# Patient Record
Sex: FEMALE | Race: WHITE | NOT HISPANIC OR LATINO | Employment: FULL TIME | URBAN - METROPOLITAN AREA
[De-identification: names, ages, dates, MRNs, and addresses within clinical notes are randomized per-mention and may not be internally consistent; named-entity substitution may affect disease eponyms.]

---

## 2017-01-10 ENCOUNTER — TELEPHONE (OUTPATIENT)
Dept: FAMILY MEDICINE | Facility: CLINIC | Age: 52
End: 2017-01-10

## 2017-01-10 DIAGNOSIS — I10 ESSENTIAL HYPERTENSION: Primary | ICD-10-CM

## 2017-01-10 DIAGNOSIS — E11.9 TYPE 2 DIABETES MELLITUS WITHOUT COMPLICATION, WITHOUT LONG-TERM CURRENT USE OF INSULIN: ICD-10-CM

## 2017-01-10 DIAGNOSIS — E78.5 HYPERLIPIDEMIA LDL GOAL <100: ICD-10-CM

## 2017-01-10 NOTE — TELEPHONE ENCOUNTER
----- Message from Chiquita Grijalva sent at 1/10/2017 11:39 AM CST -----  Contact: Self   Patient need a refill on all her medication and can she get a order for blood work for her future appointment. Please call thank you

## 2017-01-13 DIAGNOSIS — E11.9 TYPE 2 DIABETES MELLITUS WITHOUT COMPLICATION: ICD-10-CM

## 2017-01-30 ENCOUNTER — PATIENT MESSAGE (OUTPATIENT)
Dept: FAMILY MEDICINE | Facility: CLINIC | Age: 52
End: 2017-01-30

## 2017-01-30 DIAGNOSIS — E78.5 HYPERLIPIDEMIA LDL GOAL <100: ICD-10-CM

## 2017-01-30 DIAGNOSIS — I10 ESSENTIAL HYPERTENSION: ICD-10-CM

## 2017-01-30 DIAGNOSIS — K21.9 GASTROESOPHAGEAL REFLUX DISEASE, ESOPHAGITIS PRESENCE NOT SPECIFIED: ICD-10-CM

## 2017-01-30 RX ORDER — PANTOPRAZOLE SODIUM 40 MG/1
TABLET, DELAYED RELEASE ORAL
Qty: 30 TABLET | Refills: 0 | Status: SHIPPED | OUTPATIENT
Start: 2017-01-30 | End: 2017-02-17 | Stop reason: SDUPTHER

## 2017-01-30 RX ORDER — IRBESARTAN 150 MG/1
TABLET ORAL
Qty: 30 TABLET | Refills: 0 | Status: SHIPPED | OUTPATIENT
Start: 2017-01-30 | End: 2017-02-17 | Stop reason: SDUPTHER

## 2017-01-30 RX ORDER — PRAVASTATIN SODIUM 10 MG/1
TABLET ORAL
Qty: 30 TABLET | Refills: 0 | Status: SHIPPED | OUTPATIENT
Start: 2017-01-30 | End: 2017-03-03 | Stop reason: SDUPTHER

## 2017-01-30 RX ORDER — METOPROLOL TARTRATE 50 MG/1
TABLET ORAL
Qty: 90 TABLET | Refills: 0 | Status: SHIPPED | OUTPATIENT
Start: 2017-01-30 | End: 2017-03-03 | Stop reason: SDUPTHER

## 2017-02-07 ENCOUNTER — PATIENT MESSAGE (OUTPATIENT)
Dept: FAMILY MEDICINE | Facility: CLINIC | Age: 52
End: 2017-02-07

## 2017-02-07 DIAGNOSIS — I10 ESSENTIAL HYPERTENSION: ICD-10-CM

## 2017-02-08 RX ORDER — HYDROCHLOROTHIAZIDE 12.5 MG/1
12.5 CAPSULE ORAL DAILY
Qty: 30 CAPSULE | Refills: 0 | Status: SHIPPED | OUTPATIENT
Start: 2017-02-08 | End: 2017-03-03 | Stop reason: SDUPTHER

## 2017-02-17 DIAGNOSIS — I10 ESSENTIAL HYPERTENSION: ICD-10-CM

## 2017-02-17 DIAGNOSIS — K21.9 GASTROESOPHAGEAL REFLUX DISEASE, ESOPHAGITIS PRESENCE NOT SPECIFIED: ICD-10-CM

## 2017-02-17 RX ORDER — PANTOPRAZOLE SODIUM 40 MG/1
40 TABLET, DELAYED RELEASE ORAL DAILY
Qty: 30 TABLET | Refills: 0 | Status: SHIPPED | OUTPATIENT
Start: 2017-02-17 | End: 2017-03-10 | Stop reason: SDUPTHER

## 2017-02-17 RX ORDER — IRBESARTAN 150 MG/1
150 TABLET ORAL DAILY
Qty: 30 TABLET | Refills: 0 | Status: SHIPPED | OUTPATIENT
Start: 2017-02-17 | End: 2017-03-10 | Stop reason: SDUPTHER

## 2017-03-03 DIAGNOSIS — I10 ESSENTIAL HYPERTENSION: ICD-10-CM

## 2017-03-03 DIAGNOSIS — E78.5 HYPERLIPIDEMIA LDL GOAL <100: ICD-10-CM

## 2017-03-03 RX ORDER — PRAVASTATIN SODIUM 10 MG/1
TABLET ORAL
Qty: 30 TABLET | Refills: 0 | Status: SHIPPED | OUTPATIENT
Start: 2017-03-03 | End: 2017-03-10 | Stop reason: SDUPTHER

## 2017-03-03 RX ORDER — METOPROLOL TARTRATE 50 MG/1
TABLET ORAL
Qty: 90 TABLET | Refills: 0 | Status: SHIPPED | OUTPATIENT
Start: 2017-03-03 | End: 2017-03-10 | Stop reason: SDUPTHER

## 2017-03-03 RX ORDER — HYDROCHLOROTHIAZIDE 12.5 MG/1
CAPSULE ORAL
Qty: 30 CAPSULE | Refills: 0 | Status: SHIPPED | OUTPATIENT
Start: 2017-03-03 | End: 2017-03-10 | Stop reason: SDUPTHER

## 2017-03-06 ENCOUNTER — LAB VISIT (OUTPATIENT)
Dept: LAB | Facility: HOSPITAL | Age: 52
End: 2017-03-06
Attending: INTERNAL MEDICINE
Payer: COMMERCIAL

## 2017-03-06 DIAGNOSIS — E11.9 TYPE 2 DIABETES MELLITUS WITHOUT COMPLICATION, WITHOUT LONG-TERM CURRENT USE OF INSULIN: ICD-10-CM

## 2017-03-06 DIAGNOSIS — E78.5 HYPERLIPIDEMIA LDL GOAL <100: ICD-10-CM

## 2017-03-06 DIAGNOSIS — I10 ESSENTIAL HYPERTENSION: ICD-10-CM

## 2017-03-06 LAB
ALBUMIN SERPL BCP-MCNC: 3.1 G/DL
ALP SERPL-CCNC: 62 U/L
ALT SERPL W/O P-5'-P-CCNC: 10 U/L
ANION GAP SERPL CALC-SCNC: 12 MMOL/L
AST SERPL-CCNC: 13 U/L
BASOPHILS # BLD AUTO: 0.02 K/UL
BASOPHILS NFR BLD: 0.2 %
BILIRUB SERPL-MCNC: 0.3 MG/DL
BUN SERPL-MCNC: 14 MG/DL
CALCIUM SERPL-MCNC: 9.1 MG/DL
CHLORIDE SERPL-SCNC: 107 MMOL/L
CHOLEST/HDLC SERPL: 3.8 {RATIO}
CO2 SERPL-SCNC: 22 MMOL/L
CREAT SERPL-MCNC: 0.9 MG/DL
DIFFERENTIAL METHOD: ABNORMAL
EOSINOPHIL # BLD AUTO: 0.1 K/UL
EOSINOPHIL NFR BLD: 1.2 %
ERYTHROCYTE [DISTWIDTH] IN BLOOD BY AUTOMATED COUNT: 13.6 %
EST. GFR  (AFRICAN AMERICAN): >60 ML/MIN/1.73 M^2
EST. GFR  (NON AFRICAN AMERICAN): >60 ML/MIN/1.73 M^2
ESTIMATED AVG GLUCOSE: 154 MG/DL
GLUCOSE SERPL-MCNC: 122 MG/DL
HBA1C MFR BLD HPLC: 7 %
HCT VFR BLD AUTO: 38.9 %
HDL/CHOLESTEROL RATIO: 26.2 %
HDLC SERPL-MCNC: 141 MG/DL
HDLC SERPL-MCNC: 37 MG/DL
HGB BLD-MCNC: 12.4 G/DL
LDLC SERPL CALC-MCNC: 68.8 MG/DL
LYMPHOCYTES # BLD AUTO: 3.8 K/UL
LYMPHOCYTES NFR BLD: 40.5 %
MCH RBC QN AUTO: 29.7 PG
MCHC RBC AUTO-ENTMCNC: 31.9 %
MCV RBC AUTO: 93 FL
MONOCYTES # BLD AUTO: 0.6 K/UL
MONOCYTES NFR BLD: 6.4 %
NEUTROPHILS # BLD AUTO: 4.9 K/UL
NEUTROPHILS NFR BLD: 51.5 %
NONHDLC SERPL-MCNC: 104 MG/DL
PLATELET # BLD AUTO: 280 K/UL
PMV BLD AUTO: 10.4 FL
POTASSIUM SERPL-SCNC: 4.4 MMOL/L
PROT SERPL-MCNC: 6.8 G/DL
RBC # BLD AUTO: 4.18 M/UL
SODIUM SERPL-SCNC: 141 MMOL/L
TRIGL SERPL-MCNC: 176 MG/DL
WBC # BLD AUTO: 9.48 K/UL

## 2017-03-06 PROCEDURE — 83036 HEMOGLOBIN GLYCOSYLATED A1C: CPT

## 2017-03-06 PROCEDURE — 80053 COMPREHEN METABOLIC PANEL: CPT

## 2017-03-06 PROCEDURE — 36415 COLL VENOUS BLD VENIPUNCTURE: CPT | Mod: PO

## 2017-03-06 PROCEDURE — 85025 COMPLETE CBC W/AUTO DIFF WBC: CPT

## 2017-03-06 PROCEDURE — 80061 LIPID PANEL: CPT

## 2017-03-10 ENCOUNTER — PATIENT MESSAGE (OUTPATIENT)
Dept: ADMINISTRATIVE | Facility: OTHER | Age: 52
End: 2017-03-10

## 2017-03-10 ENCOUNTER — OFFICE VISIT (OUTPATIENT)
Dept: FAMILY MEDICINE | Facility: CLINIC | Age: 52
End: 2017-03-10
Payer: COMMERCIAL

## 2017-03-10 VITALS
HEIGHT: 61 IN | WEIGHT: 173.06 LBS | SYSTOLIC BLOOD PRESSURE: 146 MMHG | OXYGEN SATURATION: 96 % | TEMPERATURE: 99 F | BODY MASS INDEX: 32.67 KG/M2 | DIASTOLIC BLOOD PRESSURE: 96 MMHG | HEART RATE: 82 BPM

## 2017-03-10 DIAGNOSIS — I10 ESSENTIAL HYPERTENSION: ICD-10-CM

## 2017-03-10 DIAGNOSIS — Z00.00 ROUTINE MEDICAL EXAM: Primary | ICD-10-CM

## 2017-03-10 DIAGNOSIS — E78.5 HYPERLIPIDEMIA WITH TARGET LDL LESS THAN 100: ICD-10-CM

## 2017-03-10 DIAGNOSIS — F41.9 ANXIETY: ICD-10-CM

## 2017-03-10 DIAGNOSIS — E66.9 OBESITY (BMI 30-39.9): ICD-10-CM

## 2017-03-10 DIAGNOSIS — E11.9 DIABETES MELLITUS WITHOUT COMPLICATION: ICD-10-CM

## 2017-03-10 DIAGNOSIS — K21.9 GASTROESOPHAGEAL REFLUX DISEASE, ESOPHAGITIS PRESENCE NOT SPECIFIED: ICD-10-CM

## 2017-03-10 LAB
CREAT UR-MCNC: 84 MG/DL
MICROALBUMIN UR DL<=1MG/L-MCNC: 172 UG/ML
MICROALBUMIN/CREATININE RATIO: 204.8 UG/MG

## 2017-03-10 PROCEDURE — 82570 ASSAY OF URINE CREATININE: CPT

## 2017-03-10 PROCEDURE — 3077F SYST BP >= 140 MM HG: CPT | Mod: S$GLB,,, | Performed by: INTERNAL MEDICINE

## 2017-03-10 PROCEDURE — 99999 PR PBB SHADOW E&M-EST. PATIENT-LVL IV: CPT | Mod: PBBFAC,,, | Performed by: INTERNAL MEDICINE

## 2017-03-10 PROCEDURE — 99396 PREV VISIT EST AGE 40-64: CPT | Mod: S$GLB,,, | Performed by: INTERNAL MEDICINE

## 2017-03-10 PROCEDURE — 3080F DIAST BP >= 90 MM HG: CPT | Mod: S$GLB,,, | Performed by: INTERNAL MEDICINE

## 2017-03-10 RX ORDER — ALPRAZOLAM 0.25 MG/1
0.25 TABLET ORAL DAILY PRN
Qty: 30 TABLET | Refills: 2 | Status: SHIPPED | OUTPATIENT
Start: 2017-03-10 | End: 2017-12-13 | Stop reason: SDUPTHER

## 2017-03-10 RX ORDER — METFORMIN HYDROCHLORIDE 500 MG/1
500 TABLET, EXTENDED RELEASE ORAL
Qty: 90 TABLET | Refills: 1 | Status: SHIPPED | OUTPATIENT
Start: 2017-03-10 | End: 2017-09-13 | Stop reason: SDUPTHER

## 2017-03-10 RX ORDER — PRAVASTATIN SODIUM 10 MG/1
10 TABLET ORAL DAILY
Qty: 30 TABLET | Refills: 5 | Status: SHIPPED | OUTPATIENT
Start: 2017-03-10 | End: 2017-09-13 | Stop reason: SDUPTHER

## 2017-03-10 RX ORDER — PANTOPRAZOLE SODIUM 40 MG/1
40 TABLET, DELAYED RELEASE ORAL DAILY
Qty: 30 TABLET | Refills: 5 | Status: SHIPPED | OUTPATIENT
Start: 2017-03-10 | End: 2017-12-13 | Stop reason: SDUPTHER

## 2017-03-10 RX ORDER — HYDROCHLOROTHIAZIDE 12.5 MG/1
12.5 CAPSULE ORAL DAILY
Qty: 30 CAPSULE | Refills: 5 | Status: SHIPPED | OUTPATIENT
Start: 2017-03-10 | End: 2017-09-29 | Stop reason: SDUPTHER

## 2017-03-10 RX ORDER — METOPROLOL TARTRATE 50 MG/1
TABLET ORAL
Qty: 90 TABLET | Refills: 5 | Status: SHIPPED | OUTPATIENT
Start: 2017-03-10 | End: 2017-09-29 | Stop reason: SDUPTHER

## 2017-03-10 RX ORDER — IRBESARTAN 150 MG/1
150 TABLET ORAL DAILY
Qty: 30 TABLET | Refills: 5 | Status: SHIPPED | OUTPATIENT
Start: 2017-03-10 | End: 2017-09-18 | Stop reason: SDUPTHER

## 2017-03-10 NOTE — PROGRESS NOTES
HISTORY OF PRESENT ILLNESS:  Vijaya Montenegro is a 51 y.o. female who presents to the clinic today for a routine medical physical exam. Her last physical exam was approximately 1 years(s) ago.    Contraception: n/a      PAST MEDICAL HISTORY:  Past Medical History:   Diagnosis Date    Anxiety     GERD (gastroesophageal reflux disease)     Gout, arthritis     Hyperlipidemia LDL goal < 100     Hypertension     Obesity     Type II or unspecified type diabetes mellitus without mention of complication, not stated as uncontrolled     diet controlled       PAST SURGICAL HISTORY:  Past Surgical History:   Procedure Laterality Date     SECTION, LOW TRANSVERSE      x2       SOCIAL HISTORY:  Social History     Social History    Marital status:      Spouse name: N/A    Number of children: 2    Years of education: N/A     Occupational History    Not on file.     Social History Main Topics    Smoking status: Never Smoker    Smokeless tobacco: Never Used    Alcohol use Yes    Drug use: No    Sexual activity: Yes     Partners: Male     Other Topics Concern    Not on file     Social History Narrative    No narrative on file       FAMILY HISTORY:  Family History   Problem Relation Age of Onset    Hypertension Father     Bladder Cancer Father     Diabetes Sister     Heart attack Maternal Grandmother     COPD Mother     Diabetes Paternal Grandmother     Diabetes Paternal Aunt     Diabetes Paternal Aunt     Colon cancer Neg Hx        ALLERGIES AND MEDICATIONS: updated and reviewed.  Review of patient's allergies indicates:   Allergen Reactions    Codeine Other (See Comments)     Medication List with Changes/Refills   New Medications    METFORMIN (GLUCOPHAGE-XR) 500 MG 24 HR TABLET    Take 1 tablet (500 mg total) by mouth daily with breakfast.   Current Medications    PREVIFEM 0.25-35 MG-MCG PER TABLET    Take 1 tablet by mouth once daily.   Changed and/or Refilled Medications    Modified  Medication Previous Medication    ALPRAZOLAM (XANAX) 0.25 MG TABLET alprazolam (XANAX) 0.25 MG tablet       Take 1 tablet (0.25 mg total) by mouth daily as needed for Anxiety.    Take 1 tablet (0.25 mg total) by mouth daily as needed for Anxiety.    BLOOD SUGAR DIAGNOSTIC STRP blood sugar diagnostic Strp       Test one time daily.    Test one time daily.    HYDROCHLOROTHIAZIDE (MICROZIDE) 12.5 MG CAPSULE hydrochlorothiazide (MICROZIDE) 12.5 mg capsule       Take 1 capsule (12.5 mg total) by mouth once daily.    TAKE ONE CAPSULE BY MOUTH ONCE A DAY    IRBESARTAN (AVAPRO) 150 MG TABLET irbesartan (AVAPRO) 150 MG tablet       Take 1 tablet (150 mg total) by mouth once daily.    Take 1 tablet (150 mg total) by mouth once daily.    LANCETS 31 GAUGE MISC lancets 31 gauge Misc       1 lancet by Misc.(Non-Drug; Combo Route) route 2 (two) times daily. Test BID    1 lancet by Misc.(Non-Drug; Combo Route) route 2 (two) times daily. Test BID    METOPROLOL TARTRATE (LOPRESSOR) 50 MG TABLET metoprolol tartrate (LOPRESSOR) 50 MG tablet       TAKE 1 TABLET(S) BY MOUTH THREE TIMES DAILY    TAKE 1 TABLET(S) BY MOUTH THREE TIMES DAILY    PANTOPRAZOLE (PROTONIX) 40 MG TABLET pantoprazole (PROTONIX) 40 MG tablet       Take 1 tablet (40 mg total) by mouth once daily.    Take 1 tablet (40 mg total) by mouth once daily.    PRAVASTATIN (PRAVACHOL) 10 MG TABLET pravastatin (PRAVACHOL) 10 MG tablet       Take 1 tablet (10 mg total) by mouth once daily.    TAKE 1 TABLET(S) BY MOUTH ONCE A DAY             SCREENING HISTORY:  Health Maintenance       Date Due Completion Date    Influenza Vaccine 8/1/2016 12/16/2015 (Done)    Override on 12/16/2015: Done    Override on 10/17/2014: Done (at pharmacy)    Override on 10/1/2013: Done    Foot Exam 1/4/2017 1/4/2016    Urine Microalbumin 1/4/2017 1/4/2016    Eye Exam 8/15/2017 8/15/2016 (Done)    Override on 8/15/2016: Done    Override on 6/10/2014: Done (normal per patient)    Hemoglobin A1c 9/6/2017  3/6/2017    Override on 11/10/2014: Done ([facility] - 6.6)    Pap Smear 1/19/2018 1/19/2015 (Done)    Override on 1/19/2015: Done ([employer])    Override on 10/1/2012: Done    Override on 11/16/2006: Done    Lipid Panel 3/6/2018 3/6/2017    Override on 11/10/2014: Done (Total cholesterol 205, HDL 52, triglycerides 252, and )    Mammogram 2/27/2019 2/27/2017 (Done)    Override on 2/27/2017: Done (done at [facility])    Override on 2/1/2014: Done    Override on 10/3/2011: Done    Override on 12/5/2006: Done    TETANUS VACCINE 7/5/2023 7/5/2013    Colonoscopy 3/10/2027 3/10/2017 (Not Clinical)    Override on 3/10/2017: Not Clinically Appropriate (pt will do next month)    Pneumococcal PPSV23 (Medium Risk) (2) 11/3/2031 8/14/2013            REVIEW OF SYSTEMS:   The patient reports fair dietary habits.  The patient does not exercise regularly.  General ROS: negative for - chills, fever or malaise  Psychological ROS: negative for - memory difficulties, obsessive thoughts or suicidal ideation  Ophthalmic ROS: negative for - blurry vision or eye pain  ENT ROS: negative for - epistaxis, headaches, nasal congestion, oral lesions or sore throat  Allergy and Immunology ROS: negative for - hives  Hematological and Lymphatic ROS: negative for - swollen lymph nodes  Endocrine ROS: negative for - polydipsia/polyuria or temperature intolerance  Respiratory ROS: no cough, shortness of breath, or wheezing  Cardiovascular ROS: no chest pain or dyspnea on exertion  Gastrointestinal ROS: no abdominal pain, change in bowel habits, or black or bloody stools  Genito-Urinary ROS: no dysuria, trouble voiding, or hematuria  Breast ROS: negative for breast lumps  Musculoskeletal ROS: negative for - gait disturbance, joint swelling or muscle pain  Neurological ROS: no TIA or stroke symptoms  Dermatological ROS: negative for mole changes and rash    Physical Examination:   Vitals:    03/10/17 1256   BP: (!) 146/96   Pulse: 82  "  Temp: 98.9 °F (37.2 °C)     Weight: 78.5 kg (173 lb 1 oz)   Height: 5' 1" (154.9 cm)   Body mass index is 32.7 kg/(m^2).    General appearance - alert, well appearing, and in no distress and obese  Mental status - alert, oriented to person, place, and time, normal mood, behavior, speech, dress, motor activity, and thought processes  Eyes - pupils equal and reactive, extraocular eye movements intact, sclera anicteric  Mouth - mucous membranes moist, pharynx normal without lesions  Neck - supple, no significant adenopathy, carotids upstroke normal bilaterally, no bruits, thyroid exam: thyroid is normal in size without nodules or tenderness  Lymphatics - no palpable lymphadenopathy  Chest - clear to auscultation, no wheezes, rales or rhonchi, symmetric air entry  Heart - normal rate and regular rhythm, no gallops noted  Abdomen - soft, nontender, nondistended, no masses or organomegaly  Breasts - not examined  Back exam - full range of motion, no tenderness, palpable spasm or pain on motion  Neurological - alert, oriented, normal speech, no focal findings or movement disorder noted, cranial nerves II through XII intact  Musculoskeletal - no joint tenderness, deformity or swelling, no muscular tenderness noted  Extremities - peripheral pulses normal, no pedal edema, no clubbing or cyanosis  Skin - normal coloration and turgor, no rashes, no suspicious skin lesions noted      Protective Sensation (w/ 10 gram monofilament):  Right: Intact  Left: Intact    Visual Inspection:  Normal -  Bilateral    Pedal Pulses:   Right: Present  Left: Present    Posterior tibialis:   Right:Present  Left: Present       Results for orders placed or performed in visit on 03/06/17   CBC auto differential   Result Value Ref Range    WBC 9.48 3.90 - 12.70 K/uL    RBC 4.18 4.00 - 5.40 M/uL    Hemoglobin 12.4 12.0 - 16.0 g/dL    Hematocrit 38.9 37.0 - 48.5 %    MCV 93 82 - 98 fL    MCH 29.7 27.0 - 31.0 pg    MCHC 31.9 (L) 32.0 - 36.0 %    RDW " 13.6 11.5 - 14.5 %    Platelets 280 150 - 350 K/uL    MPV 10.4 9.2 - 12.9 fL    Gran # 4.9 1.8 - 7.7 K/uL    Lymph # 3.8 1.0 - 4.8 K/uL    Mono # 0.6 0.3 - 1.0 K/uL    Eos # 0.1 0.0 - 0.5 K/uL    Baso # 0.02 0.00 - 0.20 K/uL    Gran% 51.5 38.0 - 73.0 %    Lymph% 40.5 18.0 - 48.0 %    Mono% 6.4 4.0 - 15.0 %    Eosinophil% 1.2 0.0 - 8.0 %    Basophil% 0.2 0.0 - 1.9 %    Differential Method Automated    Comprehensive metabolic panel   Result Value Ref Range    Sodium 141 136 - 145 mmol/L    Potassium 4.4 3.5 - 5.1 mmol/L    Chloride 107 95 - 110 mmol/L    CO2 22 (L) 23 - 29 mmol/L    Glucose 122 (H) 70 - 110 mg/dL    BUN, Bld 14 6 - 20 mg/dL    Creatinine 0.9 0.5 - 1.4 mg/dL    Calcium 9.1 8.7 - 10.5 mg/dL    Total Protein 6.8 6.0 - 8.4 g/dL    Albumin 3.1 (L) 3.5 - 5.2 g/dL    Total Bilirubin 0.3 0.1 - 1.0 mg/dL    Alkaline Phosphatase 62 55 - 135 U/L    AST 13 10 - 40 U/L    ALT 10 10 - 44 U/L    Anion Gap 12 8 - 16 mmol/L    eGFR if African American >60.0 >60 mL/min/1.73 m^2    eGFR if non African American >60.0 >60 mL/min/1.73 m^2   Lipid panel   Result Value Ref Range    Cholesterol 141 120 - 199 mg/dL    Triglycerides 176 (H) 30 - 150 mg/dL    HDL 37 (L) 40 - 75 mg/dL    LDL Cholesterol 68.8 63.0 - 159.0 mg/dL    HDL/Chol Ratio 26.2 20.0 - 50.0 %    Total Cholesterol/HDL Ratio 3.8 2.0 - 5.0    Non-HDL Cholesterol 104 mg/dL   Hemoglobin A1c   Result Value Ref Range    Hemoglobin A1C 7.0 (H) 4.5 - 6.2 %    Estimated Avg Glucose 154 (H) 68 - 131 mg/dL          ASSESSMENT AND PLAN:  1. Routine medical exam  Counseled on age appropriate medical preventative services including age appropriate cancer screenings, age appropriate eye and dental exams, over all nutritional health, need for a consistent exercise regimen, and an over all push towards maintaining a vigorous and active lifestyle.  Counseled on age appropriate vaccines and discussed upcoming health care needs based on age/gender. Discussed good sleep hygiene  and stress management.     2. Diabetes mellitus without complication  Diabetes control has worsened.  I will start her on metformin.  We discussed diabetic diet and regular exercise.  She was given some information.  We will also refer her to a nutritionist.  Recheck blood work in 6 months.  - Microalbumin/creatinine urine ratio  - lancets 31 gauge Misc; 1 lancet by Misc.(Non-Drug; Combo Route) route 2 (two) times daily. Test BID  Dispense: 100 each; Refill: 5  - blood sugar diagnostic Strp; Test one time daily.  Dispense: 100 strip; Refill: 5  - metformin (GLUCOPHAGE-XR) 500 MG 24 hr tablet; Take 1 tablet (500 mg total) by mouth daily with breakfast.  Dispense: 90 tablet; Refill: 1  - Ambulatory Referral to Diabetes Education    3. Essential hypertension  Discussed sodium restriction, maintaining ideal body weight and regular exercise program as physiologic means to achieve blood pressure control. The patient will strive towards this. The current medical regimen is effective;  continue present plan and medications. Recommended patient to check home readings to monitor and see me for followup as scheduled or sooner as needed. Patient was educated that both decongestant and anti-inflammatory medication may raise blood pressure.   - metoprolol tartrate (LOPRESSOR) 50 MG tablet; TAKE 1 TABLET(S) BY MOUTH THREE TIMES DAILY  Dispense: 90 tablet; Refill: 5  - hydrochlorothiazide (MICROZIDE) 12.5 mg capsule; Take 1 capsule (12.5 mg total) by mouth once daily.  Dispense: 30 capsule; Refill: 5  - irbesartan (AVAPRO) 150 MG tablet; Take 1 tablet (150 mg total) by mouth once daily.  Dispense: 30 tablet; Refill: 5  - Hypertension Digital Medicine (HDMP)  Enrollment Order  - Assign [facility] Onboarding Questionnaire Series    4. Hyperlipidemia with target LDL less than 100  We discussed low fat diet and regular exercise.The current medical regimen is effective;  continue present plan and medications.    - pravastatin (PRAVACHOL) 10  MG tablet; Take 1 tablet (10 mg total) by mouth once daily.  Dispense: 30 tablet; Refill: 5    5. Gastroesophageal reflux disease, esophagitis presence not specified  Symptoms controlled. Reflux precautions discussed (eliminate tobacco if a smoker; minimize caffeine, chocolate and red/white peppermint intake; avoid heavy and spicy meals; don't lay down within 2-3 hours after eating). Medication as needed. Patient asked to take medication breaks, if possible - discussed chronic use can limit calcium absorption, increases the risk for intestinal infections, and can cause kidney damage.    - pantoprazole (PROTONIX) 40 MG tablet; Take 1 tablet (40 mg total) by mouth once daily.  Dispense: 30 tablet; Refill: 5    6. Anxiety  Stable. Medication as needed. Patient was counseled that a recent study showed that the chronic use of anxiolytic medication may increase the risk for developing dementia.   - alprazolam (XANAX) 0.25 MG tablet; Take 1 tablet (0.25 mg total) by mouth daily as needed for Anxiety.  Dispense: 30 tablet; Refill: 2    7. Obesity (BMI 30-39.9)  The patient is asked to make an attempt to improve diet and exercise patterns to aid in medical management of this problem.           Return in about 6 months (around 9/10/2017), or if symptoms worsen or fail to improve, for follow up chronic medical conditions.. or sooner as needed.

## 2017-03-10 NOTE — MR AVS SNAPSHOT
[facility]  [address] [facility]  [place] [address]  Phone: [phone]  Fax: [phone]                  Vijaya Montenegro   3/10/2017 1:00 PM   Office Visit    Description:  Female : 1965   Provider:  Lanette Hoover MD   Department:  [facility]           Reason for Visit     Medication Refill           Diagnoses this Visit        Comments    Routine medical exam    -  Primary     Diabetes mellitus without complication         Essential hypertension         Hyperlipidemia with target LDL less than 100         Gastroesophageal reflux disease, esophagitis presence not specified         Anxiety         Obesity (BMI 30-39.9)                To Do List           Goals (5 Years of Data)              3/6/17    8/8/16    12/29/15    HEMOGLOBIN A1C < 7.0   7.0  6.4  6.5    Related Problems    Diabetes mellitus without complication      Follow-Up and Disposition     Return in about 6 months (around 9/10/2017), or if symptoms worsen or fail to improve, for follow up chronic medical conditions..       These Medications        Disp Refills Start End    alprazolam (XANAX) 0.25 MG tablet 30 tablet 2 3/10/2017     Take 1 tablet (0.25 mg total) by mouth daily as needed for Anxiety. - Oral    Pharmacy: [place] [id number] Ph #: [phone]       metoprolol tartrate (LOPRESSOR) 50 MG tablet 90 tablet 5 3/10/2017     TAKE 1 TABLET(S) BY MOUTH THREE TIMES DAILY    Pharmacy: [place] [id number] Ph #: [phone]       hydrochlorothiazide (MICROZIDE) 12.5 mg capsule 30 capsule 5 3/10/2017     Take 1 capsule (12.5 mg total) by mouth once daily. - Oral    Pharmacy: [place] [id number] Ph #: [phone]       irbesartan (AVAPRO) 150 MG tablet 30 tablet 5 3/10/2017     Take 1 tablet (150 mg total) by mouth once daily. - Oral    [place] [facility]  [place] [address] [place] #: [phone]       pantoprazole (PROTONIX) 40 MG tablet 30 tablet 5 3/10/2017     Take 1 tablet (40 mg total) by mouth once daily. - Oral    [place] [facility]  [place] [address] Ph #: [phone]       pravastatin (PRAVACHOL) 10 MG tablet 30 tablet 5 3/10/2017     Take 1 tablet (10 mg total) by mouth once daily. - Oral    Pharm[place] [facility] [place] [address] Ph #: [phone]       lancets 31 gauge Misc 100 each 5 3/10/2017     1 lancet by Misc.(Non-Drug; Combo Route) route 2 (two) times daily. Test BID - Misc.(Non-Drug; Combo Route)    Pharmacy: [facility] [place] [facility] [place] [address] Ph #: [phone]       blood sugar diagnostic Strp 100 strip 5 3/10/2017     Test one time daily.    Pharm[place] [facility] [place] [address] Ph #: [phone]       metformin (GLUCOPHAGE-XR) 500 MG 24 hr tablet 90 tablet 1 3/10/2017 3/10/2018    Take 1 tablet (500 mg total) by mouth daily with breakfast. - Oral    [place] [facility]  [place] [address] Ph #: [phone]         [facility] On Call     [facility] On Call Nurse Care Line - 24/7 Assistance  Registered nurses in the [facility] On Call Center provide clinical advisement, health education, appointment booking, and other advisory services.  Call for this free service at [phone].             Medications           Message regarding Medications     Verify the changes and/or additions to your medication regime listed below are the same as discussed with your clinician today.  If any of these changes or additions are incorrect, please notify your healthcare provider.        START taking these NEW medications        Refills    metformin (GLUCOPHAGE-XR) 500 MG 24 hr tablet 1    Sig: Take 1 tablet (500 mg total) by mouth daily with breakfast.    Class:  Normal    Route: Oral      CHANGE how you are taking these medications     Start Taking Instead of    hydrochlorothiazide (MICROZIDE) 12.5 mg capsule hydrochlorothiazide (MICROZIDE) 12.5 mg capsule    Dosage:  Take 1 capsule (12.5 mg total) by mouth once daily. Dosage:  TAKE ONE CAPSULE BY MOUTH ONCE A DAY    Reason for Change:  Reorder     pravastatin (PRAVACHOL) 10 MG tablet pravastatin (PRAVACHOL) 10 MG tablet    Dosage:  Take 1 tablet (10 mg total) by mouth once daily. Dosage:  TAKE 1 TABLET(S) BY MOUTH ONCE A DAY    Reason for Change:  Reorder            Verify that the below list of medications is an accurate representation of the medications you are currently taking.  If none reported, the list may be blank. If incorrect, please contact your healthcare provider. Carry this list with you in case of emergency.           Current Medications     alprazolam (XANAX) 0.25 MG tablet Take 1 tablet (0.25 mg total) by mouth daily as needed for Anxiety.    blood sugar diagnostic Strp Test one time daily.    hydrochlorothiazide (MICROZIDE) 12.5 mg capsule Take 1 capsule (12.5 mg total) by mouth once daily.    irbesartan (AVAPRO) 150 MG tablet Take 1 tablet (150 mg total) by mouth once daily.    lancets 31 gauge Misc 1 lancet by Misc.(Non-Drug; Combo Route) route 2 (two) times daily. Test BID    metoprolol tartrate (LOPRESSOR) 50 MG tablet TAKE 1 TABLET(S) BY MOUTH THREE TIMES DAILY    pantoprazole (PROTONIX) 40 MG tablet Take 1 tablet (40 mg total) by mouth once daily.    pravastatin (PRAVACHOL) 10 MG tablet Take 1 tablet (10 mg total) by mouth once daily.    PREVIFEM 0.25-35 mg-mcg per tablet Take 1 tablet by mouth once daily.    metformin (GLUCOPHAGE-XR) 500 MG 24 hr tablet Take 1 tablet (500 mg total) by mouth daily with breakfast.           Clinical Reference Information           Your Vitals Were     BP Pulse Temp Height Weight Last Period    146/96 (BP Location: Right arm, Patient Position: Sitting, BP Method:  "Manual) 82 98.9 °F (37.2 °C) (Oral) 5' 1" (1.549 m) 78.5 kg (173 lb 1 oz) 03/09/2017    SpO2 BMI             96% 32.7 kg/m2         Blood Pressure          Most Recent Value    BP  (!)  146/96      Allergies as of 3/10/2017     Codeine      Immunizations Administered on Date of Encounter - 3/10/2017     None      Orders Placed During Today's Visit      Normal Orders This Visit    Ambulatory Referral to Diabetes Education     Assign HDMP Onboarding Questionnaire Series     Hypertension Digital Medicine (HDMP)  Enrollment Order     Microalbumin/creatinine urine ratio       Instructions      Diet: Diabetes  Food is an important tool that you can use to control diabetes and stay healthy. Eating well-balanced meals in the correct amounts will help you control your blood glucose levels and prevent low blood sugar reactions. It will also help you reduce the health risks of diabetes. There is no one specific diet that is right for everyone with diabetes. But there are general guidelines to follow. A registered dietitian (RD) will create a tailored diet approach thats just right for you. He or she will also help you plan healthy meals and snacks. If you have any questions, call your dietitian for advice.    Guidelines for success  Talk with your healthcare provider before starting a diabetes diet or weight loss program. If you haven't talked with a dietitian yet, ask your provider for a referral. The following guidelines can help you succeed:  · Select foods from the 6 food groups below. Your dietitian will help you find food choices within each group. He or she will also show you serving sizes and how many servings you can have at each meal.  ¨ Grains, beans, and starchy vegetables  ¨ Vegetables  ¨ Fruit  ¨ Milk or yogurt  ¨ Meat, poultry, fish, or tofu  ¨ Healthy fats  · Check your blood sugar levels as directed by your provider. Take any medicine as prescribed by your provider.  · Learn to read food labels and pick the " right portion sizes.  · Eat only the amount of food in your meal plan. Eat about the same amount of food at regular times each day. Dont skip meals. Eat meals 4 to 5 hours apart, with snacks in between.  · Limit alcohol. It raises blood sugar levels. Drink water or calorie-free diet drinks that use safe sweeteners.  · Eat less fat to help lower your risk of heart disease. Use nonfat or low-fat dairy products and lean meats. Avoid fried foods. Use cooking oils that are unsaturated, such as olive, canola, or peanut oil.  · Talk with your dietitian about safe sugar substitutes.  · Avoid added salt. It can contribute to high blood pressure, which can cause heart disease. People with diabetes already have a risk of high blood pressure and heart disease.  · Stay at a healthy weight. If you need to lose weight, cut down on your portion sizes. But dont skip meals. Exercise is an important part of any weight management program. Talk with your provider about an exercise program thats right for you.  · For more information about the best diet plan for you, talk with a registered dietitian (RD). To find an RD in your area, contact:  ¨ Academy of Nutrition and Dietetics www.eatright.org  ¨ The American Diabetes Association [phone] www.diabetes.org  Date Last Reviewed: 8/1/2016 © 2000-2016 [employer]. [address] [place] [address]. All rights reserved. This information is not intended as a substitute for professional medical care. Always follow your healthcare professional's instructions.        Diabetes: Learning About Serving and Portion Sizes     A good rule of thumb: Devote half your plate to vegetables and green salad. Split the other half between protein and starchy carbohydrates. Fruit makes a good dessert.     Servings and portions. Whats the difference? These terms can be very confusing. But learning to measure serving sizes can help you figure out how many carbohydrates (carbs) and  other foods you eat each day. They are also powerful tools for managing your weight.  Servings and portions  Many different words are used to describe amounts of food. If your health care provider uses a term youre not sure of, dont be afraid to ask. It helps to know the difference between servings and portions:  · A serving size is a fixed size. Food producers use this term to describe their products. For example, the label on a cereal box could say that 1 cup of dry cereal = 1 serving.  · A portion (also called a helping) is how much you eat or how much you put on your plate at a meal. For example, you might eat 2 cups of cereal at breakfast.  Using serving information  The portion you choose to eat (such as 2 cups of cereal) may be more than one serving as listed on the food label (such as 1 cup of cereal). Thats why it helps to measure or weigh the food you eat. Because the food label values are based on servings, youll need to know how many servings you eat at one sitting.     Ounces: 2 to 3 ounces is about the size of your palm.       1 Cup: 1 cup (or a medium-sized piece) is about the size of your fist.       1/2 Cup: 1/2 cup is about the size of your cupped hand.      One tablespoon is about the size of your thumb.  One teaspoon is about the size of the tip of your thumb.  Keeping track of serving sizes  When youre planning for a snack or a meal, keep servings in mind. If you dont have measuring cups or a scale handy, there are ways to eyeball serving sizes, such as comparing your food to the size of your hand (see pictures above).  Managing portion sizes  If your weight is a concern, reducing your portions can help. You can eat more than one serving of a food at once. But to keep from eating too much at one meal, learn how to manage your portions. A portion is the amount of each type of food on your plate. See the plate diagram for an example of balanced portions.  Date Last Reviewed: 3/1/2016  ©  9903-9543 [employer]. [address] [place] [address], [place] [address]. All rights reserved. This information is not intended as a substitute for professional medical care. Always follow your healthcare professional's instructions.        Understanding Carbohydrates, Fats, and Protein  Food is a source of fuel and nourishment for your body. Its also a source of pleasure. Having diabetes doesnt mean you have to eat special foods or give up desserts. Instead, your dietitian can show you how to plan meals to suit your body. To start, learn how different foods affect blood sugar.  Carbohydrates  Carbohydrates are the main source of fuel for the body. Carbohydrates raise blood sugar. Many people think carbohydrates are only found in pasta or bread. But carbohydrates are actually in many kinds of foods:  · Sugars occur naturally in foods such as fruit, milk, honey, and molasses. Sugars can also be added to many foods, from cereals and yogurt to candy and desserts. Sugars raise blood sugar.  · Starches are found in bread, cereals, pasta, and dried beans. Theyre also found in corn, peas, potatoes, yam, acorn squash, and butternut squash. Starches also raise blood sugar.   · Fiber is found in foods such as vegetables, fruits, beans, and whole grains. Unlike other carbs, fiber isnt digested or absorbed. So it doesnt raise blood sugar. In fact, fiber can help keep blood sugar from rising too fast. It also helps keep blood cholesterol at a healthy level.  Did you know?  Even though carbohydrates raise blood sugar, its best to have some in every meal. They are an important part of a healthy diet.   Fat  Fat is an energy source that can be stored until needed. Fat does not raise blood sugar. However, it can raise blood cholesterol, increasing the risk of heart disease. Fat is also high in calories, which can cause weight gain. Not all types of fat are the same.  More Healthy:  · Monounsaturated fats are mostly found  in vegetable oils, such as olive, canola, and peanut oils. They are also found in avocados and some nuts. Monounsaturated fats are healthy for your heart. Thats because they lower LDL (unhealthy) cholesterol.  · Polyunsaturated fats are mostly found in vegetable oils, such as corn, safflower, and soybean oils. They are also found in some seeds, nuts, and fish. Polyunsaturated fats lower LDL (unhealthy) cholesterol. So, choosing them instead of saturated fats is healthy for your heart. Certain unsaturated fats can help lower triglycerides.   Less Healthy:  · Saturated fats are found in animal products, such as meat, poultry, whole milk, lard, and butter. Saturated fats raise LDL cholesterol and are not healthy for your heart.  · Hydrogenated oils and trans fats are formed when vegetable oils are processed into solid fats. They are found in many processed foods. Hydrogenated oils and trans fats raise LDL cholesterol and lower HDL (healthy) cholesterol. They are not healthy for your heart.  Protein  Protein helps the body build and repair muscle and other tissue. Protein has little or no effect on blood sugar. However, many foods that contain protein also contain saturated fat. By choosing low-fat protein sources, you can get the benefits of protein without the extra fat:  · Plant protein is found in dry beans and peas, nuts, and soy products, such as tofu and soymilk. These sources tend to be cholesterol-free and low in saturated fat.  · Animal protein is found in fish, poultry, meat, cheese, milk, and eggs. These contain cholesterol and can be high in saturated fat. Aim for lean, lower-fat choices.  Date Last Reviewed: 3/1/2016  © 4293-9528 [employer]. [address] [place] [address]. All rights reserved. This information is not intended as a substitute for professional medical care. Always follow your healthcare professional's instructions.        Healthy Meals for Diabetes     A healthcare  provider will help you develop a meal plan that fits your needs.   Ask your healthcare team to help you make a meal plan that fits your needs. Your meal plan tells you when to eat your meals and snacks, what kinds of foods to eat, and how much of each food to eat. You dont have to give up all the foods you like. But you do need to follow some guidelines.  Choose healthy carbohydrates  Starches, sugars, and fiber are all types of carbohydrates. Fiber can help lower your cholesterol and triglycerides. Fiber is also healthy for your heart. You should have 20 to 35 grams of total fiber each day. Fiber-rich foods include:  · Whole-grain breads and cereals  · Bulgur wheat  · Brown rice     · Whole-wheat pasta  · Fruits and vegetables  · Dry beans, and peas   Keep track of the amount of carbohydrates you eat. This can help you keep the right balance of physical activity and medicine. The amount of carbohydrates needed will vary for each person. It depends on many things such as your health, the medicines you take, and how active you are. Your healthcare team will help you figure out the right amount of carbohydrates for you. You may start with around 45 to 60 grams of carbohydrates per meal, depending on your situation.   Here are some examples of foods containing about 15 grams of carbohydrates (1 serving of carbohydrates):  · 1/2 cup of canned or frozen fruit  · A small piece of fresh fruit (4 ounces)  · 1 slice of bread  · 1/2 cup of oatmeal  · 1/3 cup of rice  · 4 to 6 crackers  · 1/2 English muffin  · 1/2 cup of black beans  · 1/4 of a large baked potato (3 ounces)  · 2/3 cup of plain fat-free yogurt  · 1 cup of soup  · 1/2 cup of casserole  · 6 chicken nuggets  · 2-inch-square brownie or cake without frosting  · 2 small cookies  · 1/2 cup of ice cream or sherbet  Choose healthy protein foods  Eating protein that is low in fat can help you control your weight. It also helps keep your heart healthy. Low-fat protein  foods include:  · Fish  · Plant proteins, such as dry beans and peas, nuts, and soy products like tofu and soymilk  · Lean meat with all visible fat removed  · Poultry with the skin removed  · Low-fat or nonfat milk, cheese, and yogurt  Limit unhealthy fats and sugar  Saturated and trans fats are unhealthy for your heart. They raise LDL (bad) cholesterol. Fat is also high in calories, so it can make you gain weight. To cut down on unhealthy fats and sugar, limit these foods:  · Butter or margarine  · Palm and palm kernel oils and coconut oil  · Cream  · Cheese  · [place]edo  · Lunch meats     · Ice cream  · Sweet bakery goods such as pies, muffins, and donuts  · Jams and jellies  · Candy bars  · Regular sodas   How much to eat  The amount of food you eat affects your blood sugar. It also affects your weight. Your healthcare team will tell you how much of each type of food you should eat.  · Use measuring cups and spoons and a food scale to measure serving sizes.  · Learn what a correct serving size looks like on your plate. This will help when you are away from home and cant measure your servings.  · Eat only the number of servings given on your meal plan for each food. Dont take seconds.  · Learn to read food labels. Be sure to look at serving size, total carbohydrates, fiber, calories, sugar, and saturated and trans fats. Look for healthier alternatives to foods that have added sugar.  · Plan ahead for parties so you can still have a good time without going overboard with unhealthy food choices. Set a good example yourself by bringing a healthy dish to pot lucks.   Choose healthy snacks  When it comes to snacks, we usually think about foods with added sugar and fats. But there are many other options for healthier snack choices. Here are a few snack ideas to choose from:  Snacks with less than 5 grams of carbohydrates  · 1 piece of string cheese  · 3 celery sticks plus 1 tablespoon of peanut butter  · 5 cherry  tomatoes plus 1 tablespoon of ranch dressing  · 1 hard-boiled egg  · 1/4 cup of fresh blueberries  ·  5 baby carrots  · 1 cup of light popcorn  · 1/2 cup of sugar-free gelatin  · 15 almonds  Snacks with about 10 to 20 grams of carbohydrates  · 1/3 cup of hummus plus 1 cup of fresh cut nonstarchy vegetables (carrots, green peppers, broccoli, celery, or a combination)  · 1/2 cup of fresh or canned fruit plus 1/4 cup of cottage cheese  · 1/2 cup of tuna salad with 4 crackers  · 2 rice cakes and a tablespoon of peanut butter  · 1 small apple or orange  · 3 cups light popcorn  · 1/2 of a turkey sandwich (1 slice of whole-wheat bread, 2 ounces of turkey, and mustard)  Portion sizes are important to controlling your blood sugar and staying at a healthy weight. Stock up on healthy snack items so you always have them on hand.  When to eat  Your meal plan will likely include breakfast, lunch, dinner, and some snacks.  · Try to eat your meals and snacks at about the same times each day.  · Eat all your meals and snacks. Skipping a meal or snack can make your blood sugar drop too low. It can also cause you to eat too much at the next meal or snack. Then your blood sugar could get too high.  Date Last Reviewed: 7/1/2016  © 8299-9927 [employer]. [address] [place] [address], [place] [address]. All rights reserved. This information is not intended as a substitute for professional medical care. Always follow your healthcare professional's instructions.             Hypertension Digital Medicine Program Information              As discussed, you could benefit from enrolling in the Hypertension Digital Medicine Program. The goal of the program is to help you effectively manage your high blood pressure through an appropriate balance of medication and lifestyle changes, all from the comfort of your own home. Effectively managed blood pressure reduces your risk of having a heart attack or a stroke in the future, so you can enjoy  "a long and healthy life. We want to make blood pressure control your goal.        What is the Hypertension Digital Medicine Program?  Finding the right balance in managing high blood pressure is different for every person, and we will tailor our treatment approach based on your individual needs using the most current evidence-based guidelines.  As a participant, you will be able to send home blood pressure readings, on your schedule, directly into your medical record at [facility]. I, along with a team of pharmacists, will monitor this data, help you adjust your medication(s) and/or make lifestyle recommendations to better manage your hypertension.       What are the requirements of the program?   Participating in the program is as easy as 1 - 2 - 3.   1. Smartphone - You must have your own smartphone to participate (either an iPhone or an Android phone such as D2S, [employer] HTC, LG, Synchronized, or [employer]).    2. [facility] account - [facility] offers a great way to connect through the online patient portal, [facility], which is free and provides you access to your [facility] medical record.  3. Digital blood pressure cuff - Using this blood pressure cuff that hooks up to your smartphone, you will be able to send in your home blood pressure readings to the Hypertension Digital Medicine Team. We have made arrangements to offer blood pressure cuffs at a discount for our programs participants.           What can I do to get started?   Complete the Hypertension Digital Medicine Patient Consent questionnaire already available in your [facility] account. To access and complete this questionnaire, either  - Use the [facility] website on a computer and select My Medical Record, then Questionnaires.  - Use the [employer] shaquille on your smartphone and select "Questionnaires".    Once you have given consent, additional onboarding questionnaires will be assigned to you to complete prior to starting the program. You must return to the " ""Questionnaires" page of your [facility] account to start the additional questionnaires.     How do I purchase a digital blood pressure cuff and obtain a discount?  [facility] has negotiated a discounted price from industry leading healthcare technology companies. Patients using an Apple iPhone can purchase an [employer]alth Ease Blood Pressure cuff for $33 (originally $39.99). While supplies last, Android users can purchase the [employer] Blood Pressure Monitor for $45 (originally $129.95).  Purchase locations will be sent once all onboarding questionnaires have been completed (see above).    If you have any questions regarding this program or would like more information, please visit our Hypertension Digital Medicine website ([email or web address]) or call Digital Medicine Patient Support at [phone].          Language Assistance Services     ATTENTION: Language assistance services are available, free of charge. Please call [phone].      ATENCIÓN: Si habla español, tiene a ac disposición servicios gratuitos de asistencia lingüística. Llame al [phone].     CHÚ Ý: N?u b?n nói Ti?ng Vi?t, có các d?ch v? h? tr? ngôn ng? mi?n phí dành cho b?n. G?i s? [phone].         [facility] complies with applicable Federal civil rights laws and does not discriminate on the basis of race, color, national origin, age, disability, or sex.        "

## 2017-03-10 NOTE — PATIENT INSTRUCTIONS
Diet: Diabetes  Food is an important tool that you can use to control diabetes and stay healthy. Eating well-balanced meals in the correct amounts will help you control your blood glucose levels and prevent low blood sugar reactions. It will also help you reduce the health risks of diabetes. There is no one specific diet that is right for everyone with diabetes. But there are general guidelines to follow. A registered dietitian (RD) will create a tailored diet approach thats just right for you. He or she will also help you plan healthy meals and snacks. If you have any questions, call your dietitian for advice.    Guidelines for success  Talk with your healthcare provider before starting a diabetes diet or weight loss program. If you haven't talked with a dietitian yet, ask your provider for a referral. The following guidelines can help you succeed:  · Select foods from the 6 food groups below. Your dietitian will help you find food choices within each group. He or she will also show you serving sizes and how many servings you can have at each meal.  ¨ Grains, beans, and starchy vegetables  ¨ Vegetables  ¨ Fruit  ¨ Milk or yogurt  ¨ Meat, poultry, fish, or tofu  ¨ Healthy fats  · Check your blood sugar levels as directed by your provider. Take any medicine as prescribed by your provider.  · Learn to read food labels and pick the right portion sizes.  · Eat only the amount of food in your meal plan. Eat about the same amount of food at regular times each day. Dont skip meals. Eat meals 4 to 5 hours apart, with snacks in between.  · Limit alcohol. It raises blood sugar levels. Drink water or calorie-free diet drinks that use safe sweeteners.  · Eat less fat to help lower your risk of heart disease. Use nonfat or low-fat dairy products and lean meats. Avoid fried foods. Use cooking oils that are unsaturated, such as olive, canola, or peanut oil.  · Talk with your dietitian about safe sugar substitutes.  · Avoid  added salt. It can contribute to high blood pressure, which can cause heart disease. People with diabetes already have a risk of high blood pressure and heart disease.  · Stay at a healthy weight. If you need to lose weight, cut down on your portion sizes. But dont skip meals. Exercise is an important part of any weight management program. Talk with your provider about an exercise program thats right for you.  · For more information about the best diet plan for you, talk with a registered dietitian (RD). To find an RD in your area, contact:  ¨ Academy of Nutrition and Dietetics www.eatright.org  ¨ The American Diabetes Association [phone] www.diabetes.org  Date Last Reviewed: 8/1/2016 © 2000-2016 [employer]. [address] [place] [address], [place] [address]. All rights reserved. This information is not intended as a substitute for professional medical care. Always follow your healthcare professional's instructions.        Diabetes: Learning About Serving and Portion Sizes     A good rule of thumb: Devote half your plate to vegetables and green salad. Split the other half between protein and starchy carbohydrates. Fruit makes a good dessert.     Servings and portions. Whats the difference? These terms can be very confusing. But learning to measure serving sizes can help you figure out how many carbohydrates (carbs) and other foods you eat each day. They are also powerful tools for managing your weight.  Servings and portions  Many different words are used to describe amounts of food. If your health care provider uses a term youre not sure of, dont be afraid to ask. It helps to know the difference between servings and portions:  · A serving size is a fixed size. Food producers use this term to describe their products. For example, the label on a cereal box could say that 1 cup of dry cereal = 1 serving.  · A portion (also called a helping) is how much you eat or how much you put on your plate at  a meal. For example, you might eat 2 cups of cereal at breakfast.  Using serving information  The portion you choose to eat (such as 2 cups of cereal) may be more than one serving as listed on the food label (such as 1 cup of cereal). Thats why it helps to measure or weigh the food you eat. Because the food label values are based on servings, youll need to know how many servings you eat at one sitting.     Ounces: 2 to 3 ounces is about the size of your palm.       1 Cup: 1 cup (or a medium-sized piece) is about the size of your fist.       1/2 Cup: 1/2 cup is about the size of your cupped hand.      One tablespoon is about the size of your thumb.  One teaspoon is about the size of the tip of your thumb.  Keeping track of serving sizes  When youre planning for a snack or a meal, keep servings in mind. If you dont have measuring cups or a scale handy, there are ways to eyeball serving sizes, such as comparing your food to the size of your hand (see pictures above).  Managing portion sizes  If your weight is a concern, reducing your portions can help. You can eat more than one serving of a food at once. But to keep from eating too much at one meal, learn how to manage your portions. A portion is the amount of each type of food on your plate. See the plate diagram for an example of balanced portions.  Date Last Reviewed: 3/1/2016  © 0155-7433 [employer]. [address] [place] [address], [place] [address]. All rights reserved. This information is not intended as a substitute for professional medical care. Always follow your healthcare professional's instructions.        Understanding Carbohydrates, Fats, and Protein  Food is a source of fuel and nourishment for your body. Its also a source of pleasure. Having diabetes doesnt mean you have to eat special foods or give up desserts. Instead, your dietitian can show you how to plan meals to suit your body. To start, learn how different foods affect blood  sugar.  Carbohydrates  Carbohydrates are the main source of fuel for the body. Carbohydrates raise blood sugar. Many people think carbohydrates are only found in pasta or bread. But carbohydrates are actually in many kinds of foods:  · Sugars occur naturally in foods such as fruit, milk, honey, and molasses. Sugars can also be added to many foods, from cereals and yogurt to candy and desserts. Sugars raise blood sugar.  · Starches are found in bread, cereals, pasta, and dried beans. Theyre also found in corn, peas, potatoes, yam, acorn squash, and butternut squash. Starches also raise blood sugar.   · Fiber is found in foods such as vegetables, fruits, beans, and whole grains. Unlike other carbs, fiber isnt digested or absorbed. So it doesnt raise blood sugar. In fact, fiber can help keep blood sugar from rising too fast. It also helps keep blood cholesterol at a healthy level.  Did you know?  Even though carbohydrates raise blood sugar, its best to have some in every meal. They are an important part of a healthy diet.   Fat  Fat is an energy source that can be stored until needed. Fat does not raise blood sugar. However, it can raise blood cholesterol, increasing the risk of heart disease. Fat is also high in calories, which can cause weight gain. Not all types of fat are the same.  More Healthy:  · Monounsaturated fats are mostly found in vegetable oils, such as olive, canola, and peanut oils. They are also found in avocados and some nuts. Monounsaturated fats are healthy for your heart. Thats because they lower LDL (unhealthy) cholesterol.  · Polyunsaturated fats are mostly found in vegetable oils, such as corn, safflower, and soybean oils. They are also found in some seeds, nuts, and fish. Polyunsaturated fats lower LDL (unhealthy) cholesterol. So, choosing them instead of saturated fats is healthy for your heart. Certain unsaturated fats can help lower triglycerides.   Less Healthy:  · Saturated fats are  found in animal products, such as meat, poultry, whole milk, lard, and butter. Saturated fats raise LDL cholesterol and are not healthy for your heart.  · Hydrogenated oils and trans fats are formed when vegetable oils are processed into solid fats. They are found in many processed foods. Hydrogenated oils and trans fats raise LDL cholesterol and lower HDL (healthy) cholesterol. They are not healthy for your heart.  Protein  Protein helps the body build and repair muscle and other tissue. Protein has little or no effect on blood sugar. However, many foods that contain protein also contain saturated fat. By choosing low-fat protein sources, you can get the benefits of protein without the extra fat:  · Plant protein is found in dry beans and peas, nuts, and soy products, such as tofu and soymilk. These sources tend to be cholesterol-free and low in saturated fat.  · Animal protein is found in fish, poultry, meat, cheese, milk, and eggs. These contain cholesterol and can be high in saturated fat. Aim for lean, lower-fat choices.  Date Last Reviewed: 3/1/2016  © 5477-3737 [employer]. [address] [place] [address]. All rights reserved. This information is not intended as a substitute for professional medical care. Always follow your healthcare professional's instructions.        Healthy Meals for Diabetes     A healthcare provider will help you develop a meal plan that fits your needs.   Ask your healthcare team to help you make a meal plan that fits your needs. Your meal plan tells you when to eat your meals and snacks, what kinds of foods to eat, and how much of each food to eat. You dont have to give up all the foods you like. But you do need to follow some guidelines.  Choose healthy carbohydrates  Starches, sugars, and fiber are all types of carbohydrates. Fiber can help lower your cholesterol and triglycerides. Fiber is also healthy for your heart. You should have 20 to 35 grams of total  fiber each day. Fiber-rich foods include:  · Whole-grain breads and cereals  · Bulgur wheat  · Brown rice     · Whole-wheat pasta  · Fruits and vegetables  · Dry beans, and peas   Keep track of the amount of carbohydrates you eat. This can help you keep the right balance of physical activity and medicine. The amount of carbohydrates needed will vary for each person. It depends on many things such as your health, the medicines you take, and how active you are. Your healthcare team will help you figure out the right amount of carbohydrates for you. You may start with around 45 to 60 grams of carbohydrates per meal, depending on your situation.   Here are some examples of foods containing about 15 grams of carbohydrates (1 serving of carbohydrates):  · 1/2 cup of canned or frozen fruit  · A small piece of fresh fruit (4 ounces)  · 1 slice of bread  · 1/2 cup of oatmeal  · 1/3 cup of rice  · 4 to 6 crackers  · 1/2 English muffin  · 1/2 cup of black beans  · 1/4 of a large baked potato (3 ounces)  · 2/3 cup of plain fat-free yogurt  · 1 cup of soup  · 1/2 cup of casserole  · 6 chicken nuggets  · 2-inch-square brownie or cake without frosting  · 2 small cookies  · 1/2 cup of ice cream or sherbet  Choose healthy protein foods  Eating protein that is low in fat can help you control your weight. It also helps keep your heart healthy. Low-fat protein foods include:  · Fish  · Plant proteins, such as dry beans and peas, nuts, and soy products like tofu and soymilk  · Lean meat with all visible fat removed  · Poultry with the skin removed  · Low-fat or nonfat milk, cheese, and yogurt  Limit unhealthy fats and sugar  Saturated and trans fats are unhealthy for your heart. They raise LDL (bad) cholesterol. Fat is also high in calories, so it can make you gain weight. To cut down on unhealthy fats and sugar, limit these foods:  · Butter or margarine  · Palm and palm kernel oils and coconut oil  · Cream  · Cheese  · [place]  · Lunch  meats     · Ice cream  · Sweet bakery goods such as pies, muffins, and donuts  · Jams and jellies  · Candy bars  · Regular sodas   How much to eat  The amount of food you eat affects your blood sugar. It also affects your weight. Your healthcare team will tell you how much of each type of food you should eat.  · Use measuring cups and spoons and a food scale to measure serving sizes.  · Learn what a correct serving size looks like on your plate. This will help when you are away from home and cant measure your servings.  · Eat only the number of servings given on your meal plan for each food. Dont take seconds.  · Learn to read food labels. Be sure to look at serving size, total carbohydrates, fiber, calories, sugar, and saturated and trans fats. Look for healthier alternatives to foods that have added sugar.  · Plan ahead for parties so you can still have a good time without going overboard with unhealthy food choices. Set a good example yourself by bringing a healthy dish to pot lucks.   Choose healthy snacks  When it comes to snacks, we usually think about foods with added sugar and fats. But there are many other options for healthier snack choices. Here are a few snack ideas to choose from:  Snacks with less than 5 grams of carbohydrates  · 1 piece of string cheese  · 3 celery sticks plus 1 tablespoon of peanut butter  · 5 cherry tomatoes plus 1 tablespoon of ranch dressing  · 1 hard-boiled egg  · 1/4 cup of fresh blueberries  ·  5 baby carrots  · 1 cup of light popcorn  · 1/2 cup of sugar-free gelatin  · 15 almonds  Snacks with about 10 to 20 grams of carbohydrates  · 1/3 cup of hummus plus 1 cup of fresh cut nonstarchy vegetables (carrots, green peppers, broccoli, celery, or a combination)  · 1/2 cup of fresh or canned fruit plus 1/4 cup of cottage cheese  · 1/2 cup of tuna salad with 4 crackers  · 2 rice cakes and a tablespoon of peanut butter  · 1 small apple or orange  · 3 cups light popcorn  · 1/2 of a  turkey sandwich (1 slice of whole-wheat bread, 2 ounces of turkey, and mustard)  Portion sizes are important to controlling your blood sugar and staying at a healthy weight. Stock up on healthy snack items so you always have them on hand.  When to eat  Your meal plan will likely include breakfast, lunch, dinner, and some snacks.  · Try to eat your meals and snacks at about the same times each day.  · Eat all your meals and snacks. Skipping a meal or snack can make your blood sugar drop too low. It can also cause you to eat too much at the next meal or snack. Then your blood sugar could get too high.  Date Last Reviewed: 7/1/2016  © 0758-0262 [employer]. [address] [place] [address], [place] [address]. All rights reserved. This information is not intended as a substitute for professional medical care. Always follow your healthcare professional's instructions.

## 2017-03-15 ENCOUNTER — TELEPHONE (OUTPATIENT)
Dept: FAMILY MEDICINE | Facility: CLINIC | Age: 52
End: 2017-03-15

## 2017-03-15 NOTE — TELEPHONE ENCOUNTER
Spoke to patient to schedule diabetes education and there were no more appointment slots. Patient states she will call back once her daughter is recovering from surgery. Apologized for the inconvenience.

## 2017-03-16 ENCOUNTER — PATIENT OUTREACH (OUTPATIENT)
Dept: OTHER | Facility: OTHER | Age: 52
End: 2017-03-16
Payer: COMMERCIAL

## 2017-03-16 NOTE — PROGRESS NOTES
Last 5 Patient Entered Readings                                                               Current 30 Day Average: 136/85     Recent Readings 3/15/2017 3/12/2017 3/12/2017 3/11/2017 3/10/2017    Systolic BP (mmHg) 150 146 122 128 132    Diastolic BP (mmHg) 81 91 82 84 90    Pulse 94 100 93 99 97        Daughter is in the hospital. Stress. Schedule is all over the place right now. , off on Mondays. Not currently working out regularly. Moving at lot at work. Has been told about low sodium diet before but doesn't follow it.    Initial introduction completed with the Ms. Vijaya Brantleychistacey and the role of the health  was explained via [facility].   Expectations and the process of the program was explained as well. I encouraged her to call or message me back with any questions she may have. I will follow up in a few weeks to see how she are doing.     Resources on diet and exercise were sent.     she is aware that I am not available for emergencies and to call 911 or [facility] call if one arises.  she is aware of the importance of medication adherence.  she is aware of the importance of diet and exercise.  she is aware that his sodium intake should be no more than 2000mg per day.  she is aware that the recommended physical activity each week should be about 30 minutes per day at least 5 times per week.   she is aware of the importance of taking BP readings at least weekly if not more and during different times each day.  she is aware that the health  can be used as a resource for lifestyle modifications to help reduce or maintain a healthy BP

## 2017-03-28 ENCOUNTER — PATIENT OUTREACH (OUTPATIENT)
Dept: OTHER | Facility: OTHER | Age: 52
End: 2017-03-28
Payer: COMMERCIAL

## 2017-03-28 NOTE — LETTER
"   Karley Campbell, PharmD  [address] [place] [address]     Dear Vijaya Montenegro,    Welcome to the [facility]!         My name is Karley Campbell and I am your dedicated clinical pharmacist.  As an expert in medication management, I will help ensure that the medications you are taking continue to provide you with the intended benefits.      This is Meghann Herrera and she will be your health  for the duration of the program.  Her job is to help you identify lifestyle changes to improve your blood pressure control.  You will talk about nutrition, exercise, and other ways that you may be able to adjust your current habits to better your health. Together, we will work to improve your overall health and encourage you to meet your goals for a healthier lifestyle.    What we expect from YOU:    You will need to take blood pressure readings multiple times a week and no less than one reading per week.   It is important that you take your measurements at different times during the day, when possible.     What you should expect from your Digital Medicine Care Team:   We will provide you with education about high blood pressure, including lifestyle changes that could help you to control your blood pressure.   We will review your weekly readings and provide you with monthly blood pressure progress reports after you have been in the program for more than 30 days.   We will send monthly progress reports on your blood pressure control to your physician so they can follow along with your progress as well.    You will be able to reach me by phone at   or through your [facility] account by clicking "Send a message to your doctor's office" on the home screen then selecting my name in the "To the office of:" field.     I look forward to working with you to achieve your blood pressure goals!    Sincerely,    Karley Campbell PharmD  Your personal Clinical Pharmacist    Please visit " [email or web address] to learn more about high blood pressure and what you can do lower your blood pressure.                                                                                         Vijaya Montenegro  [address] [address] [address]

## 2017-04-04 ENCOUNTER — PATIENT MESSAGE (OUTPATIENT)
Dept: FAMILY MEDICINE | Facility: CLINIC | Age: 52
End: 2017-04-04

## 2017-04-04 DIAGNOSIS — R30.0 DYSURIA: Primary | ICD-10-CM

## 2017-04-04 NOTE — TELEPHONE ENCOUNTER
Spoke w/patient, states since starting the Metformin, she is experiencing frequent and mild burning with urination, no discharge or odor x2/days. Please send to [facility].

## 2017-04-04 NOTE — TELEPHONE ENCOUNTER
Spoke w/patient, informed of recommendation. States she will call office on Monday if not resolved. States Thank you.

## 2017-04-17 NOTE — PROGRESS NOTES
Last 5 Patient Entered Readings                                                               Current 30 Day Average: 124/77     Recent Readings 4/11/2017 4/6/2017 4/5/2017 4/4/2017 4/3/2017    Systolic BP (mmHg) 107 108 120 147 141    Diastolic BP (mmHg) 67 74 77 85 82    Pulse 87 91 100 91 83        Called Ms. Montenegro to introduce her into the Hypertension Digital Medicine Program.     Reviewed patient's medications and verified allergies on file.     Reviewed questionnaire:  Depression: not indicated  Sleep apnea: indicated- not interested in sleep study as she feels she does not have MEDHAT    Explained that we expect her to obtain several blood pressures/week at random times of day. Also asked that the BP be taken at least 1 hour after taking BP medications.     Explained that our goal is to get her BP to consistently below 140/90mmHg.     Patient and I agreed that the patient will take her BP daily to every other day at varying times of the day.     I will plan to follow-up with the patient in 2 weeks.    Emailed patient link to [facility] webpage as well as my direct phone number in case she has in questions.

## 2017-04-24 ENCOUNTER — PATIENT OUTREACH (OUTPATIENT)
Dept: OTHER | Facility: OTHER | Age: 52
End: 2017-04-24
Payer: COMMERCIAL

## 2017-04-24 NOTE — PROGRESS NOTES
Last 5 Patient Entered Readings                                                               Current 30 Day Average: 118/73     Recent Readings 4/20/2017 4/11/2017 4/6/2017 4/5/2017 4/4/2017    Systolic BP (mmHg) 101 107 108 120 147    Diastolic BP (mmHg) 64 67 74 77 85    Pulse 78 87 91 100 91        Feeling well.   Diet is improving but still not exercising.   Has discussed lower readings with PCP and not concerned bc she has no symptoms.  No questions or concerns.    Follow up with Ms. Vijaya Montenegro completed. Patient is maintaining a low sodium diet and continuing her exercise regime. Patient did not have any further questions or concerns. I will follow up in a few weeks to see how she is doing and progressing.

## 2017-05-15 ENCOUNTER — PATIENT OUTREACH (OUTPATIENT)
Dept: OTHER | Facility: OTHER | Age: 52
End: 2017-05-15
Payer: COMMERCIAL

## 2017-05-15 NOTE — PROGRESS NOTES
Last 5 Patient Entered Readings                                                               Current 30 Day Average: 107/70     Recent Readings 5/9/2017 4/30/2017 4/20/2017 4/11/2017 4/6/2017    Systolic BP (mmHg) 109 111 101 107 108    Diastolic BP (mmHg) 70 76 64 67 74    Pulse 93 114 78 87 91        Ms. Hammond BP is at goal. She denies any symptoms and has no questions/concerns. Asked that she let us know if BP gets low and she has LH/dizziness/weakness and we can discuss reducing medications.     Current HTN regimen:  Hypertension Medications             hydrochlorothiazide (MICROZIDE) 12.5 mg capsule Take 1 capsule (12.5 mg total) by mouth once daily.    irbesartan (AVAPRO) 150 MG tablet Take 1 tablet (150 mg total) by mouth once daily.    metoprolol tartrate (LOPRESSOR) 50 MG tablet TAKE 1 TABLET(S) BY MOUTH THREE TIMES DAILY          Will continue to monitor regularly. Will follow up in 3 months, sooner if BP begins to trend upward or downward.    Patient has my contact information and knows to call with any concerns or clinical changes.

## 2017-05-22 ENCOUNTER — PATIENT OUTREACH (OUTPATIENT)
Dept: OTHER | Facility: OTHER | Age: 52
End: 2017-05-22
Payer: COMMERCIAL

## 2017-05-22 NOTE — PROGRESS NOTES
Last 5 Patient Entered Readings                                                               Current 30 Day Average: 119/76     Recent Readings 5/19/2017 5/9/2017 4/30/2017 4/20/2017 4/11/2017    Systolic BP (mmHg) 139 109 111 101 107    Diastolic BP (mmHg) 83 70 76 64 67    Pulse 95 93 114 78 87        LVM

## 2017-05-23 NOTE — PROGRESS NOTES
Last 5 Patient Entered Readings                                                               Current 30 Day Average: 119/76     Recent Readings 5/19/2017 5/9/2017 4/30/2017 4/20/2017 4/11/2017    Systolic BP (mmHg) 139 109 111 101 107    Diastolic BP (mmHg) 83 70 76 64 67    Pulse 95 93 114 78 87        Feeling well. Keeping up with diet and exercise routine. Pleased with BP readings. No questions or concerns.    Follow up with Ms. Vijaya Montenegro completed. Patient is maintaining a low sodium diet and continuing her exercise regime. Patient did not have any further questions or concerns. I will follow up in a few weeks to see how she is doing and progressing.

## 2017-06-13 ENCOUNTER — PATIENT OUTREACH (OUTPATIENT)
Dept: OTHER | Facility: OTHER | Age: 52
End: 2017-06-13
Payer: COMMERCIAL

## 2017-06-13 NOTE — PROGRESS NOTES
Last 5 Patient Entered Readings                                                               Current 30 Day Average: 118/78     Recent Readings 6/7/2017 6/7/2017 6/6/2017 5/27/2017 5/27/2017    Systolic BP (mmHg) 113 106 100 125 116    Diastolic BP (mmHg) 79 79 73 79 74    Pulse 99 90 110 118 109        Feeling well. Continuing diet and PA routine. No concerns.    Follow up with Ms. Vijaya Montenegro completed. Patient is maintaining a low sodium diet and continuing her exercise regime. Patient did not have any further questions or concerns. I will follow up in a few weeks to see how she is doing and progressing.

## 2017-06-14 ENCOUNTER — PATIENT OUTREACH (OUTPATIENT)
Dept: OTHER | Facility: OTHER | Age: 52
End: 2017-06-14
Payer: COMMERCIAL

## 2017-06-14 NOTE — PROGRESS NOTES
Last 5 Patient Entered Readings                                                               Current 30 Day Average: 118/78     Recent Readings 6/7/2017 6/7/2017 6/6/2017 5/27/2017 5/27/2017    Systolic BP (mmHg) 113 106 100 125 116    Diastolic BP (mmHg) 79 79 73 79 74    Pulse 99 90 110 118 109        Ms Lilly called back bc she remembered a question she wanted to discuss. She's feeling more fatigued than normal. Not sure if it's because her body is adjusting to being in a lower BP range or if something else is going on. Her fasting AM blood sugars have been around 130. Suggested she make sure she's eating foods rich in iron, b-12, and d for good energy levels. She will let me know if fatigue persists so we can discuss other possible factors.

## 2017-07-05 ENCOUNTER — PATIENT OUTREACH (OUTPATIENT)
Dept: OTHER | Facility: OTHER | Age: 52
End: 2017-07-05

## 2017-07-05 NOTE — PROGRESS NOTES
Last 5 Patient Entered Readings                                                               Current 30 Day Average: 109/75     Recent Readings 6/24/2017 6/16/2017 6/7/2017 6/7/2017 6/6/2017    Systolic BP (mmHg) 114 114 113 106 100    Diastolic BP (mmHg) 70 78 79 79 73    Pulse 127 97 99 90 110        Feeling well. Keeping up with diet/exercise.  No questions or concerns.    Follow up with Ms. Vijaya Montenegro completed. Patient is maintaining a low sodium diet and continuing her exercise regime. Patient did not have any further questions or concerns. I will follow up in a few weeks to see how she is doing and progressing.

## 2017-08-04 ENCOUNTER — PATIENT OUTREACH (OUTPATIENT)
Dept: OTHER | Facility: OTHER | Age: 52
End: 2017-08-04

## 2017-08-04 NOTE — PROGRESS NOTES
Last 5 Patient Entered Redings Current 30 Day Average: 114/77     Recent Readings 7/26/2017 7/16/2017 7/7/2017 6/24/2017 6/16/2017    Systolic BP (mmHg) 114 109 119 114 114    Diastolic BP (mmHg) 77 66 87 70 78    Pulse 98 92 88 127 97          lvm

## 2017-08-14 NOTE — PROGRESS NOTES
Last 5 Patient Entered Redings Current 30 Day Average: 108/71     Recent Readings 8/9/2017 7/26/2017 7/16/2017 7/7/2017 6/24/2017    Systolic BP (mmHg) 100 114 109 119 114    Diastolic BP (mmHg) 71 77 66 87 70    Pulse 83 98 92 88 127        Starting working out at gym 3x/wk. Feeling great. Pleased with BP readings.  No questions or concerns.    Follow up with Ms. Vijaya Montenegro completed. Patient is maintaining a low sodium diet and continuing her exercise regime. Patient did not have any further questions or concerns. I will follow up in a few weeks to see how she is doing and progressing.

## 2017-09-11 ENCOUNTER — PATIENT OUTREACH (OUTPATIENT)
Dept: OTHER | Facility: OTHER | Age: 52
End: 2017-09-11

## 2017-09-11 NOTE — PROGRESS NOTES
Last 5 Patient Entered Redings Current 30 Day Average: 123/78     Recent Readings 9/6/2017 8/27/2017 8/19/2017 8/9/2017 7/26/2017    Systolic BP (mmHg) 124 128 116 100 114    Diastolic BP (mmHg) 80 80 74 71 77    Pulse 95 87 87 83 98        Hypertension Digital Medicine Program (HDMP): Health  Follow Up    Feeling great. Pleased with readings.    Lifestyle Modifications:    1. Low sodium diet: yes    2.Physical activity: yes     Follow up with Ms. Vijaya Montenegro completed. No further questions or concerns. I will follow up in a few weeks to assess progress.

## 2017-09-13 DIAGNOSIS — I10 ESSENTIAL HYPERTENSION: Primary | ICD-10-CM

## 2017-09-13 DIAGNOSIS — E11.9 DIABETES MELLITUS WITHOUT COMPLICATION: ICD-10-CM

## 2017-09-13 DIAGNOSIS — E78.5 HYPERLIPIDEMIA WITH TARGET LDL LESS THAN 100: ICD-10-CM

## 2017-09-13 RX ORDER — METFORMIN HYDROCHLORIDE 500 MG/1
500 TABLET, EXTENDED RELEASE ORAL
Qty: 90 TABLET | Refills: 0 | Status: SHIPPED | OUTPATIENT
Start: 2017-09-13 | End: 2017-12-15 | Stop reason: SDUPTHER

## 2017-09-13 RX ORDER — PRAVASTATIN SODIUM 10 MG/1
10 TABLET ORAL DAILY
Qty: 90 TABLET | Refills: 0 | Status: SHIPPED | OUTPATIENT
Start: 2017-09-13 | End: 2017-12-22 | Stop reason: SDUPTHER

## 2017-09-13 NOTE — TELEPHONE ENCOUNTER
Refill request. OV 12/13/17, requesting lab orders. Please review. No eye exam this calendar year.

## 2017-09-13 NOTE — TELEPHONE ENCOUNTER
Orders signed - please schedule.  Please schedule patient for eyecam on same day (before appt with me) as she is seeing me.

## 2017-09-18 ENCOUNTER — PATIENT OUTREACH (OUTPATIENT)
Dept: OTHER | Facility: OTHER | Age: 52
End: 2017-09-18

## 2017-09-18 DIAGNOSIS — I10 ESSENTIAL HYPERTENSION: ICD-10-CM

## 2017-09-18 RX ORDER — IRBESARTAN 150 MG/1
75 TABLET ORAL DAILY
Qty: 30 TABLET | Refills: 5
Start: 2017-09-18 | End: 2017-11-06 | Stop reason: SDUPTHER

## 2017-09-18 NOTE — PROGRESS NOTES
Last 5 Patient Entered Redings Current 30 Day Average: 121/79     Recent Readings 9/15/2017 9/6/2017 8/27/2017 8/19/2017 8/9/2017    Systolic BP (mmHg) 115 124 128 116 100    Diastolic BP (mmHg) 83 80 80 74 71    Pulse 91 95 87 87 83        Ms. Montenegro's BP is well controlled. She has no s/s of hypotension. She would like to start discontinuing some of her medications. Will reduce irbesartan to 75 mg QD and reassess BP in 2-3 weeks. Asked Ms. Montenegro to take readings more frequently, every other day, so that I have enough information to make adjustments to her regimen. She verbalized understanding.     Current HTN regimen:  Hypertension Medications             hydrochlorothiazide (MICROZIDE) 12.5 mg capsule Take 1 capsule (12.5 mg total) by mouth once daily.    irbesartan (AVAPRO) 150 MG tablet Take 0.5 tablets (75 mg total) by mouth once daily.    metoprolol tartrate (LOPRESSOR) 50 MG tablet TAKE 1 TABLET(S) BY MOUTH THREE TIMES DAILY        Patient has my contact information and knows to call with any concerns or clinical changes.

## 2017-09-29 DIAGNOSIS — I10 ESSENTIAL HYPERTENSION: ICD-10-CM

## 2017-09-29 RX ORDER — HYDROCHLOROTHIAZIDE 12.5 MG/1
CAPSULE ORAL
Qty: 30 CAPSULE | Refills: 0 | Status: SHIPPED | OUTPATIENT
Start: 2017-09-29 | End: 2017-11-06 | Stop reason: SDUPTHER

## 2017-09-29 RX ORDER — METOPROLOL TARTRATE 50 MG/1
TABLET ORAL
Qty: 90 TABLET | Refills: 0 | Status: SHIPPED | OUTPATIENT
Start: 2017-09-29 | End: 2017-10-30 | Stop reason: SDUPTHER

## 2017-10-09 ENCOUNTER — PATIENT OUTREACH (OUTPATIENT)
Dept: OTHER | Facility: OTHER | Age: 52
End: 2017-10-09

## 2017-10-16 ENCOUNTER — PATIENT OUTREACH (OUTPATIENT)
Dept: OTHER | Facility: OTHER | Age: 52
End: 2017-10-16

## 2017-10-16 NOTE — PROGRESS NOTES
Last 5 Patient Entered Redings Current 30 Day Average: 121/75     Recent Readings 10/13/2017 10/12/2017 10/2/2017 10/1/2017 9/28/2017    Systolic BP (mmHg) 101 144 116 104 127    Diastolic BP (mmHg) 61 76 80 75 77    Pulse 95 77 109 92 109        Hypertension Digital Medicine Program (HDMP): Health  Follow Up    Feeling well but having more frequent panic attacks. Stated she hasn't taken xanax in months but has felt the need to take 1/2 xanax a few times in the last couple weeks. Triggers are from all of the natural disasters happening and news stories. Encouraged her to talk to Dr Hoover if the panic attacks continue.    Ms Lilly thinks the higher reading of 144/76 is from user error. The cord of the cuff wasn't fully inserted into the base.    Lifestyle Modifications:    1.Low sodium diet: yes - continuing to follow low sodium diet    2.Physical activity: yes - staying active - mostly through walking    3.Hypotension/Hypertension symptoms: no   Frequency/Alleviating factors/Precipitating factors, etc.     4.Patient has been compliant with the medication regimen.     Follow up with Ms. Vijaya Montenegro completed. No further questions or concerns. I will follow up in a few weeks to assess progress.

## 2017-10-30 DIAGNOSIS — I10 ESSENTIAL HYPERTENSION: ICD-10-CM

## 2017-10-30 RX ORDER — METOPROLOL TARTRATE 50 MG/1
TABLET ORAL
Qty: 90 TABLET | Refills: 0 | Status: SHIPPED | OUTPATIENT
Start: 2017-10-30 | End: 2017-12-01 | Stop reason: SDUPTHER

## 2017-11-06 DIAGNOSIS — I10 ESSENTIAL HYPERTENSION: ICD-10-CM

## 2017-11-06 RX ORDER — HYDROCHLOROTHIAZIDE 12.5 MG/1
12.5 CAPSULE ORAL DAILY
Qty: 30 CAPSULE | Refills: 0 | Status: SHIPPED | OUTPATIENT
Start: 2017-11-06 | End: 2017-12-04 | Stop reason: SDUPTHER

## 2017-11-06 RX ORDER — IRBESARTAN 150 MG/1
75 TABLET ORAL DAILY
Qty: 30 TABLET | Refills: 0
Start: 2017-11-06 | End: 2017-11-10 | Stop reason: SDUPTHER

## 2017-11-10 DIAGNOSIS — I10 ESSENTIAL HYPERTENSION: ICD-10-CM

## 2017-11-10 RX ORDER — IRBESARTAN 150 MG/1
75 TABLET ORAL DAILY
Qty: 30 TABLET | Refills: 0 | Status: SHIPPED | OUTPATIENT
Start: 2017-11-10 | End: 2017-12-04 | Stop reason: SDUPTHER

## 2017-11-20 ENCOUNTER — PATIENT OUTREACH (OUTPATIENT)
Dept: OTHER | Facility: OTHER | Age: 52
End: 2017-11-20

## 2017-11-20 NOTE — PROGRESS NOTES
Last 5 Patient Entered Readings Current 30 Day Average: 128/78     Recent Readings 11/18/2017 11/15/2017 11/15/2017 10/29/2017 10/29/2017    Systolic BP (mmHg) 124 143 150 124 143    Diastolic BP (mmHg) 72 76 88 76 77    Pulse 73 81 86 74 84        Hypertension Digital Medicine Program (HDMP): Health  Follow Up    Feeling well. Concerned with higher readings but once we discussed what it could be - most likely due to diet.    Lifestyle Modifications:    1.Low sodium diet: in the last week has been dining out more frequently. ~4x last week.  got a new job and they've been staying in the city instead of at home. Encouraged her to request food be prepared without salt, choose more healthful options.    2.Physical activity: yes active job, always on her feet    3.Hypotension/Hypertension symptoms: no   Frequency/Alleviating factors/Precipitating factors, etc.     4.Patient has been compliant with the medication regimen.     Follow up with Mrs. Pately Herlinda Montenegro completed. No further questions or concerns. I will follow up in a few weeks to assess progress.

## 2017-12-01 DIAGNOSIS — I10 ESSENTIAL HYPERTENSION: ICD-10-CM

## 2017-12-01 RX ORDER — METOPROLOL TARTRATE 50 MG/1
TABLET ORAL
Qty: 90 TABLET | Refills: 1 | Status: SHIPPED | OUTPATIENT
Start: 2017-12-01 | End: 2018-03-01 | Stop reason: SDUPTHER

## 2017-12-04 DIAGNOSIS — I10 ESSENTIAL HYPERTENSION: ICD-10-CM

## 2017-12-04 RX ORDER — IRBESARTAN 150 MG/1
75 TABLET ORAL DAILY
Qty: 30 TABLET | Refills: 0 | Status: SHIPPED | OUTPATIENT
Start: 2017-12-04 | End: 2017-12-13 | Stop reason: SDUPTHER

## 2017-12-04 RX ORDER — HYDROCHLOROTHIAZIDE 12.5 MG/1
CAPSULE ORAL
Qty: 30 CAPSULE | Refills: 0 | Status: SHIPPED | OUTPATIENT
Start: 2017-12-04 | End: 2017-12-13 | Stop reason: SDUPTHER

## 2017-12-06 ENCOUNTER — LAB VISIT (OUTPATIENT)
Dept: LAB | Facility: HOSPITAL | Age: 52
End: 2017-12-06
Attending: INTERNAL MEDICINE
Payer: COMMERCIAL

## 2017-12-06 DIAGNOSIS — E11.9 DIABETES MELLITUS WITHOUT COMPLICATION: ICD-10-CM

## 2017-12-06 LAB
ESTIMATED AVG GLUCOSE: 143 MG/DL
HBA1C MFR BLD HPLC: 6.6 %

## 2017-12-06 PROCEDURE — 83036 HEMOGLOBIN GLYCOSYLATED A1C: CPT

## 2017-12-06 PROCEDURE — 36415 COLL VENOUS BLD VENIPUNCTURE: CPT | Mod: PO

## 2017-12-13 ENCOUNTER — OFFICE VISIT (OUTPATIENT)
Dept: FAMILY MEDICINE | Facility: CLINIC | Age: 52
End: 2017-12-13
Payer: COMMERCIAL

## 2017-12-13 ENCOUNTER — TELEPHONE (OUTPATIENT)
Dept: OPHTHALMOLOGY | Facility: CLINIC | Age: 52
End: 2017-12-13

## 2017-12-13 ENCOUNTER — CLINICAL SUPPORT (OUTPATIENT)
Dept: OPHTHALMOLOGY | Facility: CLINIC | Age: 52
End: 2017-12-13
Attending: INTERNAL MEDICINE
Payer: COMMERCIAL

## 2017-12-13 VITALS
HEIGHT: 61 IN | OXYGEN SATURATION: 96 % | BODY MASS INDEX: 32.42 KG/M2 | SYSTOLIC BLOOD PRESSURE: 110 MMHG | WEIGHT: 171.75 LBS | DIASTOLIC BLOOD PRESSURE: 80 MMHG | HEART RATE: 78 BPM | TEMPERATURE: 98 F

## 2017-12-13 DIAGNOSIS — K21.9 GASTROESOPHAGEAL REFLUX DISEASE, ESOPHAGITIS PRESENCE NOT SPECIFIED: ICD-10-CM

## 2017-12-13 DIAGNOSIS — E11.29 TYPE 2 DIABETES MELLITUS WITH PROTEINURIA: Primary | ICD-10-CM

## 2017-12-13 DIAGNOSIS — R80.9 TYPE 2 DIABETES MELLITUS WITH PROTEINURIA: Primary | ICD-10-CM

## 2017-12-13 DIAGNOSIS — E11.9 DIABETES MELLITUS WITHOUT COMPLICATION: ICD-10-CM

## 2017-12-13 DIAGNOSIS — F41.9 ANXIETY: ICD-10-CM

## 2017-12-13 DIAGNOSIS — E66.9 OBESITY (BMI 30-39.9): ICD-10-CM

## 2017-12-13 DIAGNOSIS — I10 ESSENTIAL HYPERTENSION: ICD-10-CM

## 2017-12-13 PROCEDURE — 92250 FUNDUS PHOTOGRAPHY W/I&R: CPT | Mod: S$GLB,,, | Performed by: OPHTHALMOLOGY

## 2017-12-13 PROCEDURE — 99999 PR PBB SHADOW E&M-EST. PATIENT-LVL III: CPT | Mod: PBBFAC,,,

## 2017-12-13 PROCEDURE — 99214 OFFICE O/P EST MOD 30 MIN: CPT | Mod: S$GLB,,, | Performed by: INTERNAL MEDICINE

## 2017-12-13 PROCEDURE — 99999 PR PBB SHADOW E&M-EST. PATIENT-LVL III: CPT | Mod: PBBFAC,,, | Performed by: INTERNAL MEDICINE

## 2017-12-13 RX ORDER — PANTOPRAZOLE SODIUM 40 MG/1
40 TABLET, DELAYED RELEASE ORAL DAILY PRN
Qty: 30 TABLET | Refills: 5 | Status: SHIPPED | OUTPATIENT
Start: 2017-12-13 | End: 2020-06-01

## 2017-12-13 RX ORDER — ALPRAZOLAM 0.25 MG/1
0.25 TABLET ORAL DAILY PRN
Qty: 30 TABLET | Refills: 0 | Status: SHIPPED | OUTPATIENT
Start: 2017-12-13 | End: 2018-08-13 | Stop reason: SDUPTHER

## 2017-12-13 RX ORDER — IRBESARTAN 150 MG/1
75 TABLET ORAL DAILY
Qty: 15 TABLET | Refills: 5 | Status: SHIPPED | OUTPATIENT
Start: 2017-12-13 | End: 2018-08-13 | Stop reason: SDUPTHER

## 2017-12-13 RX ORDER — HYDROCHLOROTHIAZIDE 12.5 MG/1
CAPSULE ORAL
Qty: 30 CAPSULE | Refills: 5 | Status: SHIPPED | OUTPATIENT
Start: 2017-12-13 | End: 2018-07-02 | Stop reason: SDUPTHER

## 2017-12-13 NOTE — PROGRESS NOTES
HISTORY OF PRESENT ILLNESS:  Vijaya Montenegro is a 52 y.o. female who presents to the clinic today for Hypertension (six month )  .  The patient presents to clinic today for follow-up of her type 2 diabetes mellitus complicated by proteinuria, hypertension, and reflux.  She is in the digital hypertension program.  They have made some medication changes.  Blood pressures have been well controlled recently.  She has also made some dietary improvements and is exercising more regularly.  She is disappointed that she hasn't lost any weight.  The patient states her blood sugars at home are well controlled.  She recently did an A1c and is here today to discuss the results.  The patient states she takes her proton X daily for reflux.  She sometimes still has symptoms despite taking the medication.  She will then take a Zantac.  She denies any significant amount of caffeine, chocolate, or peppermint intake.  She does not smoke.  She does tend to lay down within 3 hours of eating.  The patient reports a few panic attacks recently related to the bad weather that we have had.  She has had to take Xanax a couple times.  In general she doesn't take it on a regular basis.      PAST MEDICAL HISTORY:  Past Medical History:   Diagnosis Date    Anxiety     GERD (gastroesophageal reflux disease)     Gout, arthritis     Hyperlipidemia LDL goal < 100     Hypertension     Obesity     Type II or unspecified type diabetes mellitus without mention of complication, not stated as uncontrolled     diet controlled       PAST SURGICAL HISTORY:  Past Surgical History:   Procedure Laterality Date     SECTION, LOW TRANSVERSE      x2    REFRACTIVE SURGERY Bilateral     [facility]       SOCIAL HISTORY:  Social History     Social History    Marital status:      Spouse name: N/A    Number of children: 2    Years of education: N/A     Occupational History    Not on file.     Social History Main Topics     Smoking status: Never Smoker    Smokeless tobacco: Never Used    Alcohol use Yes    Drug use: No    Sexual activity: Yes     Partners: Male     Other Topics Concern    Not on file     Social History Narrative    No narrative on file       FAMILY HISTORY:  Family History   Problem Relation Age of Onset    Hypertension Father     Bladder Cancer Father     Cancer Father      bladder    Diabetes Sister     Heart attack Maternal Grandmother     Cataracts Maternal Grandmother     COPD Mother     Blindness Mother      left eye; eye injury    Thyroid disease Mother     No Known Problems Maternal Grandfather     Diabetes Paternal Grandmother     No Known Problems Paternal Grandfather     Diabetes Paternal Aunt     Diabetes Paternal Aunt     No Known Problems Maternal Aunt     No Known Problems Maternal Uncle     No Known Problems Paternal Uncle     Colon cancer Neg Hx     Amblyopia Neg Hx     Glaucoma Neg Hx     Macular degeneration Neg Hx     Retinal detachment Neg Hx     Strabismus Neg Hx     Stroke Neg Hx        ALLERGIES AND MEDICATIONS: updated and reviewed.  Review of patient's allergies indicates:   Allergen Reactions    Codeine Other (See Comments)     Medication List with Changes/Refills   Current Medications    BLOOD SUGAR DIAGNOSTIC STRP    Test one time daily.    LANCETS 31 GAUGE MISC    1 lancet by Misc.(Non-Drug; Combo Route) route 2 (two) times daily. Test BID    METFORMIN (GLUCOPHAGE-XR) 500 MG 24 HR TABLET    Take 1 tablet (500 mg total) by mouth daily with breakfast.    METOPROLOL TARTRATE (LOPRESSOR) 50 MG TABLET    TAKE 1 TABLET(S) BY MOUTH THREE TIMES DAILY    PRAVASTATIN (PRAVACHOL) 10 MG TABLET    Take 1 tablet (10 mg total) by mouth once daily.    PREVIFEM 0.25-35 MG-MCG PER TABLET    Take 1 tablet by mouth once daily.   Changed and/or Refilled Medications    Modified Medication Previous Medication    ALPRAZOLAM (XANAX) 0.25 MG TABLET alprazolam (XANAX) 0.25 MG tablet        Take 1 tablet (0.25 mg total) by mouth daily as needed for Anxiety.    Take 1 tablet (0.25 mg total) by mouth daily as needed for Anxiety.    HYDROCHLOROTHIAZIDE (MICROZIDE) 12.5 MG CAPSULE hydroCHLOROthiazide (MICROZIDE) 12.5 mg capsule       TAKE 1 CAPSULE(S) BY MOUTH ONCE A DAY    TAKE 1 CAPSULE(S) BY MOUTH ONCE A DAY    IRBESARTAN (AVAPRO) 150 MG TABLET irbesartan (AVAPRO) 150 MG tablet       Take 0.5 tablets (75 mg total) by mouth once daily.    Take 0.5 tablets (75 mg total) by mouth once daily.    PANTOPRAZOLE (PROTONIX) 40 MG TABLET pantoprazole (PROTONIX) 40 MG tablet       Take 1 tablet (40 mg total) by mouth daily as needed (heartburn). Take only as needed.    Take 1 tablet (40 mg total) by mouth once daily.          CARE TEAM:  Patient Care Team:  Lanette Hoover MD as PCP - General (Internal Medicine)  Meghann Herrera as Digital Medicine Health   Lanette Hoover MD as Hypertension Digital Medicine Responsible Provider (Internal Medicine)  Karley Campbell PharmD as Hypertension Digital Medicine Clinician (Pharmacist)         REVIEW OF SYSTEMS:  Review of Systems   Constitutional: Negative for chills, fever, malaise/fatigue and weight loss.   HENT: Negative for congestion, ear pain, nosebleeds and sore throat.    Eyes: Negative for blurred vision, pain and discharge.   Respiratory: Negative for cough, hemoptysis, shortness of breath and wheezing.    Cardiovascular: Negative for chest pain, palpitations, claudication and leg swelling.   Gastrointestinal: Positive for heartburn. Negative for abdominal pain, blood in stool, constipation, diarrhea, melena, nausea and vomiting.   Genitourinary: Negative for dysuria, frequency, hematuria and urgency.   Musculoskeletal: Negative for joint pain and myalgias.   Skin: Negative for itching and rash.   Neurological: Negative for dizziness, tremors, focal weakness, seizures, weakness and headaches.   Endo/Heme/Allergies: Negative for polydipsia. Does not  "bruise/bleed easily.   Psychiatric/Behavioral: Negative for depression, memory loss, substance abuse and suicidal ideas. The patient is nervous/anxious (she has had a few panic attacks recently related to bad weather events - took 1/2 xanax prn). The patient does not have insomnia.          PHYSICAL EXAM:   Vitals:    12/13/17 1014   BP: 110/80   Pulse: 78   Temp: 98 °F (36.7 °C)     Weight: 77.9 kg (171 lb 11.8 oz)   Height: 5' 1" (154.9 cm)   Body mass index is 32.45 kg/m².     General appearance - alert, well appearing, and in no distress and obese  Mental status - alert, oriented to person, place, and time, normal mood, behavior, speech, dress, motor activity, and thought processes  Eyes - pupils equal and reactive, extraocular eye movements intact, sclera anicteric  Mouth - mucous membranes moist, pharynx normal without lesions  Neck - supple, no significant adenopathy  Lymphatics - no palpable lymphadenopathy  Chest - clear to auscultation, no wheezes, rales or rhonchi, symmetric air entry  Heart - normal rate and regular rhythm, no gallops noted  Abdomen - soft, nontender, nondistended, no masses or organomegaly  Neurological - alert, oriented, normal speech, no focal findings or movement disorder noted, cranial nerves II through XII intact  Musculoskeletal - no joint tenderness, deformity or swelling, no muscular tenderness noted  Extremities - peripheral pulses normal, no pedal edema, no clubbing or cyanosis  Skin - normal coloration and turgor, no rashes, no suspicious skin lesions noted      Results for orders placed or performed in visit on 12/06/17   Hemoglobin A1c   Result Value Ref Range    Hemoglobin A1C 6.6 (H) 4.0 - 5.6 %    Estimated Avg Glucose 143 (H) 68 - 131 mg/dL          ASSESSMENT AND PLAN:  1. Type 2 diabetes mellitus with proteinuria  Diabetes currently is controlled. We discussed diabetic diet and regular exercise. We discussed home blood sugar monitoring, if appropriate. The current " medical regimen is effective;  continue present plan and medications.  She will consider the digital diabetes program.    2. Essential hypertension  Discussed sodium restriction, maintaining ideal body weight and regular exercise program as physiologic means to achieve blood pressure control. The patient will strive towards this. The current medical regimen is effective;  continue present plan and medications. Recommended patient to check home readings to monitor and see me for followup as scheduled or sooner as needed. Patient was educated that both decongestant and anti-inflammatory medication may raise blood pressure. Continue digital hypertension program.  - irbesartan (AVAPRO) 150 MG tablet; Take 0.5 tablets (75 mg total) by mouth once daily.  Dispense: 15 tablet; Refill: 5  - hydroCHLOROthiazide (MICROZIDE) 12.5 mg capsule; TAKE 1 CAPSULE(S) BY MOUTH ONCE A DAY  Dispense: 30 capsule; Refill: 5    3. Gastroesophageal reflux disease, esophagitis presence not specified  Symptoms controlled. Reflux precautions discussed (eliminate tobacco if a smoker; minimize caffeine, chocolate and red/white peppermint intake; avoid heavy and spicy meals; don't lay down within 2-3 hours after eating). Medication as needed. Patient asked to take medication breaks, if possible - discussed chronic use can limit calcium absorption, increases the risk for intestinal infections, and can cause kidney damage.  She may want to consider GI evaluation/treatment so she does not need medication regularly.  - pantoprazole (PROTONIX) 40 MG tablet; Take 1 tablet (40 mg total) by mouth daily as needed (heartburn). Take only as needed.  Dispense: 30 tablet; Refill: 5    4. Anxiety  Stable. Medication as needed.   - ALPRAZolam (XANAX) 0.25 MG tablet; Take 1 tablet (0.25 mg total) by mouth daily as needed for Anxiety.  Dispense: 30 tablet; Refill: 0    5. Obesity (BMI 30-39.9)  The patient is asked to make an attempt to improve diet and exercise  patterns to aid in medical management of this problem.            Return in about 6 months (around 6/13/2018) for annual exam. or sooner as needed.

## 2017-12-13 NOTE — Clinical Note
HPI    Pt here for diabetic eye exam with non-dilated fundus photos. Small pupils: yes Pt cooperative: yes; patient had trouble focusing on nasal field od, but I  was able to get a clear image.    Last edited by Breanna Downey on 12/13/2017  9:34 AM. (History)

## 2017-12-13 NOTE — TELEPHONE ENCOUNTER
----- Message from Rosita Angel sent at 12/13/2017  2:03 PM CST -----      ----- Message -----  From: Pravin Huang MD  Sent: 12/13/2017   1:28 PM  To: John Vallejo

## 2017-12-13 NOTE — PROGRESS NOTES
HPI     Pt here for diabetic eye exam with non-dilated fundus photos.  Small pupils: yes  Pt cooperative: yes; patient had trouble focusing on nasal field od, but I   was able to get a clear image.      Last edited by Breanna Downey on 12/13/2017  9:34 AM. (History)            Assessment /Plan     For exam results, see Encounter Report.    Diabetes mellitus without complication  -     Diabetic Eye Screening Photo      Please see Dr. Huang's progress notes for interpretation.

## 2017-12-15 DIAGNOSIS — E11.9 DIABETES MELLITUS WITHOUT COMPLICATION: ICD-10-CM

## 2017-12-15 RX ORDER — METFORMIN HYDROCHLORIDE 500 MG/1
TABLET, EXTENDED RELEASE ORAL
Qty: 90 TABLET | Refills: 0 | Status: SHIPPED | OUTPATIENT
Start: 2017-12-15 | End: 2018-03-05 | Stop reason: SDUPTHER

## 2017-12-18 ENCOUNTER — PATIENT OUTREACH (OUTPATIENT)
Dept: OTHER | Facility: OTHER | Age: 52
End: 2017-12-18

## 2017-12-18 NOTE — PROGRESS NOTES
Last 5 Patient Entered Readings Current 30 Day Average: 129/75       Units 12/17/2017 12/16/2017 12/15/2017 12/14/2017 12/13/2017    Time - 12:00 AM 12:00 AM 12:00 AM 12:00 AM 12:00 AM    Pulse bpm - - - - -    Steps Walked - 041 20 [id number]        Hypertension Digital Medicine Program (HDMP): Health  Follow Up    Feeling well.   Interested in trying [employer].    Lifestyle Modifications:    1.Low sodium diet: no - continued need for improved dietary habits    2.Physical activity: yes - staying active, active job - always on the move    Follow up with Mrs. Vijaya Brantleychistacey completed. No further questions or concerns. I will follow up in a few weeks to assess progress.

## 2017-12-22 DIAGNOSIS — E78.5 HYPERLIPIDEMIA WITH TARGET LDL LESS THAN 100: ICD-10-CM

## 2017-12-22 RX ORDER — PRAVASTATIN SODIUM 10 MG/1
TABLET ORAL
Qty: 90 TABLET | Refills: 0 | Status: SHIPPED | OUTPATIENT
Start: 2017-12-22 | End: 2018-03-19 | Stop reason: SDUPTHER

## 2018-01-09 ENCOUNTER — PATIENT MESSAGE (OUTPATIENT)
Dept: FAMILY MEDICINE | Facility: CLINIC | Age: 53
End: 2018-01-09

## 2018-01-15 ENCOUNTER — PATIENT OUTREACH (OUTPATIENT)
Dept: OTHER | Facility: OTHER | Age: 53
End: 2018-01-15

## 2018-01-15 NOTE — PROGRESS NOTES
Last 5 Patient Entered Readings                                      Current 30 Day Average: 145/82     Recent Readings 12/26/2017 12/18/2017 12/18/2017 12/10/2017 12/7/2017    SBP (mmHg) 148 142 145 121 136    DBP (mmHg) 90 74 80 66 81    Pulse 111 103 103 96 86        Hypertension Digital Medicine Program (HDMP): Health  Follow Up    Using a wrist cuff while [employer] cuff isn't working.   Having a lot of additional stress in her family. We discussed different strategies to get her family more involved with tasks to take some of the burden off of her. Will send resources via [employer].  Takes 1/2 Xanax due to frequent panic attacks.  Moving to [place] at some point this year but not switching physicians.     Lifestyle Modifications:    1.Low sodium diet: no - not being mindful of sodium / also having a lot of acid reflux. Will send resources.    2.Physical activity: Used to workout 3x/wk. Stated she's barely been going 1x/wk to the gym because she's bringing her mom to the doctor 3x/wk. Goal is to plan workouts and go 3x/wk again - was able to do it last week.    3.Hypotension/Hypertension symptoms: no   Frequency/Alleviating factors/Precipitating factors, etc.     4.Patient has been compliant with the medication regimen.     Follow up with . Vijaya Herlinda Montenegro completed. No further questions or concerns. I will follow up in a few weeks to assess progress.

## 2018-01-22 ENCOUNTER — TELEPHONE (OUTPATIENT)
Dept: FAMILY MEDICINE | Facility: CLINIC | Age: 53
End: 2018-01-22

## 2018-01-22 NOTE — TELEPHONE ENCOUNTER
----- Message from Hermelinda Downey sent at 1/22/2018  1:45 PM CST -----  Contact: Self  Pt calling to speak to a nurse regarding her meds. Please call [phone]

## 2018-01-23 NOTE — TELEPHONE ENCOUNTER
Spoke w/patient, states she need refills on her medications. States she will call back with the names.

## 2018-02-02 ENCOUNTER — PATIENT MESSAGE (OUTPATIENT)
Dept: FAMILY MEDICINE | Facility: CLINIC | Age: 53
End: 2018-02-02

## 2018-02-02 ENCOUNTER — PATIENT MESSAGE (OUTPATIENT)
Dept: ADMINISTRATIVE | Facility: OTHER | Age: 53
End: 2018-02-02

## 2018-02-05 ENCOUNTER — PATIENT MESSAGE (OUTPATIENT)
Dept: ADMINISTRATIVE | Facility: OTHER | Age: 53
End: 2018-02-05

## 2018-02-06 DIAGNOSIS — R80.9 TYPE 2 DIABETES MELLITUS WITH PROTEINURIA: Primary | ICD-10-CM

## 2018-02-06 DIAGNOSIS — E11.29 TYPE 2 DIABETES MELLITUS WITH PROTEINURIA: Primary | ICD-10-CM

## 2018-02-07 LAB
HPV, HIGH-RISK: NORMAL
PAP RECOMMENDATION EXT: NORMAL
PAP SMEAR: NORMAL

## 2018-02-12 ENCOUNTER — PATIENT OUTREACH (OUTPATIENT)
Dept: OTHER | Facility: OTHER | Age: 53
End: 2018-02-12

## 2018-02-12 NOTE — PROGRESS NOTES
Last 5 Patient Entered Readings                                      Current 30 Day Average:      Recent Readings 12/26/2017 12/18/2017 12/18/2017 12/10/2017 12/7/2017    SBP (mmHg) 148 142 145 121 136    DBP (mmHg) 90 74 80 66 81    Pulse 111 103 103 96 86          Hypertension Digital Medicine (HDMP) Health  Follow Up    LVM to follow up with Ms. Vijaya Brantleychistacey.    Requested pt input readings. Encouraged adherence to low sodium diet and physical activity guidelines. Advised patient to call or message with questions or concerns. WCB in 2 weeks.     Edit 2/26/18:  Hypertension Digital Medicine Program (HDMP): Health  Follow Up    Feeling well.  Went to the [facility] and got a new cuff but readings still aren't transmitting, asked her to log into [employer] from her phone. She requested tech support. Stated she has been taking readings at least once per week.    Lifestyle Modifications:    1.Low sodium diet: need for dietary improvements, requested information on T2D be sent to her email. Stated she's not sure how to eat to help control BG.    2.Physical activity: yes - staying active, always on her feet for work    3.Hypotension/Hypertension symptoms: no   Frequency/Alleviating factors/Precipitating factors, etc.     4.Patient has been compliant with the medication regimen.     Follow up with Ms. Vijaya Pate Lan completed. No further questions or concerns. I will follow up in a few weeks to assess progress.

## 2018-03-01 DIAGNOSIS — I10 ESSENTIAL HYPERTENSION: ICD-10-CM

## 2018-03-01 RX ORDER — METOPROLOL TARTRATE 50 MG/1
TABLET ORAL
Qty: 90 TABLET | Refills: 0 | Status: SHIPPED | OUTPATIENT
Start: 2018-03-01 | End: 2018-03-30 | Stop reason: SDUPTHER

## 2018-03-05 DIAGNOSIS — E11.9 DIABETES MELLITUS WITHOUT COMPLICATION: ICD-10-CM

## 2018-03-05 RX ORDER — METFORMIN HYDROCHLORIDE 500 MG/1
TABLET, EXTENDED RELEASE ORAL
Qty: 90 TABLET | Refills: 0 | Status: SHIPPED | OUTPATIENT
Start: 2018-03-05 | End: 2018-03-31 | Stop reason: SDUPTHER

## 2018-03-19 DIAGNOSIS — E78.5 HYPERLIPIDEMIA WITH TARGET LDL LESS THAN 100: ICD-10-CM

## 2018-03-19 RX ORDER — PRAVASTATIN SODIUM 10 MG/1
TABLET ORAL
Qty: 90 TABLET | Refills: 0 | Status: SHIPPED | OUTPATIENT
Start: 2018-03-19 | End: 2018-06-19 | Stop reason: SDUPTHER

## 2018-03-23 ENCOUNTER — PATIENT OUTREACH (OUTPATIENT)
Dept: OTHER | Facility: OTHER | Age: 53
End: 2018-03-23

## 2018-03-23 DIAGNOSIS — E11.29 TYPE 2 DIABETES MELLITUS WITH PROTEINURIA: Primary | ICD-10-CM

## 2018-03-23 DIAGNOSIS — R80.9 TYPE 2 DIABETES MELLITUS WITH PROTEINURIA: Primary | ICD-10-CM

## 2018-03-23 NOTE — PROGRESS NOTES
Last 5 Patient Entered Readings                                      Current 30 Day Average: 122/77     Recent Readings 3/19/2018 3/19/2018 3/18/2018 3/18/2018 3/14/2018    SBP (mmHg) 115 115 115 115 109    DBP (mmHg) 73 73 79 79 74    Pulse 106 106 88 88 88        Patient's BP average is controlled based on 2017 ACC/AHA HTN guidelines of goal BP <130/80.      Mrs. Montenegro's BP is controlled. She has been focusing on her sugar and carbohydrate intake, but maintains BP average at goal. Encouraged her to continue to monitor sodium intake as well. Also encouraged her to continue to get 30 minutes of physical activity in most days of the week.      Patient denies s/s of hypotension (lightheadedness, dizziness, nausea, fatigue) associated with low readings. Instructed patient to inform me if this occurs, patient confirms understanding.      Patient's health , Meghann Herrera, will be following up every 3-4 weeks. I will continue to monitor regularly and will follow up in 3-4 months, sooner if BP begins to trend upward or downward.    Patient has my contact information and knows to call with any concerns or clinical changes.     Current HTN regimen:  Hypertension Medications             hydroCHLOROthiazide (MICROZIDE) 12.5 mg capsule TAKE 1 CAPSULE(S) BY MOUTH ONCE A DAY    irbesartan (AVAPRO) 150 MG tablet Take 0.5 tablets (75 mg total) by mouth once daily.    metoprolol tartrate (LOPRESSOR) 50 MG tablet TAKE 1 TABLET(S) BY MOUTH THREE TIMES DAILY

## 2018-03-23 NOTE — PROGRESS NOTES
Last 6 Patient Entered Readings                                          Most Recent A1c: 6.6% (Goal: 7%)     Recent Readings 3/20/2018 3/19/2018 3/19/2018 3/13/2018 3/13/2018    Blood Glucose (mg/dL) 147 149 120 158 121        Called patient to introduce her to the Diabetes Digital Medicine Program.     Ms. Montenegro's BG fluctuates, but her A1C in December was at goal. She is having a hard time managing her diet and ensuring she has enough carbohydrates throughout the day. Reviewed options with her for meals that include 30 g of carbs and snacks that are equal to 15 g of carbs. She is working on portion control and reading nutrition labels to ensure she keeps her carb intake steady.     Reviewed patient's medications and verified allergies on file. Diabetes Medications             metFORMIN (GLUCOPHAGE-XR) 500 MG 24 hr tablet TAKE 1 TABLET(S) BY MOUTH ONCE A DAY with breakfast        Reviewed A1C goal of <7%. A1C in December ws 6.6%. Will repeat A1C in the next 3-4 weeks.     Patient has no other questions or concerns at this time. Will continue to monitor and follow up in 1-2 months, sooner if she submits any concerning blood glucose readings.     Sent patient my direct phone number in case she has any questions or concerns.

## 2018-03-26 NOTE — PROGRESS NOTES
Last 5 Patient Entered Readings                                      Current 30 Day Average: 122/77     Recent Readings 3/19/2018 3/19/2018 3/18/2018 3/18/2018 3/14/2018    SBP (mmHg) 115 115 115 115 109    DBP (mmHg) 73 73 79 79 74    Pulse 106 106 88 88 88          Last 6 Patient Entered Readings                                          Most Recent A1c: 6.6% (Goal: 7%)     Recent Readings 3/23/2018 3/20/2018 3/19/2018 3/19/2018 3/13/2018    Blood Glucose (mg/dL) 120 147 149 120 158        Diabetes and Hypertension Digital Medicine Program: Health  Follow Up    Feeling well.    Lifestyle Modifications:    1.Diet: yes - breakfast: 2 pieces 100% whole wheat toast with turkey sage, 1 egg, milk / Doesn't like snacking but felt she had to. Encouraged her to check her BG 2 hrs after breakfast and if she wasn't hungry and her BG was stable, she didn't need to snack.    2.Physical activity: yes -  Doing cardio for 30 minutes - either elliptical or treadmill - most days. Hasn't started using weights yet doesn't feel comfortable.    Follow up with Mrs. Vijaya Henriquezmary Montenegro completed. No further questions or concerns. I will follow up in a few weeks to assess progress.

## 2018-03-30 DIAGNOSIS — I10 ESSENTIAL HYPERTENSION: ICD-10-CM

## 2018-03-30 RX ORDER — METOPROLOL TARTRATE 50 MG/1
TABLET ORAL
Qty: 90 TABLET | Refills: 0 | Status: SHIPPED | OUTPATIENT
Start: 2018-03-30 | End: 2018-05-07 | Stop reason: SDUPTHER

## 2018-03-31 DIAGNOSIS — E11.9 DIABETES MELLITUS WITHOUT COMPLICATION: ICD-10-CM

## 2018-04-02 RX ORDER — METFORMIN HYDROCHLORIDE 500 MG/1
TABLET, EXTENDED RELEASE ORAL
Qty: 30 TABLET | Refills: 2 | Status: SHIPPED | OUTPATIENT
Start: 2018-04-02 | End: 2018-07-02 | Stop reason: SDUPTHER

## 2018-04-05 ENCOUNTER — LAB VISIT (OUTPATIENT)
Dept: LAB | Facility: HOSPITAL | Age: 53
End: 2018-04-05
Attending: INTERNAL MEDICINE
Payer: COMMERCIAL

## 2018-04-05 DIAGNOSIS — R80.9 TYPE 2 DIABETES MELLITUS WITH PROTEINURIA: ICD-10-CM

## 2018-04-05 DIAGNOSIS — E11.29 TYPE 2 DIABETES MELLITUS WITH PROTEINURIA: ICD-10-CM

## 2018-04-05 LAB
ESTIMATED AVG GLUCOSE: 140 MG/DL
HBA1C MFR BLD HPLC: 6.5 %

## 2018-04-05 PROCEDURE — 83036 HEMOGLOBIN GLYCOSYLATED A1C: CPT

## 2018-04-05 PROCEDURE — 36415 COLL VENOUS BLD VENIPUNCTURE: CPT | Mod: PO

## 2018-04-23 ENCOUNTER — PATIENT OUTREACH (OUTPATIENT)
Dept: OTHER | Facility: OTHER | Age: 53
End: 2018-04-23

## 2018-04-23 NOTE — PROGRESS NOTES
Last 6 Patient Entered Readings                                          Most Recent A1c: 6.5% (Goal: 7%)     Recent Readings 4/20/2018 4/18/2018 4/16/2018 4/13/2018 4/10/2018    Blood Glucose (mg/dL) 100 122 105 104 113        Ms. Montenegro is doing well. Her FPG readings have been stable. She had an A1C done a few weeks ago and it was down from 6.6% to 6.5%. She will start checking readings throughout the day going forward. She is asking about taking a daily aspirin. Advised she speak to Dr. Hoover about this to determine if this is something that would benefit her.     Patient's health , Meghann Herrera, will be following up every 3-4 weeks. I will continue to monitor regularly and will follow up in 1-2 months, sooner if there are any concerning blood glucose readings. .    Patient has my contact information and knows to call with any concerns or clinical changes.     Diabetes Medications             metFORMIN (GLUCOPHAGE-XR) 500 MG 24 hr tablet TAKE 1 TABLET BY MOUTH EVERY DAY with breakfast

## 2018-05-07 DIAGNOSIS — I10 ESSENTIAL HYPERTENSION: ICD-10-CM

## 2018-05-07 RX ORDER — METOPROLOL TARTRATE 50 MG/1
TABLET ORAL
Qty: 90 TABLET | Refills: 0 | Status: SHIPPED | OUTPATIENT
Start: 2018-05-07 | End: 2018-07-02 | Stop reason: SDUPTHER

## 2018-05-23 ENCOUNTER — PATIENT OUTREACH (OUTPATIENT)
Dept: OTHER | Facility: OTHER | Age: 53
End: 2018-05-23

## 2018-05-23 NOTE — PROGRESS NOTES
Last 5 Patient Entered Readings                                      Current 30 Day Average: 113/75     Recent Readings 5/14/2018 5/14/2018 5/6/2018 5/6/2018 4/30/2018    SBP (mmHg) 116 116 118 118 106    DBP (mmHg) 76 76 72 72 76    Pulse 92 92 93 93 87        Last 6 Patient Entered Readings                                          Most Recent A1c: 6.5% (Goal: 7%)     Recent Readings 5/15/2018 5/1/2018 4/25/2018 4/20/2018 4/18/2018    Blood Glucose (mg/dL) 115 120 125 100 122        Ms. Montenegro has moved to [place] temporarily for her 's job. She was busy with the move which is why she has not checked her BP or BG recently. Her BP readings and BG readings have been controlled.     Patient's BP average is controlled based on 2017 ACC/AHA HTN guidelines of goal BP <130/80.      Patient's health , Meghann Herrera, will be following up every 3-4 weeks. I will continue to monitor regularly and will follow up in 2-3 months, sooner if BP begins to trend upward or downward.    Patient has my contact information and knows to call with any concerns or clinical changes.     Current HTN regimen:  Hypertension Medications             hydroCHLOROthiazide (MICROZIDE) 12.5 mg capsule TAKE 1 CAPSULE(S) BY MOUTH ONCE A DAY    irbesartan (AVAPRO) 150 MG tablet Take 0.5 tablets (75 mg total) by mouth once daily.    metoprolol tartrate (LOPRESSOR) 50 MG tablet TAKE 1 TABLET(S) BY MOUTH THREE TIMES DAILY        Diabetes Medications             metFORMIN (GLUCOPHAGE-XR) 500 MG 24 hr tablet TAKE 1 TABLET BY MOUTH EVERY DAY with breakfast

## 2018-05-28 ENCOUNTER — PATIENT OUTREACH (OUTPATIENT)
Dept: OTHER | Facility: OTHER | Age: 53
End: 2018-05-28

## 2018-05-28 NOTE — PROGRESS NOTES
Last 5 Patient Entered Readings                                      Current 30 Day Average: 119/78     Recent Readings 5/24/2018 5/24/2018 5/24/2018 5/24/2018 5/14/2018    SBP (mmHg) 134 134 140 140 116    DBP (mmHg) 89 89 92 92 76    Pulse 94 94 97 97 92          Last 6 Patient Entered Readings                                          Most Recent A1c: 6.5% (Goal: 7%)     Recent Readings 5/15/2018 5/1/2018 4/25/2018 4/20/2018 4/18/2018    Blood Glucose (mg/dL) 115 120 125 100 122        Digital Medicine: Health  Follow Up    Recently moved to [place] but not planning on staying long term.     Lifestyle Modifications:    1.Dietary Modifications:  Attributes higher readings to eating out more frequently due to the move.  Plans to start eating more at home this week.    2.Physical Activity: Joined a gym in [place] but hasn't gone yet. Hoping to start this weeks as she gets more settled into a routine.    3.Medication Therapy: Patient has been compliant with the medication regimen.    4.Patient has the following medication side effects/concerns: n/a  (Frequency/Alleviating factors/Precipitating factors, etc.)     Follow up with Mrs. Vijaya Pate Lan completed. No further questions or concerns. Will continue follow up to achieve health goals.

## 2018-05-29 ENCOUNTER — TELEPHONE (OUTPATIENT)
Dept: FAMILY MEDICINE | Facility: CLINIC | Age: 53
End: 2018-05-29

## 2018-05-29 DIAGNOSIS — E78.5 HYPERLIPIDEMIA LDL GOAL <100: ICD-10-CM

## 2018-05-29 DIAGNOSIS — R80.9 TYPE 2 DIABETES MELLITUS WITH PROTEINURIA: Primary | ICD-10-CM

## 2018-05-29 DIAGNOSIS — I10 ESSENTIAL HYPERTENSION: ICD-10-CM

## 2018-05-29 DIAGNOSIS — E11.29 TYPE 2 DIABETES MELLITUS WITH PROTEINURIA: Primary | ICD-10-CM

## 2018-05-29 NOTE — TELEPHONE ENCOUNTER
----- Message from Hermelinda Downey sent at 5/29/2018 11:31 AM CDT -----  Contact: Self  Pt calling to speak to a nurse regarding health and blood work. [phone].

## 2018-06-05 ENCOUNTER — TELEPHONE (OUTPATIENT)
Dept: FAMILY MEDICINE | Facility: CLINIC | Age: 53
End: 2018-06-05

## 2018-06-05 NOTE — TELEPHONE ENCOUNTER
----- Message from Nba De Souza sent at 6/5/2018  1:12 PM CDT -----  Contact: Self/[phone]  Refill:  metoprolol tartrate (LOPRESSOR) 50 MG tablet    Patient stated that she's currently in [place] and would like the staff to send her prescription to:    Pharmacy:     [facility]  [address] [place] [id number]   Fax:  [phone]    Thank you.

## 2018-06-06 ENCOUNTER — PATIENT OUTREACH (OUTPATIENT)
Dept: OTHER | Facility: OTHER | Age: 53
End: 2018-06-06

## 2018-06-06 NOTE — PROGRESS NOTES
Last 5 Patient Entered Readings                                      Current 30 Day Average: 129/82     Recent Readings 6/5/2018 6/5/2018 6/3/2018 6/3/2018 6/3/2018    SBP (mmHg) 126 126 141 141 152    DBP (mmHg) 71 71 92 92 92    Pulse 80 80 81 81 84          Last 6 Patient Entered Readings                                          Most Recent A1c: 6.5% (Goal: 7%)     Recent Readings 5/15/2018 5/1/2018 4/25/2018 4/20/2018 4/18/2018    Blood Glucose (mg/dL) 115 120 125 100 122        Digital Medicine: Health  Follow Up    Increased stress due to move. Trying to settle in. Attributes higher BP readings to stress.    Had concerns about glucometer and hasn't checked BG because she didn't have strips/lancets. Encouraged her to buy them on [employer] or [email or web address].    Lifestyle Modifications:    1.Dietary Modifications: trying to cook more at home and eat out less often.    2.Physical Activity: plans to increase activity    3.Medication Therapy: Patient has been compliant with the medication regimen.    4.Patient has the following medication side effects/concerns:   (Frequency/Alleviating factors/Precipitating factors, etc.)     Follow up with Mrs. Vijaya Pate Lan completed. No further questions or concerns. Will continue follow up to achieve health goals.

## 2018-06-07 ENCOUNTER — TELEPHONE (OUTPATIENT)
Dept: FAMILY MEDICINE | Facility: CLINIC | Age: 53
End: 2018-06-07

## 2018-06-07 NOTE — TELEPHONE ENCOUNTER
----- Message from Kelley Menchaca sent at 6/7/2018 12:00 PM CDT -----  Contact: self  Pt returned staff's call.   [phone].

## 2018-06-07 NOTE — TELEPHONE ENCOUNTER
----- Message from Gabriela Hall sent at 6/7/2018 10:05 AM CDT -----  Contact: self  [phone]  Pt is checking on the status on a refill request for Metoprolol, she said that she has been calling. [facility] send script to [facility] in [phone]. Pls call pt [phone]. Thanks.......Dominique

## 2018-06-07 NOTE — TELEPHONE ENCOUNTER
----- Message from Nba De Souza sent at 6/7/2018 10:37 AM CDT -----  Contact: Self/[phone]  Patient returned the staff's call. Thank you.

## 2018-06-19 DIAGNOSIS — E78.5 HYPERLIPIDEMIA WITH TARGET LDL LESS THAN 100: ICD-10-CM

## 2018-06-19 RX ORDER — PRAVASTATIN SODIUM 10 MG/1
10 TABLET ORAL DAILY
Qty: 90 TABLET | Refills: 0 | Status: SHIPPED | OUTPATIENT
Start: 2018-06-19 | End: 2018-08-13 | Stop reason: SDUPTHER

## 2018-06-19 NOTE — TELEPHONE ENCOUNTER
----- Message from Nba De Souza sent at 6/19/2018  2:00 PM CDT -----  Contact: Self/[phone]  Refill:  pravastatin (PRAVACHOL) 10 MG tablet    Patient stated that she's currently in [place] and would like the staff to send her prescription to:    Pharmacy:  [facility]                      [address] [place] [id number]                      Fax:  [phone]    Thank you.

## 2018-07-02 DIAGNOSIS — I10 ESSENTIAL HYPERTENSION: ICD-10-CM

## 2018-07-02 DIAGNOSIS — E11.9 DIABETES MELLITUS WITHOUT COMPLICATION: ICD-10-CM

## 2018-07-02 RX ORDER — METOPROLOL TARTRATE 50 MG/1
50 TABLET ORAL 3 TIMES DAILY
Qty: 90 TABLET | Refills: 2 | Status: SHIPPED | OUTPATIENT
Start: 2018-07-02 | End: 2018-08-13 | Stop reason: SDUPTHER

## 2018-07-02 RX ORDER — HYDROCHLOROTHIAZIDE 12.5 MG/1
CAPSULE ORAL
Qty: 30 CAPSULE | Refills: 2 | Status: SHIPPED | OUTPATIENT
Start: 2018-07-02 | End: 2018-08-13 | Stop reason: SDUPTHER

## 2018-07-02 RX ORDER — METFORMIN HYDROCHLORIDE 500 MG/1
500 TABLET, EXTENDED RELEASE ORAL DAILY
Qty: 30 TABLET | Refills: 2 | Status: SHIPPED | OUTPATIENT
Start: 2018-07-02 | End: 2018-08-13 | Stop reason: SDUPTHER

## 2018-07-02 NOTE — TELEPHONE ENCOUNTER
Spoke with Pt and confirmed the refill request was done correctly. No further concerns noted at this time.

## 2018-07-09 ENCOUNTER — PATIENT OUTREACH (OUTPATIENT)
Dept: OTHER | Facility: OTHER | Age: 53
End: 2018-07-09

## 2018-07-09 NOTE — PROGRESS NOTES
Last 5 Patient Entered Readings                                      Current 30 Day Average: 129/81     Recent Readings 7/2/2018 7/2/2018 6/29/2018 6/29/2018 6/22/2018    SBP (mmHg) 133 133 108 108 139    DBP (mmHg) 88 88 67 67 86    Pulse 71 71 77 77 63          Digital Medicine: Health  Follow Up    Left voicemail to follow up with Jacquie Vijaya Pate Lan.  Current BP average 129/81 mmHg, DBP is not at goal.

## 2018-07-23 NOTE — PROGRESS NOTES
Last 5 Patient Entered Readings                                      Current 30 Day Average: 133/85     Recent Readings 7/18/2018 7/18/2018 7/17/2018 7/17/2018 7/17/2018    SBP (mmHg) 152 152 140 140 149    DBP (mmHg) 93 93 92 92 96    Pulse 81 81 76 76 81          Digital Medicine: Health  Follow Up    Left voicemail to follow up with Ms. Vijaya Montenegro.  Current BP average 133/85 mmHg is not at goal.  Sending [employer] message.    Digital Medicine: Health  Follow Up    Mrs. Montenegro returned my call.   Thinks she might have had a panic attack, woke up at 4am. Checked BP and it was in a normal range but her pulse was 162. Took 1/2 xanax and was able to fall back asleep.    She's still off schedule and unsettled in [place]. Moving to [place] in Sept. Hoping it will improve her lifestyle.    Lifestyle Modifications:    1.Dietary Modifications: not on track    2.Physical Activity: not on track - spends most of her day sleeping, wakes up at noon. Will read and watch movies. Not comfortable going out at night.    3.Medication Therapy: Patient has been compliant with the medication regimen.    4.Patient has the following medication side effects/concerns:   (Frequency/Alleviating factors/Precipitating factors, etc.)     Follow up with Mrs. Vijaya Montenegro completed. No further questions or concerns. Will continue follow up to achieve health goals.

## 2018-07-31 ENCOUNTER — PATIENT OUTREACH (OUTPATIENT)
Dept: OTHER | Facility: OTHER | Age: 53
End: 2018-07-31

## 2018-07-31 NOTE — PROGRESS NOTES
Last 5 Patient Entered Readings                                      Current 30 Day Average: 137/89     Recent Readings 7/26/2018 7/26/2018 7/24/2018 7/24/2018 7/18/2018    SBP (mmHg) 123 123 137 137 152    DBP (mmHg) 84 84 88 88 93    Pulse 81 81 75 75 81        Mrs. Montenegro's BP readings have trended up. She feels it's related to being in [place] and away from home. She is home in [place] now for 3 weeks. Asked that she continue to check her BP regularly to determine if she requires a dose increase in irbesartan back to 150 mg QD. Ms. Montenegro also mentions that she is having panic attacks again, and uses 1/2 tablet of alprazolam when this happens. She will discuss this further with Dr. Hoover.     Patient's BP average is above goal of <130/80.     Will continue to monitor regularly. Will follow up in 3-4 weeks, sooner if BP begins to trend upward or downward.    Patient has my contact information and knows to call with any concerns or clinical changes.     Current HTN regimen:  Hypertension Medications             hydroCHLOROthiazide (MICROZIDE) 12.5 mg capsule TAKE 1 CAPSULE(S) BY MOUTH ONCE A DAY    irbesartan (AVAPRO) 150 MG tablet Take 0.5 tablets (75 mg total) by mouth once daily.    metoprolol tartrate (LOPRESSOR) 50 MG tablet Take 1 tablet (50 mg total) by mouth 3 (three) times daily.            Last 6 Patient Entered Readings                                          Most Recent A1c: 6.5% (Goal: 7%)     Recent Readings 5/15/2018 5/1/2018 4/25/2018 4/20/2018 4/18/2018    Blood Glucose (mg/dL) 115 120 125 100 122        Mrs. Montenegro forgot her BG meter at home while she was in [place]. She is back home now so she will begin checking her BG again.     Patient has my contact information and knows to call with any concerns or clinical changes.     Diabetes Medications             metFORMIN (GLUCOPHAGE-XR) 500 MG 24 hr tablet Take 1 tablet (500 mg total) by mouth once daily.

## 2018-08-13 ENCOUNTER — LAB VISIT (OUTPATIENT)
Dept: LAB | Facility: HOSPITAL | Age: 53
End: 2018-08-13
Attending: INTERNAL MEDICINE
Payer: COMMERCIAL

## 2018-08-13 ENCOUNTER — OFFICE VISIT (OUTPATIENT)
Dept: FAMILY MEDICINE | Facility: CLINIC | Age: 53
End: 2018-08-13
Payer: COMMERCIAL

## 2018-08-13 ENCOUNTER — TELEPHONE (OUTPATIENT)
Dept: FAMILY MEDICINE | Facility: CLINIC | Age: 53
End: 2018-08-13

## 2018-08-13 VITALS
OXYGEN SATURATION: 98 % | TEMPERATURE: 98 F | SYSTOLIC BLOOD PRESSURE: 132 MMHG | DIASTOLIC BLOOD PRESSURE: 86 MMHG | HEIGHT: 61 IN | BODY MASS INDEX: 31.45 KG/M2 | HEART RATE: 67 BPM | WEIGHT: 166.56 LBS

## 2018-08-13 DIAGNOSIS — I10 ESSENTIAL HYPERTENSION: ICD-10-CM

## 2018-08-13 DIAGNOSIS — R80.9 TYPE 2 DIABETES MELLITUS WITH PROTEINURIA: ICD-10-CM

## 2018-08-13 DIAGNOSIS — K21.9 GASTROESOPHAGEAL REFLUX DISEASE, ESOPHAGITIS PRESENCE NOT SPECIFIED: ICD-10-CM

## 2018-08-13 DIAGNOSIS — E11.29 TYPE 2 DIABETES MELLITUS WITH PROTEINURIA: ICD-10-CM

## 2018-08-13 DIAGNOSIS — Z23 FLU VACCINE NEED: ICD-10-CM

## 2018-08-13 DIAGNOSIS — E78.5 HYPERLIPIDEMIA LDL GOAL <100: ICD-10-CM

## 2018-08-13 DIAGNOSIS — F41.9 ANXIETY: ICD-10-CM

## 2018-08-13 DIAGNOSIS — E78.5 HYPERLIPIDEMIA WITH TARGET LDL LESS THAN 100: ICD-10-CM

## 2018-08-13 DIAGNOSIS — E66.9 OBESITY (BMI 30-39.9): ICD-10-CM

## 2018-08-13 DIAGNOSIS — Z00.00 ROUTINE MEDICAL EXAM: Primary | ICD-10-CM

## 2018-08-13 LAB
ALBUMIN SERPL BCP-MCNC: 3.4 G/DL
ALP SERPL-CCNC: 82 U/L
ALT SERPL W/O P-5'-P-CCNC: 13 U/L
ANION GAP SERPL CALC-SCNC: 11 MMOL/L
AST SERPL-CCNC: 19 U/L
BASOPHILS # BLD AUTO: 0.04 K/UL
BASOPHILS NFR BLD: 0.4 %
BILIRUB SERPL-MCNC: 0.5 MG/DL
BUN SERPL-MCNC: 14 MG/DL
CALCIUM SERPL-MCNC: 10 MG/DL
CHLORIDE SERPL-SCNC: 104 MMOL/L
CHOLEST SERPL-MCNC: 190 MG/DL
CHOLEST/HDLC SERPL: 3.9 {RATIO}
CO2 SERPL-SCNC: 23 MMOL/L
CREAT SERPL-MCNC: 0.9 MG/DL
DIFFERENTIAL METHOD: ABNORMAL
EOSINOPHIL # BLD AUTO: 0.2 K/UL
EOSINOPHIL NFR BLD: 2 %
ERYTHROCYTE [DISTWIDTH] IN BLOOD BY AUTOMATED COUNT: 13.1 %
EST. GFR  (AFRICAN AMERICAN): >60 ML/MIN/1.73 M^2
EST. GFR  (NON AFRICAN AMERICAN): >60 ML/MIN/1.73 M^2
ESTIMATED AVG GLUCOSE: 146 MG/DL
GLUCOSE SERPL-MCNC: 135 MG/DL
HBA1C MFR BLD HPLC: 6.7 %
HCT VFR BLD AUTO: 42.6 %
HDLC SERPL-MCNC: 49 MG/DL
HDLC SERPL: 25.8 %
HGB BLD-MCNC: 13.6 G/DL
IMM GRANULOCYTES # BLD AUTO: 0.04 K/UL
IMM GRANULOCYTES NFR BLD AUTO: 0.4 %
LDLC SERPL CALC-MCNC: 112.8 MG/DL
LYMPHOCYTES # BLD AUTO: 4.1 K/UL
LYMPHOCYTES NFR BLD: 41.8 %
MCH RBC QN AUTO: 30.7 PG
MCHC RBC AUTO-ENTMCNC: 31.9 G/DL
MCV RBC AUTO: 96 FL
MONOCYTES # BLD AUTO: 0.7 K/UL
MONOCYTES NFR BLD: 7.6 %
NEUTROPHILS # BLD AUTO: 4.7 K/UL
NEUTROPHILS NFR BLD: 47.8 %
NONHDLC SERPL-MCNC: 141 MG/DL
NRBC BLD-RTO: 0 /100 WBC
PLATELET # BLD AUTO: 324 K/UL
PMV BLD AUTO: 10.8 FL
POTASSIUM SERPL-SCNC: 4.7 MMOL/L
PROT SERPL-MCNC: 7.3 G/DL
RBC # BLD AUTO: 4.43 M/UL
SODIUM SERPL-SCNC: 138 MMOL/L
TRIGL SERPL-MCNC: 141 MG/DL
WBC # BLD AUTO: 9.73 K/UL

## 2018-08-13 PROCEDURE — 99999 PR PBB SHADOW E&M-EST. PATIENT-LVL III: CPT | Mod: PBBFAC,,, | Performed by: INTERNAL MEDICINE

## 2018-08-13 PROCEDURE — 85025 COMPLETE CBC W/AUTO DIFF WBC: CPT

## 2018-08-13 PROCEDURE — 99396 PREV VISIT EST AGE 40-64: CPT | Mod: S$GLB,,, | Performed by: INTERNAL MEDICINE

## 2018-08-13 PROCEDURE — 3079F DIAST BP 80-89 MM HG: CPT | Mod: CPTII,S$GLB,, | Performed by: INTERNAL MEDICINE

## 2018-08-13 PROCEDURE — 3044F HG A1C LEVEL LT 7.0%: CPT | Mod: CPTII,S$GLB,, | Performed by: INTERNAL MEDICINE

## 2018-08-13 PROCEDURE — 80053 COMPREHEN METABOLIC PANEL: CPT

## 2018-08-13 PROCEDURE — 80061 LIPID PANEL: CPT

## 2018-08-13 PROCEDURE — 83036 HEMOGLOBIN GLYCOSYLATED A1C: CPT

## 2018-08-13 PROCEDURE — 36415 COLL VENOUS BLD VENIPUNCTURE: CPT | Mod: PO

## 2018-08-13 PROCEDURE — 3075F SYST BP GE 130 - 139MM HG: CPT | Mod: CPTII,S$GLB,, | Performed by: INTERNAL MEDICINE

## 2018-08-13 RX ORDER — PRAVASTATIN SODIUM 20 MG/1
20 TABLET ORAL DAILY
Qty: 90 TABLET | Refills: 1 | Status: SHIPPED | OUTPATIENT
Start: 2018-08-13 | End: 2019-02-25 | Stop reason: SDUPTHER

## 2018-08-13 RX ORDER — HYDROCHLOROTHIAZIDE 12.5 MG/1
CAPSULE ORAL
Qty: 90 CAPSULE | Refills: 1 | Status: SHIPPED | OUTPATIENT
Start: 2018-08-13 | End: 2018-11-20 | Stop reason: DRUGHIGH

## 2018-08-13 RX ORDER — METOPROLOL TARTRATE 50 MG/1
50 TABLET ORAL 3 TIMES DAILY
Qty: 270 TABLET | Refills: 1 | Status: SHIPPED | OUTPATIENT
Start: 2018-08-13 | End: 2019-03-26 | Stop reason: SDUPTHER

## 2018-08-13 RX ORDER — PRAVASTATIN SODIUM 10 MG/1
10 TABLET ORAL DAILY
Qty: 90 TABLET | Refills: 1 | Status: SHIPPED | OUTPATIENT
Start: 2018-08-13 | End: 2018-08-13

## 2018-08-13 RX ORDER — ALPRAZOLAM 0.25 MG/1
0.25 TABLET ORAL DAILY PRN
Qty: 30 TABLET | Refills: 0 | Status: SHIPPED | OUTPATIENT
Start: 2018-08-13 | End: 2018-11-29 | Stop reason: SDUPTHER

## 2018-08-13 RX ORDER — IRBESARTAN 150 MG/1
75 TABLET ORAL DAILY
Qty: 45 TABLET | Refills: 1 | Status: SHIPPED | OUTPATIENT
Start: 2018-08-13 | End: 2018-10-24 | Stop reason: SDUPTHER

## 2018-08-13 RX ORDER — METFORMIN HYDROCHLORIDE 500 MG/1
500 TABLET, EXTENDED RELEASE ORAL DAILY
Qty: 90 TABLET | Refills: 1 | Status: SHIPPED | OUTPATIENT
Start: 2018-08-13 | End: 2018-09-28 | Stop reason: SDUPTHER

## 2018-08-13 NOTE — TELEPHONE ENCOUNTER
Please call patient: all lab results look ok, except cholesterol no longer at goal (LDL should be <100). Will increase pravastatin to 20mg daily. Recheck in 6 months.

## 2018-08-13 NOTE — PROGRESS NOTES
HISTORY OF PRESENT ILLNESS:  Vijaya Montenegro is a 52 y.o. female who presents to the clinic today for a routine medical physical exam. Her last physical exam was approximately 1 years(s) ago.    Contraception: no method      PAST MEDICAL HISTORY:  Past Medical History:   Diagnosis Date    Anxiety     GERD (gastroesophageal reflux disease)     Gout, arthritis     Hyperlipidemia LDL goal < 100     Hypertension     Obesity     Type II or unspecified type diabetes mellitus without mention of complication, not stated as uncontrolled     diet controlled       PAST SURGICAL HISTORY:  Past Surgical History:   Procedure Laterality Date     SECTION, LOW TRANSVERSE      x2    REFRACTIVE SURGERY Bilateral     [facility]       SOCIAL HISTORY:  Social History     Socioeconomic History    Marital status:      Spouse name: Not on file    Number of children: 2    Years of education: Not on file    Highest education level: Not on file   Social Needs    Financial resource strain: Not on file    Food insecurity - worry: Not on file    Food insecurity - inability: Not on file    Transportation needs - medical: Not on file    Transportation needs - non-medical: Not on file   Occupational History    Not on file   Tobacco Use    Smoking status: Never Smoker    Smokeless tobacco: Never Used   Substance and Sexual Activity    Alcohol use: Yes    Drug use: No    Sexual activity: Yes     Partners: Male   Other Topics Concern    Not on file   Social History Narrative    Not on file       FAMILY HISTORY:  Family History   Problem Relation Age of Onset    Hypertension Father     Bladder Cancer Father     Cancer Father         bladder    Diabetes Sister     Heart attack Maternal Grandmother     Cataracts Maternal Grandmother     COPD Mother     Blindness Mother         left eye; eye injury    Thyroid disease Mother     No Known Problems Maternal Grandfather     Diabetes  Paternal Grandmother     No Known Problems Paternal Grandfather     Diabetes Paternal Aunt     Diabetes Paternal Aunt     No Known Problems Maternal Aunt     No Known Problems Maternal Uncle     No Known Problems Paternal Uncle     Colon cancer Neg Hx     Amblyopia Neg Hx     Glaucoma Neg Hx     Macular degeneration Neg Hx     Retinal detachment Neg Hx     Strabismus Neg Hx     Stroke Neg Hx        ALLERGIES AND MEDICATIONS: updated and reviewed.  Review of patient's allergies indicates:   Allergen Reactions    Codeine Other (See Comments)     Medication List with Changes/Refills   Current Medications    BLOOD SUGAR DIAGNOSTIC STRP    Test one time daily.    LANCETS 31 GAUGE MISC    1 lancet by Misc.(Non-Drug; Combo Route) route 2 (two) times daily. Test BID    PANTOPRAZOLE (PROTONIX) 40 MG TABLET    Take 1 tablet (40 mg total) by mouth daily as needed (heartburn). Take only as needed.    PREVIFEM 0.25-35 MG-MCG PER TABLET    Take 1 tablet by mouth once daily.   Changed and/or Refilled Medications    Modified Medication Previous Medication    ALPRAZOLAM (XANAX) 0.25 MG TABLET ALPRAZolam (XANAX) 0.25 MG tablet       Take 1 tablet (0.25 mg total) by mouth daily as needed for Anxiety.    Take 1 tablet (0.25 mg total) by mouth daily as needed for Anxiety.    HYDROCHLOROTHIAZIDE (MICROZIDE) 12.5 MG CAPSULE hydroCHLOROthiazide (MICROZIDE) 12.5 mg capsule       TAKE 1 CAPSULE(S) BY MOUTH ONCE A DAY    TAKE 1 CAPSULE(S) BY MOUTH ONCE A DAY    IRBESARTAN (AVAPRO) 150 MG TABLET irbesartan (AVAPRO) 150 MG tablet       Take 0.5 tablets (75 mg total) by mouth once daily.    Take 0.5 tablets (75 mg total) by mouth once daily.    METFORMIN (GLUCOPHAGE-XR) 500 MG 24 HR TABLET metFORMIN (GLUCOPHAGE-XR) 500 MG 24 hr tablet       Take 1 tablet (500 mg total) by mouth once daily.    Take 1 tablet (500 mg total) by mouth once daily.    METOPROLOL TARTRATE (LOPRESSOR) 50 MG TABLET metoprolol tartrate (LOPRESSOR) 50 MG  tablet       Take 1 tablet (50 mg total) by mouth 3 (three) times daily.    Take 1 tablet (50 mg total) by mouth 3 (three) times daily.    PRAVASTATIN (PRAVACHOL) 10 MG TABLET pravastatin (PRAVACHOL) 10 MG tablet       Take 1 tablet (10 mg total) by mouth once daily.    Take 1 tablet (10 mg total) by mouth once daily.         CARE TEAM:  Patient Care Team:  Lanette Hoover MD as PCP - General (Internal Medicine)  Meghann Herrera as Digital Medicine Health   Lanette Hoover MD as Hypertension Digital Medicine Responsible Provider (Internal Medicine)  Karley Campbell, PharmD as Hypertension Digital Medicine Clinician (Pharmacist)  Karley Campbell, PharmD as Diabetes Digital Medicine Clinician (Pharmacist)  Lanette Hoover MD as Diabetes Digital Medicine Responsible Provider (Internal Medicine)           SCREENING HISTORY:  Health Maintenance       Date Due Completion Date    Lipid Panel 03/06/2018 3/6/2017    Override on 11/10/2014: Done (Total cholesterol 205, HDL 52, triglycerides 252, and )    Foot Exam 03/10/2018 3/10/2017    Urine Microalbumin 03/10/2018 3/10/2017    Influenza Vaccine 08/01/2018 12/13/2017 (Declined)    Override on 12/13/2017: Declined    Override on 12/16/2015: Done    Override on 10/17/2014: Done (at pharmacy)    Override on 10/1/2013: Done    Hemoglobin A1c 10/05/2018 4/5/2018    Override on 11/10/2014: Done ([facility] - 6.6)    Eye Exam 12/13/2018 12/13/2017 (Done)    Override on 12/13/2017: Done    Override on 8/15/2016: Done    Override on 6/10/2014: Done (normal per patient)    Mammogram 03/06/2019 3/6/2017    Override on 2/27/2017: Done (done at [facility])    Override on 2/1/2014: Done    Override on 10/3/2011: Done    Override on 12/5/2006: Done    Low Dose Statin 06/19/2019 6/19/2018    Pap Smear with HPV Cotest 01/23/2020 1/23/2017    Override on 1/19/2015: Done ([employer] Claims)    Override on 10/1/2012: Done    Override on 11/16/2006: Done    TETANUS VACCINE 07/05/2023  "7/5/2013    Colonoscopy 05/17/2027 5/17/2017 (Done)    Override on 5/17/2017: Done (Dr. Fernando Ferreira/[facility]- grade 1 internal hemorrhoids)    Override on 3/10/2017: Not Clinically Appropriate (pt will do next month)    Pneumococcal PPSV23 (Medium Risk) (2) 11/03/2031 8/14/2013            REVIEW OF SYSTEMS:   The patient reports fair dietary habits. She has been eating out more since she is not working.  The patient does not exercise regularly. She did recently join a gym.  Review of Systems   Constitutional: Negative for chills, fatigue, fever and unexpected weight change.   HENT: Negative for congestion, ear discharge, ear pain and postnasal drip.    Eyes: Negative for photophobia, pain and visual disturbance.   Respiratory: Negative for cough, shortness of breath and wheezing.    Cardiovascular: Negative for chest pain, palpitations and leg swelling.   Gastrointestinal: Negative for abdominal pain, constipation, diarrhea, nausea and vomiting.        GERD symptoms less in [place]   Genitourinary: Negative for dysuria, frequency, urgency and vaginal discharge.   Musculoskeletal: Negative for back pain, joint swelling and neck stiffness.   Skin: Negative for rash.   Neurological: Negative for weakness and headaches.   Psychiatric/Behavioral: Negative for dysphoric mood and sleep disturbance. The patient is nervous/anxious (- anxiety worse while living in [place] right now).      negative for - pelvic pain  Breast ROS: negative for breast lumps        Physical Examination:   Vitals:    08/13/18 0726   BP: 132/86   Pulse: 67   Temp: 97.9 °F (36.6 °C)     Weight: 75.5 kg (166 lb 8.9 oz)   Height: 5' 1" (154.9 cm)   Body mass index is 31.47 kg/m².      Patient did not require to have a chaperone present during the exam today.  General appearance - alert, well appearing, and in no distress and obese  Mental status - alert, oriented to person, place, and time, normal mood, behavior, speech, dress, motor activity, and " thought processes  Eyes - pupils equal and reactive, extraocular eye movements intact, sclera anicteric  Mouth - mucous membranes moist, pharynx normal without lesions  Neck - supple, no significant adenopathy, carotids upstroke normal bilaterally, no bruits, thyroid exam: thyroid is normal in size without nodules or tenderness  Lymphatics - no palpable lymphadenopathy  Chest - clear to auscultation, no wheezes, rales or rhonchi, symmetric air entry  Heart - normal rate and regular rhythm, no gallops noted  Abdomen - soft, nontender, nondistended, no masses or organomegaly  Breasts - not examined  Back exam - full range of motion, no tenderness, palpable spasm or pain on motion  Neurological - alert, oriented, normal speech, no focal findings or movement disorder noted, cranial nerves II through XII intact  Musculoskeletal - no joint tenderness, deformity or swelling, no muscular tenderness noted  Extremities - peripheral pulses normal, no pedal edema, no clubbing or cyanosis  Skin - normal coloration and turgor, no rashes, no suspicious skin lesions noted      Protective Sensation (w/ 10 gram monofilament):  Right: Intact  Left: Intact    Visual Inspection:  Normal -  Bilateral    Pedal Pulses:   Right: Present  Left: Present    Posterior tibialis:   Right:Present  Left: Present         ASSESSMENT AND PLAN:  1. Routine medical exam  Counseled on age appropriate medical preventative services including age appropriate cancer screenings, age appropriate eye and dental exams, over all nutritional health, need for a consistent exercise regimen, and an over all push towards maintaining a vigorous and active lifestyle.  Counseled on age appropriate vaccines and discussed upcoming health care needs based on age/gender. Discussed good sleep hygiene and stress management.     2. Type 2 diabetes mellitus with proteinuria  Diabetes currently is controlled for age and comorbid conditions. We discussed diabetic diet and regular  exercise. We discussed home blood sugar monitoring, if appropriate. The current medical regimen is effective;  continue present plan and medications.    - metFORMIN (GLUCOPHAGE-XR) 500 MG 24 hr tablet; Take 1 tablet (500 mg total) by mouth once daily.  Dispense: 90 tablet; Refill: 1  - Microalbumin/creatinine urine ratio; Future    3. Essential hypertension  Discussed sodium restriction, maintaining ideal body weight and regular exercise program as physiologic means to achieve blood pressure control. The patient will strive towards this. The current medical regimen is effective;  continue present plan and medications. Recommended patient to check home readings to monitor and see me for followup as scheduled or sooner as needed. Patient was educated that both decongestant and anti-inflammatory medication may raise blood pressure.   - hydroCHLOROthiazide (MICROZIDE) 12.5 mg capsule; TAKE 1 CAPSULE(S) BY MOUTH ONCE A DAY  Dispense: 90 capsule; Refill: 1  - irbesartan (AVAPRO) 150 MG tablet; Take 0.5 tablets (75 mg total) by mouth once daily.  Dispense: 45 tablet; Refill: 1  - metoprolol tartrate (LOPRESSOR) 50 MG tablet; Take 1 tablet (50 mg total) by mouth 3 (three) times daily.  Dispense: 270 tablet; Refill: 1    4. Hyperlipidemia with target LDL less than 100  We discussed low fat diet and regular exercise.The current medical regimen is effective;  continue present plan and medications.    - pravastatin (PRAVACHOL) 10 MG tablet; Take 1 tablet (10 mg total) by mouth once daily.  Dispense: 90 tablet; Refill: 1    5. Anxiety  Stable. Medication as needed.   - ALPRAZolam (XANAX) 0.25 MG tablet; Take 1 tablet (0.25 mg total) by mouth daily as needed for Anxiety.  Dispense: 30 tablet; Refill: 0    6. Gastroesophageal reflux disease, esophagitis presence not specified  Symptoms controlled: yes. Reflux precautions discussed (eliminate tobacco if a smoker; minimize caffeine, chocolate and red/white peppermint intake; avoid  heavy and spicy meals; don't lay down within 2-3 hours after eating; minimize the intake of NSAIDs). Medication as needed. Patient asked to take medication breaks, if possible - discussed chronic use can limit calcium absorption (which can lead to osteopenia/osteoporosis), increases the risk for intestinal infections, and can cause kidney damage. There are also some newer studies that show possible increased risk of mortality.     7. Obesity (BMI 30-39.9)  The patient is asked to make an attempt to improve diet and exercise patterns to aid in medical management of this problem.     8. Flu vaccine need  Will get later this fall.           Follow-up in about 6 months (around 2/13/2019), or if symptoms worsen or fail to improve, for follow up chronic medical conditions.. or sooner as needed.

## 2018-08-28 ENCOUNTER — PATIENT OUTREACH (OUTPATIENT)
Dept: OTHER | Facility: OTHER | Age: 53
End: 2018-08-28

## 2018-08-28 NOTE — PROGRESS NOTES
Last 5 Patient Entered Readings                                      Current 30 Day Average: 122/81     Recent Readings 8/27/2018 8/27/2018 8/26/2018 8/26/2018 8/24/2018    SBP (mmHg) 135 135 125 125 125    DBP (mmHg) 90 90 87 87 81    Pulse 86 86 74 74 79        Digital Medicine: Health  Follow Up    Still feeling unsettled in [place]. Not taking xanax as often though.     Lifestyle Modifications:    1.Dietary Modifications: Reading labels, working on lowering cholesterol. Didn't realize how much sodium is in pickles. Eating more fruit. Sending resources on sodium and sugar intake. Stated she will also go to her local library to read up on healthy diets.    2.Physical Activity: Plans to start walking around a track near her house for more exercise.    3.Medication Therapy: Patient has been compliant with the medication regimen.    4.Patient has the following medication side effects/concerns:   (Frequency/Alleviating factors/Precipitating factors, etc.)     Follow up with Mrs. Pately Herlinda Montenegro completed. No further questions or concerns. Will continue follow up to achieve health goals.

## 2018-09-17 ENCOUNTER — PATIENT OUTREACH (OUTPATIENT)
Dept: OTHER | Facility: OTHER | Age: 53
End: 2018-09-17

## 2018-09-17 NOTE — PROGRESS NOTES
Last 5 Patient Entered Readings                                      Current 30 Day Average: 131/85     Recent Readings 9/16/2018 9/16/2018 9/6/2018 9/6/2018 8/27/2018    SBP (mmHg) 137 137 132 132 135    DBP (mmHg) 84 84 81 81 90    Pulse 71 71 67 67 86        Ms. Blounts BP average has improved since last encounter, but it remains above goal. She has not gone back to [place], but will likely go back next Monday. She reports not exercising and not monitoring salt intake as much as she used to. Encouraged her to return to walking regularly and monitor the salt in her meals as she cooks at home mostly.     Will continue to monitor regularly. Will follow up in 4-6 weeks, sooner if BP begins to trend upward or downward.    Patient has my contact information and knows to call with any concerns or clinical changes.     Current HTN regimen:  Hypertension Medications             hydroCHLOROthiazide (MICROZIDE) 12.5 mg capsule TAKE 1 CAPSULE(S) BY MOUTH ONCE A DAY    irbesartan (AVAPRO) 150 MG tablet Take 0.5 tablets (75 mg total) by mouth once daily.    metoprolol tartrate (LOPRESSOR) 50 MG tablet Take 1 tablet (50 mg total) by mouth 3 (three) times daily.            Last 6 Patient Entered Readings                                          Most Recent A1c: 6.7% on 8/13/2018  (Goal: 7%)     Recent Readings 5/15/2018 5/1/2018 4/25/2018 4/20/2018 4/18/2018    Blood Glucose (mg/dL) 115 120 125 100 122        Ms. Montenegro has not checked her BG since May because she left her glucometer in [place]. Her  will bring it with him when he comes home for the weekend.    I will continue to monitor regularly and will follow up in 1-2 months, sooner if there are any concerning blood glucose readings. .    Patient has my contact information and knows to call with any concerns or clinical changes.     Diabetes Medications             metFORMIN (GLUCOPHAGE-XR) 500 MG 24 hr tablet Take 1 tablet (500 mg total) by mouth once daily.

## 2018-09-25 ENCOUNTER — PATIENT OUTREACH (OUTPATIENT)
Dept: OTHER | Facility: OTHER | Age: 53
End: 2018-09-25

## 2018-09-25 DIAGNOSIS — E11.29 TYPE 2 DIABETES MELLITUS WITH PROTEINURIA: ICD-10-CM

## 2018-09-25 DIAGNOSIS — R80.9 TYPE 2 DIABETES MELLITUS WITH PROTEINURIA: ICD-10-CM

## 2018-09-25 NOTE — TELEPHONE ENCOUNTER
----- Message from Nba De Souza sent at 9/25/2018  1:18 PM CDT -----  Contact: Self/[phone]  Refill:  metFORMIN (GLUCOPHAGE-XR) 500 MG 24 hr tablet      [facility] [address] [place] [id number]  Phone: [phone] Fax: [phone]    Thank you.

## 2018-09-25 NOTE — PROGRESS NOTES
Last 5 Patient Entered Readings                                      Current 30 Day Average: 132/86     Recent Readings 9/24/2018 9/24/2018 9/23/2018 9/23/2018 9/18/2018    SBP (mmHg) 131 131 135 135 130    DBP (mmHg) 86 86 85 85 86    Pulse 64 64 68 68 82        Digital Medicine: Health  Follow Up    Feeling well.  Stress - she is back at home and  is still in [place]. Attributes stress to higher readings.    Lifestyle Modifications:    1.Dietary Modifications: Cooking most meals at home. Using the no-salt [employer] for seasoning.    2.Physical Activity: Has not been active since returning home. Encouraged her to start her walking routine again.    3.Medication Therapy: Patient has been compliant with the medication regimen.    4.Patient has the following medication side effects/concerns:   (Frequency/Alleviating factors/Precipitating factors, etc.)     Follow up with Mrs. Vijaya Henriquezmary Montenegro completed. No further questions or concerns. Will continue to follow up to achieve health goals.

## 2018-09-28 ENCOUNTER — TELEPHONE (OUTPATIENT)
Dept: FAMILY MEDICINE | Facility: CLINIC | Age: 53
End: 2018-09-28

## 2018-09-28 RX ORDER — METFORMIN HYDROCHLORIDE 500 MG/1
500 TABLET, EXTENDED RELEASE ORAL DAILY
Qty: 90 TABLET | Refills: 1 | Status: SHIPPED | OUTPATIENT
Start: 2018-09-28 | End: 2019-03-26 | Stop reason: SDUPTHER

## 2018-09-28 NOTE — TELEPHONE ENCOUNTER
----- Message from Tiffanie Heard sent at 9/28/2018 12:15 PM CDT -----  Contact: Self/ [phone]  Pt calling to follow up on refill request for Metformin. Please call with status. Thank you.    [facility] [address] [place] [id number]  Phone: [phone] Fax: [phone]

## 2018-10-22 ENCOUNTER — PATIENT OUTREACH (OUTPATIENT)
Dept: OTHER | Facility: OTHER | Age: 53
End: 2018-10-22

## 2018-10-22 DIAGNOSIS — R80.9 TYPE 2 DIABETES MELLITUS WITH PROTEINURIA: Primary | ICD-10-CM

## 2018-10-22 DIAGNOSIS — I10 ESSENTIAL HYPERTENSION: ICD-10-CM

## 2018-10-22 DIAGNOSIS — E11.29 TYPE 2 DIABETES MELLITUS WITH PROTEINURIA: Primary | ICD-10-CM

## 2018-10-22 NOTE — PROGRESS NOTES
Last 5 Patient Entered Readings                                      Current 30 Day Average: 141/89     Recent Readings 10/22/2018 10/22/2018 10/18/2018 10/18/2018 10/17/2018    SBP (mmHg) 146 146 127 127 139    DBP (mmHg) 89 89 82 82 87    Pulse 77 77 93 93 84        Ms. Montenegro's BP readings have been higher lately. She is unsure of the cause of her higher readings. She has not been dining out much and has been trying to focus on eating more healthfully. She is concerned about her higher BP, so will increase irbesartan to 150 mg QD.     Patient's BP average is above goal of <130/80.     Will continue to monitor regularly. Will follow up in 2-3 weeks, sooner if BP begins to trend upward or downward.    Patient has my contact information and knows to call with any concerns or clinical changes.     Current HTN regimen:  Hypertension Medications             hydroCHLOROthiazide (MICROZIDE) 12.5 mg capsule TAKE 1 CAPSULE(S) BY MOUTH ONCE A DAY    irbesartan (AVAPRO) 150 MG tablet Take 1 tablet (150 mg total) by mouth once daily.    metoprolol tartrate (LOPRESSOR) 50 MG tablet Take 1 tablet (50 mg total) by mouth 3 (three) times daily.          Last 6 Patient Entered Readings                                          Most Recent A1c: 6.7% on 8/13/2018  (Goal: 7%)     Recent Readings 5/15/2018 5/1/2018 4/25/2018 4/20/2018 4/18/2018    Blood Glucose (mg/dL) 115 120 125 100 122        Ms. Montenegro is unable to get her glucometer to work. She will bring it to the [facility] on Monday for assistance.

## 2018-10-24 RX ORDER — IRBESARTAN 150 MG/1
150 TABLET ORAL DAILY
Qty: 90 TABLET | Refills: 1 | Status: SHIPPED | OUTPATIENT
Start: 2018-10-24 | End: 2019-04-10 | Stop reason: SDUPTHER

## 2018-10-29 ENCOUNTER — PATIENT OUTREACH (OUTPATIENT)
Dept: OTHER | Facility: OTHER | Age: 53
End: 2018-10-29

## 2018-10-29 NOTE — PROGRESS NOTES
Last 5 Patient Entered Readings                                      Current 30 Day Average: 141/89     Recent Readings 10/22/2018 10/22/2018 10/18/2018 10/18/2018 10/17/2018    SBP (mmHg) 146 146 127 127 139    DBP (mmHg) 89 89 82 82 87    Pulse 77 77 93 93 84        Digital Medicine: Health  Follow Up    Feeling well.  Sometimes will take 1/2 xanax but not daily.    Working with Svitlana Crandall on med change. Encouraged her to check BP more frequently.    Still needs to go to the [facility] to get glucometer to work. Can't find old glucometer, might use her mom's to check a couple times just to make sure BG is still within range.    Lifestyle Modifications:    1.Dietary Modifications: snacks: low sodium pringles, berries, continuing to eat pickles but trying to be more mindful about how many. Encouraged low sodium diet again.    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    4.Patient has the following medication side effects/concerns:   (Frequency/Alleviating factors/Precipitating factors, etc.)     Follow up with Mrs. Lilly Herlinda Montenegro completed. No further questions or concerns. Will continue to follow up to achieve health goals.

## 2018-11-07 ENCOUNTER — PATIENT OUTREACH (OUTPATIENT)
Dept: OTHER | Facility: OTHER | Age: 53
End: 2018-11-07

## 2018-11-07 DIAGNOSIS — I10 ESSENTIAL HYPERTENSION: ICD-10-CM

## 2018-11-07 NOTE — PROGRESS NOTES
Last 5 Patient Entered Readings                                      Current 30 Day Average: 130/86     Recent Readings 11/19/2018 11/19/2018 11/17/2018 11/17/2018 11/15/2018    SBP (mmHg) 137 137 129 129 133    DBP (mmHg) 85 85 80 80 83    Pulse 85 85 79 79 67        Mrs. Montenegro's BP average is improving on higher dose of irbesartan, however DBP average remains above goal. Will increase HCTZ to 25 mg QD. Will check BMP in 1-2 weeks.     Will continue to monitor regularly. Will follow up in 2-3 weeks, sooner if BP begins to trend upward or downward.    Patient has my contact information and knows to call with any concerns or clinical changes.     Current HTN regimen:  Hypertension Medications             hydroCHLOROthiazide (HYDRODIURIL) 25 MG tablet Take 1 tablet (25 mg total) by mouth once daily.    irbesartan (AVAPRO) 150 MG tablet Take 1 tablet (150 mg total) by mouth once daily.    metoprolol tartrate (LOPRESSOR) 50 MG tablet Take 1 tablet (50 mg total) by mouth 3 (three) times daily.        Last 6 Patient Entered Readings                                          Most Recent A1c: 6.7% on 8/13/2018  (Goal: 7%)     Recent Readings 5/15/2018 5/1/2018 4/25/2018 4/20/2018 4/18/2018    Blood Glucose (mg/dL) 115 120 125 100 122        No updates on DM as she needs a new BG monitor.

## 2018-11-20 RX ORDER — HYDROCHLOROTHIAZIDE 25 MG/1
25 TABLET ORAL DAILY
Qty: 30 TABLET | Refills: 3 | Status: SHIPPED | OUTPATIENT
Start: 2018-11-20 | End: 2019-03-26 | Stop reason: SDUPTHER

## 2018-11-26 ENCOUNTER — PATIENT OUTREACH (OUTPATIENT)
Dept: OTHER | Facility: OTHER | Age: 53
End: 2018-11-26

## 2018-11-27 ENCOUNTER — PATIENT OUTREACH (OUTPATIENT)
Dept: OTHER | Facility: OTHER | Age: 53
End: 2018-11-27

## 2018-11-27 NOTE — PROGRESS NOTES
Last 5 Patient Entered Readings                                      Current 30 Day Average: 131/86     Recent Readings 11/26/2018 11/26/2018 11/25/2018 11/25/2018 11/24/2018    SBP (mmHg) 139 139 141 141 123    DBP (mmHg) 87 87 92 92 78    Pulse 74 74 86 86 91        Digital Medicine: Health  Follow Up    Mrs. Montenegro reached out to discuss diet in more detail.    She is starting to ask for help more with taking care of family. Feels that a lot of responsibility falls on her shoulders and her stress level is generally elevated. She finally asked her daughter to help take care of her mother, who is blind. This has helped alleviate some stress and free up time for herself.    They will be moving to [place] soon for her 's job. She is nervous about moving again.    Lifestyle Modifications:    1.Dietary Modifications: Would like info on a diabetes specific diet. Salads - cheese, ham / trying to cut back on pickles.    2.Physical Activity: cleaning houses    Follow up with Mrs. Vijaya Montenegro completed. No further questions or concerns. Will continue to follow up to achieve health goals.

## 2018-11-29 ENCOUNTER — LAB VISIT (OUTPATIENT)
Dept: LAB | Facility: HOSPITAL | Age: 53
End: 2018-11-29
Attending: INTERNAL MEDICINE
Payer: COMMERCIAL

## 2018-11-29 DIAGNOSIS — F41.9 ANXIETY: ICD-10-CM

## 2018-11-29 DIAGNOSIS — I10 ESSENTIAL HYPERTENSION: ICD-10-CM

## 2018-11-29 LAB
ANION GAP SERPL CALC-SCNC: 11 MMOL/L
BUN SERPL-MCNC: 16 MG/DL
CALCIUM SERPL-MCNC: 9.7 MG/DL
CHLORIDE SERPL-SCNC: 100 MMOL/L
CO2 SERPL-SCNC: 25 MMOL/L
CREAT SERPL-MCNC: 0.9 MG/DL
EST. GFR  (AFRICAN AMERICAN): >60 ML/MIN/1.73 M^2
EST. GFR  (NON AFRICAN AMERICAN): >60 ML/MIN/1.73 M^2
GLUCOSE SERPL-MCNC: 255 MG/DL
POTASSIUM SERPL-SCNC: 3.8 MMOL/L
SODIUM SERPL-SCNC: 136 MMOL/L

## 2018-11-29 PROCEDURE — 36415 COLL VENOUS BLD VENIPUNCTURE: CPT | Mod: PO

## 2018-11-29 PROCEDURE — 80048 BASIC METABOLIC PNL TOTAL CA: CPT

## 2018-11-29 RX ORDER — ALPRAZOLAM 0.25 MG/1
0.25 TABLET ORAL DAILY PRN
Qty: 30 TABLET | Refills: 0 | Status: SHIPPED | OUTPATIENT
Start: 2018-11-29

## 2018-11-30 ENCOUNTER — PATIENT OUTREACH (OUTPATIENT)
Dept: OTHER | Facility: OTHER | Age: 53
End: 2018-11-30

## 2018-11-30 NOTE — PROGRESS NOTES
Last 5 Patient Entered Readings                                      Current 30 Day Average: 131/86     Recent Readings 11/26/2018 11/26/2018 11/25/2018 11/25/2018 11/24/2018    SBP (mmHg) 139 139 141 141 123    DBP (mmHg) 87 87 92 92 78    Pulse 74 74 86 86 91        Ms. Montenegro's BP average is improving. She had BMP done and renal function and electrolytes are within acceptable range. Her blood glucose was 255 mg/dL. She was not fasting, but this is much higher than she is used to. She has not checked her BG at home in about 6 months. She will try to get her ealth glucometer working to start checking her BG more often. Will have her report to lab next week for an A1C. It has been 3 months since her last A1C.     Patient's BP average is above goal of <130/80.     Will continue to monitor regularly. Will follow up in 2-3 weeks, sooner if BP begins to trend upward or downward.    Patient has my contact information and knows to call with any concerns or clinical changes.     Current HTN regimen:  Hypertension Medications             hydroCHLOROthiazide (HYDRODIURIL) 25 MG tablet Take 1 tablet (25 mg total) by mouth once daily.    irbesartan (AVAPRO) 150 MG tablet Take 1 tablet (150 mg total) by mouth once daily.    metoprolol tartrate (LOPRESSOR) 50 MG tablet Take 1 tablet (50 mg total) by mouth 3 (three) times daily.          Diabetes Medications             metFORMIN (GLUCOPHAGE-XR) 500 MG 24 hr tablet Take 1 tablet (500 mg total) by mouth once daily.

## 2018-12-04 ENCOUNTER — LAB VISIT (OUTPATIENT)
Dept: LAB | Facility: HOSPITAL | Age: 53
End: 2018-12-04
Attending: INTERNAL MEDICINE
Payer: COMMERCIAL

## 2018-12-04 DIAGNOSIS — R80.9 TYPE 2 DIABETES MELLITUS WITH PROTEINURIA: ICD-10-CM

## 2018-12-04 DIAGNOSIS — E11.29 TYPE 2 DIABETES MELLITUS WITH PROTEINURIA: ICD-10-CM

## 2018-12-04 LAB
ESTIMATED AVG GLUCOSE: 151 MG/DL
HBA1C MFR BLD HPLC: 6.9 %

## 2018-12-04 PROCEDURE — 83036 HEMOGLOBIN GLYCOSYLATED A1C: CPT

## 2018-12-04 PROCEDURE — 36415 COLL VENOUS BLD VENIPUNCTURE: CPT | Mod: PO

## 2018-12-11 ENCOUNTER — PATIENT OUTREACH (OUTPATIENT)
Dept: OTHER | Facility: OTHER | Age: 53
End: 2018-12-11

## 2018-12-11 NOTE — PROGRESS NOTES
Last 5 Patient Entered Readings                                      Current 30 Day Average: 131/85     Recent Readings 12/9/2018 12/9/2018 12/8/2018 12/8/2018 12/5/2018    SBP (mmHg) 121 121 122 122 123    DBP (mmHg) 82 82 90 90 76    Pulse 71 71 65 65 67        Ms. Montenegro's BP readings are trending down on higher dose of irbesartan. She is tolerating the dose increase with no issues.     Patient's BP average is above goal of <130/80.     Patient denies s/s of hypotension (lightheadedness, dizziness, nausea, fatigue) associated with low readings. Instructed patient to inform me if this occurs, patient confirms understanding.      Will continue to monitor regularly. Will follow up in 3-4 weeks, sooner if BP begins to trend upward or downward.    Patient has my contact information and knows to call with any concerns or clinical changes.     Current HTN regimen:  Hypertension Medications             hydroCHLOROthiazide (HYDRODIURIL) 25 MG tablet Take 1 tablet (25 mg total) by mouth once daily.    irbesartan (AVAPRO) 150 MG tablet Take 1 tablet (150 mg total) by mouth once daily.    metoprolol tartrate (LOPRESSOR) 50 MG tablet Take 1 tablet (50 mg total) by mouth 3 (three) times daily.              Last 6 Patient Entered Readings                                          Most Recent A1c: 6.9% on 12/4/2018  (Goal: 7%)     Recent Readings 12/8/2018 12/8/2018 12/5/2018 12/5/2018 12/4/2018    Blood Glucose (mg/dL) 100 119 114 117 140        Ms. Montenegro had an A1C done after BMP showed glucose of 255 mg/dL. A1C is at goal, but borderline. Her blood sugar readings are improving. She states her sister has diabetes and helps her in making good choices with her diet. Will continue to monitor fasting blood sugars and post prandial (2 hours after meals). If blood sugar increases, will increase metformin dose. Will check A1C again in 3-6 months.     I will continue to monitor regularly and will follow up in 3-4 weeks, sooner if  there are any concerning blood glucose readings.     Patient has my contact information and knows to call with any concerns or clinical changes.     Diabetes Medications             metFORMIN (GLUCOPHAGE-XR) 500 MG 24 hr tablet Take 1 tablet (500 mg total) by mouth once daily.

## 2019-01-07 ENCOUNTER — PATIENT OUTREACH (OUTPATIENT)
Dept: OTHER | Facility: OTHER | Age: 54
End: 2019-01-07

## 2019-01-07 NOTE — PROGRESS NOTES
Last 5 Patient Entered Readings                                      Current 30 Day Average: 131/87     Recent Readings 1/6/2019 1/6/2019 12/30/2018 12/30/2018 12/26/2018    SBP (mmHg) 131 131 140 140 136    DBP (mmHg) 85 85 90 90 85    Pulse 82 82 72 72 81          Last 6 Patient Entered Readings                                          Most Recent A1c: 6.9% on 12/4/2018  (Goal: 7%)     Recent Readings 12/13/2018 12/12/2018 12/8/2018 12/8/2018 12/5/2018    Blood Glucose (mg/dL) 112 132 100 119 114        Digital Medicine: Health  Follow Up    Feeling well.    Moved to [place] and finding it to be better than [place]. Feeling positive.    Has been checking BG with a different glucometer. Her 's work offers supplies for free so she switched to that. She plans to enter readings manually so we will still be able to monitor her. Stated they have been in a similar range.    Lifestyle Modifications:    1.Dietary Modifications:  wants to make dietary changes so she's having a better time of eating more healthfully. Last night had vegetables with baked chicken. Had a turkey sandwich for lunch which is probably why BP was elevated. Still working toward a lower sodium diet.    2.Physical Activity: Recently joined a gym in [facility] and plans to go regularly with her .    3.Medication Therapy: Patient has been compliant with the medication regimen.    4.Patient has the following medication side effects/concerns:   (Frequency/Alleviating factors/Precipitating factors, etc.)     Follow up with Mrs. Vijaya Pate Lan completed. No further questions or concerns. Will continue to follow up to achieve health goals.

## 2019-01-14 ENCOUNTER — PATIENT OUTREACH (OUTPATIENT)
Dept: OTHER | Facility: OTHER | Age: 54
End: 2019-01-14

## 2019-01-14 NOTE — PROGRESS NOTES
Last 5 Patient Entered Readings                                      Current 30 Day Average: 133/88     Recent Readings 1/11/2019 1/11/2019 1/11/2019 1/11/2019 1/8/2019    SBP (mmHg) 152 152 146 146 129    DBP (mmHg) 101 101 91 91 84    Pulse 71 71 69 69 84        Mrs. Montenegro's BP was elevated but she thinks it was due to stress. She was upset after an incident with her son. She has not checked her BP again since it was elevated, but will check a reading today.     Patient's BP average is above goal of <130/80.     Will continue to monitor regularly. Will follow up in 6-8 weeks, sooner if BP begins to trend upward or downward.    Patient has my contact information and knows to call with any concerns or clinical changes.     Current HTN regimen:  Hypertension Medications             hydroCHLOROthiazide (HYDRODIURIL) 25 MG tablet Take 1 tablet (25 mg total) by mouth once daily.    irbesartan (AVAPRO) 150 MG tablet Take 1 tablet (150 mg total) by mouth once daily.    metoprolol tartrate (LOPRESSOR) 50 MG tablet Take 1 tablet (50 mg total) by mouth 3 (three) times daily.          Last 6 Patient Entered Readings                                          Most Recent A1c: 6.9% on 12/4/2018  (Goal: 7%)     Recent Readings 12/13/2018 12/12/2018 12/8/2018 12/8/2018 12/5/2018    Blood Glucose (mg/dL) 112 132 100 119 114        Mrs. Montenegro will start entering BG readings manually.     I will continue to monitor regularly and will follow up in 6-8 weeks. Patient has my contact information and knows to call with any concerns or clinical changes.     Diabetes Medications             metFORMIN (GLUCOPHAGE-XR) 500 MG 24 hr tablet Take 1 tablet (500 mg total) by mouth once daily.

## 2019-01-28 ENCOUNTER — PATIENT OUTREACH (OUTPATIENT)
Dept: OTHER | Facility: OTHER | Age: 54
End: 2019-01-28

## 2019-01-28 NOTE — PROGRESS NOTES
Last 5 Patient Entered Readings                                      Current 30 Day Average: 135/88     Recent Readings 1/21/2019 1/21/2019 1/17/2019 1/17/2019 1/11/2019    SBP (mmHg) 123 123 138 138 152    DBP (mmHg) 79 79 86 86 101    Pulse 86 86 73 73 71        Mrs. Montenegro's BP is trending down. She has not checked BP often, but will try to do so going forward. She has been trying to eat more healthfully. She has no concerns at this time.    Patient's BP average is above goal of <130/80.     Will continue to monitor regularly. Will follow up in 1-2 months, sooner if BP begins to trend upward or downward.    Patient has my contact information and knows to call with any concerns or clinical changes.     Current HTN regimen:  Hypertension Medications             hydroCHLOROthiazide (HYDRODIURIL) 25 MG tablet Take 1 tablet (25 mg total) by mouth once daily.    irbesartan (AVAPRO) 150 MG tablet Take 1 tablet (150 mg total) by mouth once daily.    metoprolol tartrate (LOPRESSOR) 50 MG tablet Take 1 tablet (50 mg total) by mouth 3 (three) times daily.            Last 6 Patient Entered Readings                                          Most Recent A1c: 6.9% on 12/4/2018  (Goal: 7%)     Recent Readings 12/13/2018 12/12/2018 12/8/2018 12/8/2018 12/5/2018    Blood Glucose (mg/dL) 112 132 100 119 114        Mrs. Montenegro has not checked her BG recently.     Patient's health , Meghann Herrera, will be following up every 3-4 weeks. I will continue to monitor regularly and will follow up in 1-2 months, sooner if there are any concerning blood glucose readings.     Patient has my contact information and knows to call with any concerns or clinical changes.     Diabetes Medications             metFORMIN (GLUCOPHAGE-XR) 500 MG 24 hr tablet Take 1 tablet (500 mg total) by mouth once daily.

## 2019-01-30 ENCOUNTER — TELEPHONE (OUTPATIENT)
Dept: FAMILY MEDICINE | Facility: CLINIC | Age: 54
End: 2019-01-30

## 2019-02-06 NOTE — PROGRESS NOTES
Last 5 Patient Entered Redings Current 30 Day Average: 120/77     Recent Readings 10/2/2017 10/1/2017 9/28/2017 9/27/2017 9/25/2017    Systolic BP (mmHg) 116 104 127 125 113    Diastolic BP (mmHg) 80 75 77 76 77    Pulse 109 92 109 98 72        Ms. Montenegro's BP average remains controlled on 1/2 tablet of irbesartan daily. She is comfortable with her readings on the lower dose of irbesartan. Will continue this dose going forward.     Current HTN regimen:  Hypertension Medications             hydrochlorothiazide (MICROZIDE) 12.5 mg capsule TAKE ONE CAPSULE BY MOUTH ONCE A DAY    irbesartan (AVAPRO) 150 MG tablet Take 0.5 tablets (75 mg total) by mouth once daily.    metoprolol tartrate (LOPRESSOR) 50 MG tablet TAKE 1 TABLET(S) BY MOUTH THREE TIMES DAILY          Will continue to monitor regularly. Will follow up in 3 months, sooner if BP begins to trend upward or downward.    Patient has my contact information and knows to call with any concerns or clinical changes.            no discharge, no irritation, no pain, no redness, and no visual changes.

## 2019-02-11 ENCOUNTER — PATIENT OUTREACH (OUTPATIENT)
Dept: OTHER | Facility: OTHER | Age: 54
End: 2019-02-11

## 2019-02-11 NOTE — PROGRESS NOTES
Last 5 Patient Entered Readings                                      Current 30 Day Average: 128/78     Recent Readings 2/10/2019 2/10/2019 2/10/2019 2/10/2019 2/7/2019    SBP (mmHg) 113 113 96 96 130    DBP (mmHg) 80 80 62 62 76    Pulse 78 78 77 77 76        Digital Medicine: Health  Follow Up    Feeling well. Happier in [place] than in [place] - more active, less stress.    Plans to manually enter BG readings.    Lifestyle Modifications:    1.Dietary Modifications: Eating more salmon, chicken, vegetables. When dining out making better choices. Encouraged her to continue working on balanced meals and monitoring sodium.    2.Physical Activity: deferred    3.Medication Therapy: Patient has been compliant with the medication regimen.    4.Patient has the following medication side effects/concerns:   (Frequency/Alleviating factors/Precipitating factors, etc.)     Follow up with Jacquie Vijaya Herlinda Montenegro completed. No further questions or concerns. Will continue to follow up to achieve health goals.

## 2019-02-13 LAB
HPV, HIGH-RISK: NOT DETECTED
PAP RECOMMENDATION EXT: NORMAL
PAP SMEAR: NORMAL

## 2019-02-25 ENCOUNTER — TELEPHONE (OUTPATIENT)
Dept: FAMILY MEDICINE | Facility: CLINIC | Age: 54
End: 2019-02-25

## 2019-02-25 DIAGNOSIS — E78.5 HYPERLIPIDEMIA WITH TARGET LDL LESS THAN 100: ICD-10-CM

## 2019-02-25 RX ORDER — PRAVASTATIN SODIUM 20 MG/1
20 TABLET ORAL DAILY
Qty: 90 TABLET | Refills: 0 | Status: SHIPPED | OUTPATIENT
Start: 2019-02-25 | End: 2019-04-10 | Stop reason: SDUPTHER

## 2019-02-25 NOTE — TELEPHONE ENCOUNTER
----- Message from Joss Silva sent at 2/25/2019 12:16 PM CST -----  Contact: self  Refill request: pravastatin (PRAVACHOL) 20 MG tablet      [facility] on [address] in [place]      [phone]    Thanks

## 2019-03-15 ENCOUNTER — CLINICAL SUPPORT (OUTPATIENT)
Dept: OPHTHALMOLOGY | Facility: CLINIC | Age: 54
End: 2019-03-15
Attending: INTERNAL MEDICINE
Payer: COMMERCIAL

## 2019-03-15 DIAGNOSIS — H47.392 OPTIC DISC HEMORRHAGE, LEFT: Primary | ICD-10-CM

## 2019-03-15 DIAGNOSIS — R80.9 TYPE 2 DIABETES MELLITUS WITH PROTEINURIA: ICD-10-CM

## 2019-03-15 DIAGNOSIS — E11.29 TYPE 2 DIABETES MELLITUS WITH PROTEINURIA: ICD-10-CM

## 2019-03-15 PROCEDURE — 92250 DIABETIC EYE SCREENING PHOTO: ICD-10-PCS | Mod: S$GLB,,, | Performed by: OPHTHALMOLOGY

## 2019-03-15 PROCEDURE — 92250 FUNDUS PHOTOGRAPHY W/I&R: CPT | Mod: S$GLB,,, | Performed by: OPHTHALMOLOGY

## 2019-03-15 NOTE — PROGRESS NOTES
HPI     54 Y/o here for screening for diabetic retinopathy with non-dilated   fundus photos per Dr. Hoover     Last edited by Cipriano Burt MA on 3/15/2019  9:41 AM. (History)            Assessment /Plan     For exam results, see Encounter Report.    Type 2 diabetes mellitus with proteinuria  -     Diabetic Eye Screening Photo      Please see Dr. Huang progress note for interpretation

## 2019-03-18 ENCOUNTER — PATIENT OUTREACH (OUTPATIENT)
Dept: OTHER | Facility: OTHER | Age: 54
End: 2019-03-18

## 2019-03-18 ENCOUNTER — TELEPHONE (OUTPATIENT)
Dept: OPTOMETRY | Facility: CLINIC | Age: 54
End: 2019-03-18

## 2019-03-18 DIAGNOSIS — H47.392 OPTIC DISC HEMORRHAGE, LEFT: Primary | ICD-10-CM

## 2019-03-18 LAB
LEFT EYE DM RETINOPATHY: NEGATIVE
RIGHT EYE DM RETINOPATHY: NEGATIVE

## 2019-03-18 NOTE — PROGRESS NOTES
Last 5 Patient Entered Readings                                      Current 30 Day Average: 126/83     Recent Readings 3/13/2019 3/13/2019 3/8/2019 3/8/2019 3/7/2019    SBP (mmHg) 115 115 133 133 129    DBP (mmHg) 77 77 81 81 85    Pulse 82 82 75 75 86        Digital Medicine: Health  Follow Up    Taking BG readings at home with another glucometer. Encouraged her to enter them into [employer].    Had a the camera eye exam done last week. She's concerned that a hemorrhage was found in her left eye. Plans to discuss with Dr. Hoover in more detail.    Lifestyle Modifications:    1.Dietary Modifications (Sodium intake <2,000mg/day, food labels, dining out): got off track while she was on a cruise but being more mindful now.    2.Physical Activity: goal is to get in 10 workouts by our next encounter. Has a gym membership and plans to use the treadmill.    3.Medication Therapy: Patient has been compliant with the medication regimen.    4.Patient has the following medication side effects/concerns:   (Frequency/Alleviating factors/Precipitating factors, etc.)     Follow up with Mrs. Vijaya Henriquezmary Montenegro completed. No further questions or concerns. Will continue to follow up to achieve health goals.

## 2019-03-19 ENCOUNTER — TELEPHONE (OUTPATIENT)
Dept: FAMILY MEDICINE | Facility: CLINIC | Age: 54
End: 2019-03-19

## 2019-03-19 DIAGNOSIS — Z12.31 ENCOUNTER FOR SCREENING MAMMOGRAM FOR BREAST CANCER: ICD-10-CM

## 2019-03-19 DIAGNOSIS — R80.9 TYPE 2 DIABETES MELLITUS WITH PROTEINURIA: ICD-10-CM

## 2019-03-19 DIAGNOSIS — I10 ESSENTIAL HYPERTENSION: Primary | ICD-10-CM

## 2019-03-19 DIAGNOSIS — E78.5 HYPERLIPIDEMIA WITH TARGET LDL LESS THAN 100: ICD-10-CM

## 2019-03-19 DIAGNOSIS — E11.29 TYPE 2 DIABETES MELLITUS WITH PROTEINURIA: ICD-10-CM

## 2019-03-19 NOTE — TELEPHONE ENCOUNTER
----- Message from Addy Higgins sent at 3/19/2019 11:09 AM CDT -----  Contact: Self/[phone]  The patient would like orders placed for blood work and mammo. Once the orders are in the system the patient would like to be notified.          Thank you

## 2019-03-19 NOTE — TELEPHONE ENCOUNTER
Left voicemessage with patient that MMG order was placed - she can schedule herself on-line. Also advised her I ordered her labs and she can call us back or send a [facility] message to give us a date and time so we can schedule it for her.

## 2019-03-20 ENCOUNTER — PATIENT MESSAGE (OUTPATIENT)
Dept: FAMILY MEDICINE | Facility: CLINIC | Age: 54
End: 2019-03-20

## 2019-03-26 DIAGNOSIS — E11.29 TYPE 2 DIABETES MELLITUS WITH PROTEINURIA: ICD-10-CM

## 2019-03-26 DIAGNOSIS — I10 ESSENTIAL HYPERTENSION: ICD-10-CM

## 2019-03-26 DIAGNOSIS — R80.9 TYPE 2 DIABETES MELLITUS WITH PROTEINURIA: ICD-10-CM

## 2019-03-26 RX ORDER — HYDROCHLOROTHIAZIDE 25 MG/1
25 TABLET ORAL DAILY
Qty: 90 TABLET | Refills: 0 | Status: SHIPPED | OUTPATIENT
Start: 2019-03-26 | End: 2019-04-10 | Stop reason: SDUPTHER

## 2019-03-26 RX ORDER — METOPROLOL TARTRATE 50 MG/1
50 TABLET ORAL 3 TIMES DAILY
Qty: 270 TABLET | Refills: 0 | Status: SHIPPED | OUTPATIENT
Start: 2019-03-26 | End: 2019-04-10 | Stop reason: SDUPTHER

## 2019-03-26 RX ORDER — METFORMIN HYDROCHLORIDE 500 MG/1
500 TABLET, EXTENDED RELEASE ORAL DAILY
Qty: 90 TABLET | Refills: 0 | Status: SHIPPED | OUTPATIENT
Start: 2019-03-26 | End: 2019-04-10 | Stop reason: SDUPTHER

## 2019-03-26 NOTE — TELEPHONE ENCOUNTER
----- Message from Wes Magana sent at 3/26/2019 12:35 PM CDT -----  Contact: Self: [phone]  Refill Requests:   #hydroCHLOROthiazide (HYDRODIURIL) 25 MG tablet  #metoprolol tartrate (LOPRESSOR) 50 MG tablet  #metFORMIN (GLUCOPHAGE-XR) 500 MG 24 hr tablet    Please call to advise patient upon receipt to pharmacy,  Thank you      [id number]  [address] - [address] [address] [facility]  [place] [address]  Phone: [phone] Fax: [phone]

## 2019-03-29 ENCOUNTER — PATIENT OUTREACH (OUTPATIENT)
Dept: OTHER | Facility: OTHER | Age: 54
End: 2019-03-29

## 2019-03-29 NOTE — PROGRESS NOTES
Last 5 Patient Entered Readings                                      Current 30 Day Average: 127/85     Recent Readings 3/28/2019 3/28/2019 3/27/2019 3/27/2019 3/26/2019    SBP (mmHg) 134 134 120 120 121    DBP (mmHg) 88 88 81 81 86    Pulse 73 73 82 82 72        Per 30 day average, 127/85 mmHg, patient's BP is at goal.     Ms. Blounts BP has improved. She is sending in readings more often. She has no questions or concerns about her BP    Patient's health , Meghann Herrera, will be following up. I will continue to monitor regularly and will follow up in 2-3 months, sooner if BP begins to trend upward or downward.    Asked patient to call or message with questions or concerns.     Current HTN regimen:  Hypertension Medications             hydroCHLOROthiazide (HYDRODIURIL) 25 MG tablet Take 1 tablet (25 mg total) by mouth once daily.    irbesartan (AVAPRO) 150 MG tablet Take 1 tablet (150 mg total) by mouth once daily.    metoprolol tartrate (LOPRESSOR) 50 MG tablet Take 1 tablet (50 mg total) by mouth 3 (three) times daily.            Last 6 Patient Entered Readings                                          Most Recent A1c: 6.9% on 12/4/2018  (Goal: 7%)     Recent Readings 3/29/2019 3/20/2019 3/19/2019 12/13/2018 12/12/2018    Blood Glucose (mg/dL) 122 131 151 112 132        Ms. Hammond BG readings are meeting target ranges. Most readings are fasting. Asked that she send in fasting readings some days and some days check her BG 2 hours after a meal. She will continue to work with her health , Meghann, on appropriate food choices.     I will continue to monitor regularly and will follow up in 2-3 months, sooner if there are any concerning blood glucose readings.     Asked patient to contact me with any concerns or clinical changes.     Diabetes Medications             metFORMIN (GLUCOPHAGE-XR) 500 MG 24 hr tablet Take 1 tablet (500 mg total) by mouth once daily.

## 2019-04-08 ENCOUNTER — INITIAL CONSULT (OUTPATIENT)
Dept: OPHTHALMOLOGY | Facility: CLINIC | Age: 54
End: 2019-04-08
Attending: OPHTHALMOLOGY
Payer: COMMERCIAL

## 2019-04-08 ENCOUNTER — HOSPITAL ENCOUNTER (OUTPATIENT)
Dept: RADIOLOGY | Facility: HOSPITAL | Age: 54
Discharge: HOME | End: 2019-04-08
Attending: INTERNAL MEDICINE
Payer: COMMERCIAL

## 2019-04-08 VITALS — HEIGHT: 61 IN | BODY MASS INDEX: 31.34 KG/M2 | WEIGHT: 166 LBS

## 2019-04-08 DIAGNOSIS — H47.392 OPTIC DISC HEMORRHAGE, LEFT: ICD-10-CM

## 2019-04-08 DIAGNOSIS — Z12.31 ENCOUNTER FOR SCREENING MAMMOGRAM FOR BREAST CANCER: ICD-10-CM

## 2019-04-08 DIAGNOSIS — E11.9 DIABETES MELLITUS TYPE 2 WITHOUT RETINOPATHY: Primary | ICD-10-CM

## 2019-04-08 LAB
LEFT EYE DM RETINOPATHY: NEGATIVE
RIGHT EYE DM RETINOPATHY: NEGATIVE

## 2019-04-08 PROCEDURE — 77063 BREAST TOMOSYNTHESIS BI: CPT | Mod: 26,,, | Performed by: RADIOLOGY

## 2019-04-08 PROCEDURE — 92004 PR EYE EXAM, NEW PATIENT,COMPREHESV: ICD-10-PCS | Mod: S$GLB,,, | Performed by: OPHTHALMOLOGY

## 2019-04-08 PROCEDURE — 92133 POSTERIOR SEGMENT OCT OPTIC NERVE(OCULAR COHERENCE TOMOGRAPHY) - OU - BOTH EYES: ICD-10-PCS | Mod: S$GLB,,, | Performed by: OPHTHALMOLOGY

## 2019-04-08 PROCEDURE — 99999 PR PBB SHADOW E&M-EST. PATIENT-LVL II: ICD-10-PCS | Mod: PBBFAC,,, | Performed by: OPHTHALMOLOGY

## 2019-04-08 PROCEDURE — 99999 PR PBB SHADOW E&M-EST. PATIENT-LVL II: CPT | Mod: PBBFAC,,, | Performed by: OPHTHALMOLOGY

## 2019-04-08 PROCEDURE — 77067 SCR MAMMO BI INCL CAD: CPT | Mod: TC,PO

## 2019-04-08 PROCEDURE — 77063 MAMMO DIGITAL SCREENING BILAT WITH TOMOSYNTHESIS_CAD: ICD-10-PCS | Mod: 26,,, | Performed by: RADIOLOGY

## 2019-04-08 PROCEDURE — 92004 COMPRE OPH EXAM NEW PT 1/>: CPT | Mod: S$GLB,,, | Performed by: OPHTHALMOLOGY

## 2019-04-08 PROCEDURE — 77067 MAMMO DIGITAL SCREENING BILAT WITH TOMOSYNTHESIS_CAD: ICD-10-PCS | Mod: 26,,, | Performed by: RADIOLOGY

## 2019-04-08 PROCEDURE — 92133 CPTRZD OPH DX IMG PST SGM ON: CPT | Mod: S$GLB,,, | Performed by: OPHTHALMOLOGY

## 2019-04-08 PROCEDURE — 77067 SCR MAMMO BI INCL CAD: CPT | Mod: 26,,, | Performed by: RADIOLOGY

## 2019-04-08 NOTE — PROGRESS NOTES
HPI     Referred by Dr Hoover.  Pt here for diabetic eye exam and disc hemorrhage noted on screening   fundus photos    Va good OU.  No f/f/pain OU      Last eye exam 2018.  DM+ NIDDM  HBP+  CHOL+    GLAUCOMA+ grandmother  S/P LASIX 15 years ago.        Last edited by Franck Fletcher MD on 4/8/2019  9:17 AM. (History)            Assessment /Plan     For exam results, see Encounter Report.    Diabetes mellitus type 2 without retinopathy    Optic disc hemorrhage, left  -     Posterior Segment OCT Optic Nerve- Both eyes      Reviewed 3/2018 fundus photos.  Had heme off ONH OS.  Resolved today    Pt has DM treated for about 1 yr  Reports recent h/o poor BP control.  Says better now  FH gl in GM.   IOP good.  RNFL normal.  Will observe    CV risk factor control  RTC 6 months with OCT RNFL, sooner PRN

## 2019-04-08 NOTE — LETTER
April 8, 2019      Lanette Hoover MD  [address]  [place] [address]           [facility]  [address]  [place] [address]  Phone: [phone]  Fax: [phone]          Patient: Vijaya Montenegro   MR Number: [id number]   YOB: 1965   Date of Visit: 4/8/2019       Dear Dr. Lanette Hoover:    Thank you for referring Vijaya Montenegro to me for evaluation. Attached you will find relevant portions of my assessment and plan of care.    If you have questions, please do not hesitate to call me. I look forward to following Vijaya Montenegro along with you.    Sincerely,    Franck Fletcher MD    Enclosure  CC:  No Recipients    If you would like to receive this communication electronically, please contact [email or web address] or [phone] to request more information on [employer] access.    For providers and/or their staff who would like to refer a patient to [facility], please contact us through our one-stop-shop provider referral line, [facility], at [phone].    If you feel you have received this communication in error or would no longer like to receive these types of communications, please e-mail [email or web address]

## 2019-04-09 ENCOUNTER — TELEPHONE (OUTPATIENT)
Dept: RADIOLOGY | Facility: HOSPITAL | Age: 54
End: 2019-04-09

## 2019-04-10 ENCOUNTER — OFFICE VISIT (OUTPATIENT)
Dept: FAMILY MEDICINE | Facility: CLINIC | Age: 54
End: 2019-04-10
Payer: COMMERCIAL

## 2019-04-10 VITALS
HEART RATE: 95 BPM | SYSTOLIC BLOOD PRESSURE: 126 MMHG | DIASTOLIC BLOOD PRESSURE: 78 MMHG | TEMPERATURE: 98 F | HEIGHT: 61 IN | BODY MASS INDEX: 31.01 KG/M2 | OXYGEN SATURATION: 96 % | WEIGHT: 164.25 LBS

## 2019-04-10 DIAGNOSIS — F41.9 ANXIETY: ICD-10-CM

## 2019-04-10 DIAGNOSIS — I10 ESSENTIAL HYPERTENSION: ICD-10-CM

## 2019-04-10 DIAGNOSIS — E78.5 HYPERLIPIDEMIA WITH TARGET LDL LESS THAN 100: ICD-10-CM

## 2019-04-10 DIAGNOSIS — R80.9 TYPE 2 DIABETES MELLITUS WITH PROTEINURIA: Primary | ICD-10-CM

## 2019-04-10 DIAGNOSIS — E11.29 TYPE 2 DIABETES MELLITUS WITH PROTEINURIA: Primary | ICD-10-CM

## 2019-04-10 DIAGNOSIS — E66.9 OBESITY (BMI 30-39.9): ICD-10-CM

## 2019-04-10 PROCEDURE — 3078F PR MOST RECENT DIASTOLIC BLOOD PRESSURE < 80 MM HG: ICD-10-PCS | Mod: CPTII,S$GLB,, | Performed by: INTERNAL MEDICINE

## 2019-04-10 PROCEDURE — 3078F DIAST BP <80 MM HG: CPT | Mod: CPTII,S$GLB,, | Performed by: INTERNAL MEDICINE

## 2019-04-10 PROCEDURE — 99214 OFFICE O/P EST MOD 30 MIN: CPT | Mod: S$GLB,,, | Performed by: INTERNAL MEDICINE

## 2019-04-10 PROCEDURE — 3045F PR MOST RECENT HEMOGLOBIN A1C LEVEL 7.0-9.0%: CPT | Mod: CPTII,S$GLB,, | Performed by: INTERNAL MEDICINE

## 2019-04-10 PROCEDURE — 99999 PR PBB SHADOW E&M-EST. PATIENT-LVL III: CPT | Mod: PBBFAC,,, | Performed by: INTERNAL MEDICINE

## 2019-04-10 PROCEDURE — 3074F PR MOST RECENT SYSTOLIC BLOOD PRESSURE < 130 MM HG: ICD-10-PCS | Mod: CPTII,S$GLB,, | Performed by: INTERNAL MEDICINE

## 2019-04-10 PROCEDURE — 3074F SYST BP LT 130 MM HG: CPT | Mod: CPTII,S$GLB,, | Performed by: INTERNAL MEDICINE

## 2019-04-10 PROCEDURE — 99999 PR PBB SHADOW E&M-EST. PATIENT-LVL III: ICD-10-PCS | Mod: PBBFAC,,, | Performed by: INTERNAL MEDICINE

## 2019-04-10 PROCEDURE — 99214 PR OFFICE/OUTPT VISIT, EST, LEVL IV, 30-39 MIN: ICD-10-PCS | Mod: S$GLB,,, | Performed by: INTERNAL MEDICINE

## 2019-04-10 PROCEDURE — 3008F PR BODY MASS INDEX (BMI) DOCUMENTED: ICD-10-PCS | Mod: CPTII,S$GLB,, | Performed by: INTERNAL MEDICINE

## 2019-04-10 PROCEDURE — 3008F BODY MASS INDEX DOCD: CPT | Mod: CPTII,S$GLB,, | Performed by: INTERNAL MEDICINE

## 2019-04-10 PROCEDURE — 3045F PR MOST RECENT HEMOGLOBIN A1C LEVEL 7.0-9.0%: ICD-10-PCS | Mod: CPTII,S$GLB,, | Performed by: INTERNAL MEDICINE

## 2019-04-10 RX ORDER — PRAVASTATIN SODIUM 20 MG/1
20 TABLET ORAL DAILY
Qty: 90 TABLET | Refills: 2 | Status: SHIPPED | OUTPATIENT
Start: 2019-04-10 | End: 2020-01-27 | Stop reason: SDUPTHER

## 2019-04-10 RX ORDER — IRBESARTAN 150 MG/1
150 TABLET ORAL DAILY
Qty: 90 TABLET | Refills: 2 | Status: SHIPPED | OUTPATIENT
Start: 2019-04-10 | End: 2019-07-01

## 2019-04-10 RX ORDER — METOPROLOL TARTRATE 50 MG/1
50 TABLET ORAL 3 TIMES DAILY
Qty: 270 TABLET | Refills: 2 | Status: SHIPPED | OUTPATIENT
Start: 2019-04-10 | End: 2020-01-27 | Stop reason: SDUPTHER

## 2019-04-10 RX ORDER — HYDROCHLOROTHIAZIDE 25 MG/1
25 TABLET ORAL DAILY
Qty: 90 TABLET | Refills: 2 | Status: SHIPPED | OUTPATIENT
Start: 2019-04-10 | End: 2020-01-27 | Stop reason: SDUPTHER

## 2019-04-10 RX ORDER — METFORMIN HYDROCHLORIDE 500 MG/1
500 TABLET, EXTENDED RELEASE ORAL DAILY
Qty: 90 TABLET | Refills: 2 | Status: SHIPPED | OUTPATIENT
Start: 2019-04-10 | End: 2020-01-27 | Stop reason: SDUPTHER

## 2019-04-10 NOTE — PROGRESS NOTES
HISTORY OF PRESENT ILLNESS:  Vijaya Montenegro is a 53 y.o. female who presents to the clinic today for Follow-up and Diabetes  .   The patient presents to clinic today for follow-up of her type 2 diabetes mellitus complicated by proteinuria, hypertension, and hyperlipidemia.  She recently did the I picture for diabetic screening.  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  She has since been seen by the retina specialist to states that has resolved.  He thinks it might have been from a spike in her blood pressure.  The patient currently lives in [place].  She is much happier there than she was in [place].  She comes home frequently to [place].  She is on the digital hypertension and blood sugar program.  She is doing well with both.  She denies any significant problems with heartburn or reflux.  She states her anxiety is pretty much resolved.      PAST MEDICAL HISTORY:  Past Medical History:   Diagnosis Date    Anxiety     GERD (gastroesophageal reflux disease)     Gout, arthritis     Hyperlipidemia LDL goal < 100     Hypertension     Obesity     Type II or unspecified type diabetes mellitus without mention of complication, not stated as uncontrolled     diet controlled       PAST SURGICAL HISTORY:  Past Surgical History:   Procedure Laterality Date     SECTION, LOW TRANSVERSE      x2    REFRACTIVE SURGERY Bilateral     [facility]       SOCIAL HISTORY:  Social History     Socioeconomic History    Marital status:      Spouse name: Not on file    Number of children: 2    Years of education: Not on file    Highest education level: Not on file   Occupational History    Not on file   Social Needs    Financial resource strain: Not on file    Food insecurity:     Worry: Not on file     Inability: Not on file    Transportation needs:     Medical: Not on file     Non-medical: Not on file   Tobacco Use    Smoking status: Never Smoker    Smokeless  tobacco: Never Used   Substance and Sexual Activity    Alcohol use: Yes    Drug use: No    Sexual activity: Yes     Partners: Male   Lifestyle    Physical activity:     Days per week: Not on file     Minutes per session: Not on file    Stress: Not on file   Relationships    Social connections:     Talks on phone: Not on file     Gets together: Not on file     Attends Zoroastrianism service: Not on file     Active member of club or organization: Not on file     Attends meetings of clubs or organizations: Not on file     Relationship status: Not on file   Other Topics Concern    Not on file   Social History Narrative    Not on file       FAMILY HISTORY:  Family History   Problem Relation Age of Onset    Hypertension Father     Bladder Cancer Father     Cancer Father         bladder    Diabetes Sister     Heart attack Maternal Grandmother     Cataracts Maternal Grandmother     COPD Mother     Blindness Mother         left eye; eye injury    Thyroid disease Mother     No Known Problems Maternal Grandfather     Diabetes Paternal Grandmother     No Known Problems Paternal Grandfather     Diabetes Paternal Aunt     Diabetes Paternal Aunt     No Known Problems Maternal Aunt     No Known Problems Maternal Uncle     No Known Problems Paternal Uncle     Breast cancer Maternal Cousin     Colon cancer Neg Hx     Amblyopia Neg Hx     Glaucoma Neg Hx     Macular degeneration Neg Hx     Retinal detachment Neg Hx     Strabismus Neg Hx     Stroke Neg Hx        ALLERGIES AND MEDICATIONS: updated and reviewed.  Review of patient's allergies indicates:   Allergen Reactions    Codeine Other (See Comments)     Medication List with Changes/Refills   Current Medications    ALPRAZOLAM (XANAX) 0.25 MG TABLET    Take 1 tablet (0.25 mg total) by mouth daily as needed for Anxiety.    BLOOD SUGAR DIAGNOSTIC STRP    Test one time daily.    LANCETS 31 GAUGE MISC    1 lancet by Misc.(Non-Drug; Combo Route) route 2 (two)  times daily. Test BID    PANTOPRAZOLE (PROTONIX) 40 MG TABLET    Take 1 tablet (40 mg total) by mouth daily as needed (heartburn). Take only as needed.    PREVIFEM 0.25-35 MG-MCG PER TABLET    Take 1 tablet by mouth once daily.   Changed and/or Refilled Medications    Modified Medication Previous Medication    HYDROCHLOROTHIAZIDE (HYDRODIURIL) 25 MG TABLET hydroCHLOROthiazide (HYDRODIURIL) 25 MG tablet       Take 1 tablet (25 mg total) by mouth once daily.    Take 1 tablet (25 mg total) by mouth once daily.    IRBESARTAN (AVAPRO) 150 MG TABLET irbesartan (AVAPRO) 150 MG tablet       Take 1 tablet (150 mg total) by mouth once daily.    Take 1 tablet (150 mg total) by mouth once daily.    METFORMIN (GLUCOPHAGE-XR) 500 MG 24 HR TABLET metFORMIN (GLUCOPHAGE-XR) 500 MG 24 hr tablet       Take 1 tablet (500 mg total) by mouth once daily.    Take 1 tablet (500 mg total) by mouth once daily.    METOPROLOL TARTRATE (LOPRESSOR) 50 MG TABLET metoprolol tartrate (LOPRESSOR) 50 MG tablet       Take 1 tablet (50 mg total) by mouth 3 (three) times daily.    Take 1 tablet (50 mg total) by mouth 3 (three) times daily.    PRAVASTATIN (PRAVACHOL) 20 MG TABLET pravastatin (PRAVACHOL) 20 MG tablet       Take 1 tablet (20 mg total) by mouth once daily.    Take 1 tablet (20 mg total) by mouth once daily.          CARE TEAM:  Patient Care Team:  Lanette Hoover MD as PCP - General (Internal Medicine)  Meghann Herrera as Digital Medicine Health   Lanette Hoover MD as Hypertension Digital Medicine Responsible Provider (Internal Medicine)  Karley Campbell, PharmD as Hypertension Digital Medicine Clinician (Pharmacist)  Karley Campbell, PharmD as Diabetes Digital Medicine Clinician (Pharmacist)  Lanette Hoover MD as Diabetes Digital Medicine Responsible Provider (Internal Medicine)         REVIEW OF SYSTEMS:  Review of Systems   Constitutional: Negative for chills, fatigue, fever and unexpected weight change.   HENT: Negative for  "congestion and postnasal drip.    Eyes: Negative for pain and visual disturbance.   Respiratory: Negative for cough, shortness of breath and wheezing.    Cardiovascular: Negative for chest pain, palpitations and leg swelling.   Gastrointestinal: Negative for abdominal pain, constipation, diarrhea, nausea and vomiting.   Genitourinary: Negative for dysuria.   Musculoskeletal: Negative for arthralgias and back pain.   Skin: Negative for rash.   Neurological: Negative for weakness and headaches.   Psychiatric/Behavioral: Negative for dysphoric mood and sleep disturbance. The patient is not nervous/anxious.          PHYSICAL EXAM:   Vitals:    04/10/19 1556   BP: 126/78   Pulse: 95   Temp: 98.1 °F (36.7 °C)     Weight: 74.5 kg (164 lb 3.9 oz)   Height: 5' 1" (154.9 cm)   Body mass index is 31.03 kg/m².     General appearance - alert, well appearing, and in no distress and obese  Mental status - alert, oriented to person, place, and time, normal mood, behavior, speech, dress, motor activity, and thought processes  Eyes - pupils equal and reactive, extraocular eye movements intact, sclera anicteric  Mouth - mucous membranes moist, pharynx normal without lesions  Neck - supple, no significant adenopathy, carotids upstroke normal bilaterally, no bruits  Lymphatics - no palpable lymphadenopathy  Chest - clear to auscultation, no wheezes, rales or rhonchi, symmetric air entry  Heart - normal rate and regular rhythm, no gallops noted  Neurological - alert, oriented, normal speech, no focal findings or movement disorder noted, cranial nerves II through XII intact  Musculoskeletal - no joint tenderness, deformity or swelling, no muscular tenderness noted  Extremities - peripheral pulses normal, no pedal edema, no clubbing or cyanosis  Skin - normal coloration and turgor, no rashes, no suspicious skin lesions noted      Lab Results   Component Value Date    HGBA1C 7.1 (H) 04/08/2019    HGBA1C 6.9 (H) 12/04/2018    HGBA1C 6.7 (H) " 08/13/2018      Lab Results   Component Value Date    CHOL 196 04/08/2019    CHOL 190 08/13/2018    CHOL 141 03/06/2017     Lab Results   Component Value Date    LDLCALC 114.8 04/08/2019    LDLCALC 112.8 08/13/2018    LDLCALC 68.8 03/06/2017          ASSESSMENT AND PLAN:  1. Type 2 diabetes mellitus with proteinuria  Diabetes currently is controlled for age and comorbid conditions. We discussed diabetic diet and regular exercise. We discussed home blood sugar monitoring, if appropriate. Continue current medication regimen. and Recheck A1c in 6 months.  Diabetic complications addressed: Not applicable.  Patient was counseled on the need for yearly diabetic retinopathy exam and yearly diabetic foot exam.   - metFORMIN (GLUCOPHAGE-XR) 500 MG 24 hr tablet; Take 1 tablet (500 mg total) by mouth once daily.  Dispense: 90 tablet; Refill: 2    2. Essential hypertension  Discussed sodium restriction, maintaining ideal body weight and regular exercise program as physiologic means to achieve blood pressure control. The patient will strive towards this. The current medical regimen is effective;  continue present plan and medications. Recommended patient to check home readings to monitor and see me for followup as scheduled or sooner as needed. Patient was educated that both decongestant and anti-inflammatory medication may raise blood pressure.   - hydroCHLOROthiazide (HYDRODIURIL) 25 MG tablet; Take 1 tablet (25 mg total) by mouth once daily.  Dispense: 90 tablet; Refill: 2  - irbesartan (AVAPRO) 150 MG tablet; Take 1 tablet (150 mg total) by mouth once daily.  Dispense: 90 tablet; Refill: 2  - metoprolol tartrate (LOPRESSOR) 50 MG tablet; Take 1 tablet (50 mg total) by mouth 3 (three) times daily.  Dispense: 270 tablet; Refill: 2    3. Hyperlipidemia with target LDL less than 100  We discussed low fat diet and regular exercise.The current medical regimen is effective;  continue present plan and medications.    - pravastatin  (PRAVACHOL) 20 MG tablet; Take 1 tablet (20 mg total) by mouth once daily.  Dispense: 90 tablet; Refill: 2    4. Anxiety  improved. Observe.    5. Obesity (BMI 30-39.9)  The patient is asked to make an attempt to improve diet and exercise patterns to aid in medical management of this problem.            Follow up in about 6 months (around 10/10/2019), or if symptoms worsen or fail to improve, for annual exam. or sooner as needed.

## 2019-04-15 ENCOUNTER — PATIENT OUTREACH (OUTPATIENT)
Dept: OTHER | Facility: OTHER | Age: 54
End: 2019-04-15

## 2019-04-15 ENCOUNTER — TELEPHONE (OUTPATIENT)
Dept: FAMILY MEDICINE | Facility: CLINIC | Age: 54
End: 2019-04-15

## 2019-04-15 NOTE — TELEPHONE ENCOUNTER
Spoke with patient.  I advised her that usually someone will call her within 1-2 weeks to schedule the diagnostic mammogram and/or ultrasound.  She will call me back next week if they still have not contacted her. She voiced understanding. She will call me back next week if she still has not heard from them.

## 2019-04-15 NOTE — PROGRESS NOTES
Last 5 Patient Entered Readings                                      Current 30 Day Average: 125/84     Recent Readings 4/9/2019 4/9/2019 4/7/2019 4/7/2019 4/6/2019    SBP (mmHg) 116 116 130 130 122    DBP (mmHg) 83 83 85 85 77    Pulse 77 77 76 76 87          Last 6 Patient Entered Readings                                          Most Recent A1c: 7.1% on 4/8/2019  (Goal: 7%)     Recent Readings 4/15/2019 4/10/2019 3/29/2019 3/20/2019 3/19/2019    Blood Glucose (mg/dL) 135 129 122 131 151        Digital Medicine: Health  Follow Up    Feeling well. Appreciative of Care Team.    Lifestyle Modifications:    1.Dietary Modifications: Trying to be mindful of eating a snack at night before bed.    2.Physical Activity: Hasn't made it to the gym. The day she was planning to start her routine, her  let her know they were going back home from [place], where her new gym is. They will be going back to [place] soon and she hopes to start going to the gym. She is currently staying active with kids, errands.    3.Medication Therapy: Patient has been compliant with the medication regimen.    4.Patient has the following medication side effects/concerns:   (Frequency/Alleviating factors/Precipitating factors, etc.)     Follow up with Mrs. Vijaya Henriquezmary Montenegro completed. No further questions or concerns. Will continue to follow up to achieve health goals.

## 2019-04-15 NOTE — TELEPHONE ENCOUNTER
----- Message from Zenobia Rogers sent at 4/15/2019  9:27 AM CDT -----  Contact: Vijaya [phone]  Type: Patient Call Back    Who called:Vijaya     What is the request in detail: The patient is requesting a call back from the staff. She received a letter in the mail stating that she needs further testing due to a recent mammo she took on April 8. She would like an order put in as soon as possible    Can the clinic reply by [facility]? no    Would the patient rather a call back or a response via [facility]? Call back    Best call back number:[phone]

## 2019-04-22 ENCOUNTER — HOSPITAL ENCOUNTER (OUTPATIENT)
Dept: RADIOLOGY | Facility: HOSPITAL | Age: 54
Discharge: HOME | End: 2019-04-22
Attending: INTERNAL MEDICINE
Payer: COMMERCIAL

## 2019-04-22 DIAGNOSIS — R92.8 ABNORMAL MAMMOGRAM OF LEFT BREAST: ICD-10-CM

## 2019-04-22 PROCEDURE — 76642 ULTRASOUND BREAST LIMITED: CPT | Mod: 26,LT,, | Performed by: RADIOLOGY

## 2019-04-22 PROCEDURE — 76642 US BREAST LEFT LIMITED: ICD-10-PCS | Mod: 26,LT,, | Performed by: RADIOLOGY

## 2019-04-22 PROCEDURE — 77061 BREAST TOMOSYNTHESIS UNI: CPT | Mod: 26,LT,, | Performed by: RADIOLOGY

## 2019-04-22 PROCEDURE — 76642 ULTRASOUND BREAST LIMITED: CPT | Mod: TC,LT

## 2019-04-22 PROCEDURE — 77065 DX MAMMO INCL CAD UNI: CPT | Mod: 26,LT,, | Performed by: RADIOLOGY

## 2019-04-22 PROCEDURE — 77065 DX MAMMO INCL CAD UNI: CPT | Mod: TC,LT

## 2019-04-22 PROCEDURE — 77065 MAMMO DIGITAL DIAGNOSTIC LEFT WITH TOMOSYNTHESIS_CAD: ICD-10-PCS | Mod: 26,LT,, | Performed by: RADIOLOGY

## 2019-04-22 PROCEDURE — 77061 MAMMO DIGITAL DIAGNOSTIC LEFT WITH TOMOSYNTHESIS_CAD: ICD-10-PCS | Mod: 26,LT,, | Performed by: RADIOLOGY

## 2019-05-27 ENCOUNTER — PATIENT OUTREACH (OUTPATIENT)
Dept: OTHER | Facility: OTHER | Age: 54
End: 2019-05-27

## 2019-06-10 NOTE — PROGRESS NOTES
"Last 5 Patient Entered Readings                                      Current 30 Day Average: 112/72     Recent Readings 6/9/2019 6/8/2019 6/8/2019 6/8/2019 6/5/2019    SBP (mmHg) 108 73 85 80 132    DBP (mmHg) 67 47 57 56 78    Pulse 76 93 83 82 80          Last 6 Patient Entered Readings                                          Most Recent A1c: 7.1% on 4/8/2019  (Goal: 7%)     Recent Readings 6/5/2019 5/28/2019 4/23/2019 4/15/2019 4/10/2019    Blood Glucose (mg/dL) 124 133 133 135 129        Digital Medicine: Health  Follow Up    Using a wrist cuff also. Encouraged her to only use [employer] cuff.  Plans to check BG this morning.    Had food poisoning over the weekend, threw up multiple times over the course of two days. Encouraged her to drink a lot of water today and maybe even some Gatorade or Pedialyte. Was able to eat once yesterday but still feeling "aggitated".    Held BP medications due to hypotensive readings on 6/8. Did have symptoms, stated she felt awful. Followed text instructions and did not take medication until BP was above . She checked using wrist cuff and BP was  so she took 1 metoprolol and held the rest. She will check BP again this morning with cuff for HDMP before taking medications. She stated she will call back today if BP drops again.  "

## 2019-06-28 ENCOUNTER — PATIENT OUTREACH (OUTPATIENT)
Dept: OTHER | Facility: OTHER | Age: 54
End: 2019-06-28

## 2019-06-28 NOTE — PROGRESS NOTES
Last 5 Patient Entered Readings                                      Current 30 Day Average: 114/75     Recent Readings 6/27/2019 6/26/2019 6/26/2019 6/25/2019 6/25/2019    SBP (mmHg) 105 106 115 96 121    DBP (mmHg) 65 68 86 63 81    Pulse 85 70 72 80 73        Mrs. Montenegro's BP has been lower recently. She denies feeling any LH/dizziness/weakness/fatigue. Will not reduce medication dose at this time. She will call if she starts to have s/s associated with lower BP readings.     Per 30 day average, 114/75 mmHg, patient's BP is at goal.     Patient's health , Meghann Herrera, will be following up. I will continue to monitor regularly and will follow up in 3-4 months, sooner if BP begins to trend upward or downward.    Asked patient to call or message with questions or concerns.     Current HTN regimen:  Hypertension Medications             hydroCHLOROthiazide (HYDRODIURIL) 25 MG tablet Take 1 tablet (25 mg total) by mouth once daily.    irbesartan (AVAPRO) 150 MG tablet Take 1 tablet (150 mg total) by mouth once daily.    metoprolol tartrate (LOPRESSOR) 50 MG tablet Take 1 tablet (50 mg total) by mouth 3 (three) times daily.            Last 6 Patient Entered Readings                                          Most Recent A1c: 7.1% on 4/8/2019  (Goal: 7%)     Recent Readings 6/26/2019 6/25/2019 6/24/2019 6/21/2019 6/10/2019    Blood Glucose (mg/dL) 125 135 145 144 130        Mrs. Hammond blood sugar readings are stable. She will get an A1C next month.     Patient's health , Meghann Herrera, will be following up. I will continue to monitor regularly and will follow up in 2-3 months, sooner if there are any concerning blood glucose readings.     Asked patient to contact me with any concerns or clinical changes.     Diabetes Medications             metFORMIN (GLUCOPHAGE-XR) 500 MG 24 hr tablet Take 1 tablet (500 mg total) by mouth once daily.

## 2019-07-01 ENCOUNTER — PATIENT OUTREACH (OUTPATIENT)
Dept: OTHER | Facility: OTHER | Age: 54
End: 2019-07-01

## 2019-07-01 DIAGNOSIS — R80.9 TYPE 2 DIABETES MELLITUS WITH PROTEINURIA: Primary | ICD-10-CM

## 2019-07-01 DIAGNOSIS — E11.29 TYPE 2 DIABETES MELLITUS WITH PROTEINURIA: Primary | ICD-10-CM

## 2019-07-01 DIAGNOSIS — I10 ESSENTIAL HYPERTENSION: ICD-10-CM

## 2019-07-01 RX ORDER — IRBESARTAN 150 MG/1
75 TABLET ORAL DAILY
Qty: 90 TABLET | Refills: 2
Start: 2019-07-01 | End: 2020-01-27 | Stop reason: DRUGHIGH

## 2019-07-01 NOTE — PROGRESS NOTES
Last 5 Patient Entered Readings                                      Current 30 Day Average: 114/74     Recent Readings 7/1/2019 7/1/2019 6/30/2019 6/30/2019 6/30/2019    SBP (mmHg) 116 138 110 129 95    DBP (mmHg) 76 90 75 77 58    Pulse 86 89 84 86 83        Mrs. Montenegro's BP continues to drop occasionally. She had some LH/dizziness yesterday. She is concerned that her BP continues to drop. Will reduce irbesartan to 75 mg QD.     Per 30 day average, 114/74 mmHg, patient's BP is not at goal.     Will continue to monitor regularly. Will follow up in 2-3 weeks, sooner if BP begins to trend upward or downward.    Asked patient to call or message with questions or concerns.     Current HTN regimen:  Hypertension Medications             hydroCHLOROthiazide (HYDRODIURIL) 25 MG tablet Take 1 tablet (25 mg total) by mouth once daily.    irbesartan (AVAPRO) 150 MG tablet Take 0.5 tablets (75 mg total) by mouth once daily.    metoprolol tartrate (LOPRESSOR) 50 MG tablet Take 1 tablet (50 mg total) by mouth 3 (three) times daily.                    Last 6 Patient Entered Readings                                          Most Recent A1c: 7.1% on 4/8/2019  (Goal: 7%)     Recent Readings 6/26/2019 6/25/2019 6/24/2019 6/21/2019 6/10/2019    Blood Glucose (mg/dL) 125 135 145 144 130        Blood sugar is stable.     Patient's health , Meghann Herrera, will be following up. I will continue to monitor regularly and will follow up in 1-2 months, sooner if there are any concerning blood glucose readings.     Asked patient to contact me with any concerns or clinical changes.     Diabetes Medications             metFORMIN (GLUCOPHAGE-XR) 500 MG 24 hr tablet Take 1 tablet (500 mg total) by mouth once daily.

## 2019-07-17 ENCOUNTER — PATIENT OUTREACH (OUTPATIENT)
Dept: OTHER | Facility: OTHER | Age: 54
End: 2019-07-17

## 2019-07-17 NOTE — PROGRESS NOTES
Last 5 Patient Entered Readings                                      Current 30 Day Average: 116/77     Recent Readings 7/16/2019 7/15/2019 7/15/2019 7/13/2019 7/10/2019    SBP (mmHg) 112 127 123 110 130    DBP (mmHg) 68 84 87 77 84    Pulse 77 81 94 81 83        Per 30 day average, 116/77 mmHg, patient's BP is at goal.     Mrs. Montenegro's BP is stable on lower dose of irbesartan. She is feeling well and has no questions or concerns at this time.    Patient's health , Meghann Herrera, will be following up. I will continue to monitor regularly and will follow up in 4-6 months, sooner if BP begins to trend upward or downward.    Asked patient to call or message with questions or concerns.     Current HTN regimen:  Hypertension Medications             hydroCHLOROthiazide (HYDRODIURIL) 25 MG tablet Take 1 tablet (25 mg total) by mouth once daily.    irbesartan (AVAPRO) 150 MG tablet Take 0.5 tablets (75 mg total) by mouth once daily.    metoprolol tartrate (LOPRESSOR) 50 MG tablet Take 1 tablet (50 mg total) by mouth 3 (three) times daily.            Last 6 Patient Entered Readings                                          Most Recent A1c: 7.1% on 4/8/2019  (Goal: 7%)     Recent Readings 6/26/2019 6/25/2019 6/24/2019 6/21/2019 6/10/2019    Blood Glucose (mg/dL) 125 135 145 144 130        Did not address on this encounter. No recent BG readings.    Patient's health , Meghann Herrera, will be following up. I will continue to monitor regularly and will follow up in 1-2 months, sooner if there are any concerning blood glucose readings.     Asked patient to contact me with any concerns or clinical changes.     Diabetes Medications             metFORMIN (GLUCOPHAGE-XR) 500 MG 24 hr tablet Take 1 tablet (500 mg total) by mouth once daily.

## 2019-07-22 ENCOUNTER — PATIENT OUTREACH (OUTPATIENT)
Dept: OTHER | Facility: OTHER | Age: 54
End: 2019-07-22

## 2019-07-22 NOTE — PROGRESS NOTES
Last 5 Patient Entered Readings                                      Current 30 Day Average: 114/76     Recent Readings 7/20/2019 7/18/2019 7/17/2019 7/16/2019 7/15/2019    SBP (mmHg) 103 116 109 112 127    DBP (mmHg) 71 79 67 68 84    Pulse 82 85 75 77 81          Last 6 Patient Entered Readings                                          Most Recent A1c: 7.1% on 4/8/2019  (Goal: 7%)     Recent Readings 6/26/2019 6/25/2019 6/24/2019 6/21/2019 6/10/2019    Blood Glucose (mg/dL) 125 135 145 144 130        Digital Medicine: Health  Follow Up    Feeling well.    Still going back and forth to [place] but having less stress. Happier in [place] still.    So far pleased with medication change. Working with Svitlana Crandall.    Not always remembering to check BG. She will keep it on her nightstand next to her phone to check in the mornings.    Lifestyle Modifications:    1.Dietary Modifications: Continuing to work on dietary changes. Sometimes will go out to eat.    2.Physical Activity: Staying active with new friends - going to the gym, walking. Errands, house work, etc.    3.Medication Therapy: Patient has been compliant with the medication regimen.    4.Patient has the following medication side effects/concerns:   (Frequency/Alleviating factors/Precipitating factors, etc.)     Follow up with Mrs. Lilly Herlinda Montenegro completed. No further questions or concerns. Will continue to follow up to achieve health goals.

## 2019-09-09 ENCOUNTER — PATIENT OUTREACH (OUTPATIENT)
Dept: OTHER | Facility: OTHER | Age: 54
End: 2019-09-09

## 2019-10-07 NOTE — PROGRESS NOTES
Digital Medicine: Health  Follow-Up    Feeling well. Some additional family related stress.    The history is provided by the patient.           Diet:   She has the following dietary restrictions: low sodium dietShe cooks for self.    Patient does the shopping for groceries.  She gets groceries from the grocery store.      Intervention(s): carb reduction and reducing processed foods    Assigning the following patient goals: meal plan, maintain low sodium diet and reduce carbs    Physical Activity:   When asked if exercising, patient responded: yes    Patient participates in the following activities: walking and yard/housework    INTERVENTION(S)  encouragement/support    Did not want any resources today.          Topic    Hemoglobin A1C     Urine Protein Check        Last 5 Patient Entered Readings                                      Current 30 Day Average: 117/79     Recent Readings 10/2/2019 9/24/2019 9/15/2019 9/11/2019 9/11/2019    SBP (mmHg) 119 128 110 127 142    DBP (mmHg) 85 82 71 87 86    Pulse 82 78 72 73 76        Last 6 Patient Entered Readings                                          Most Recent A1c: 7.1% on 4/8/2019  (Goal: 7%)     Recent Readings 6/26/2019 6/25/2019 6/24/2019 6/21/2019 6/10/2019    Blood Glucose (mg/dL) 125 135 145 144 130

## 2019-12-30 ENCOUNTER — PATIENT OUTREACH (OUTPATIENT)
Dept: OTHER | Facility: OTHER | Age: 54
End: 2019-12-30

## 2019-12-30 NOTE — PROGRESS NOTES
"Digital Medicine: Health  Follow-Up    Spoke with patient briefly to introduce myself to her as her new health .     She reports that everything is "good". We reviewed that her readings in both programs are within the program goals, although she admits that she doesn't take her blood sugar readings as regularly as she takes her blood pressure readings.           INTERVENTION(S)  denied questions          Topic    Hemoglobin A1C     Urine Protein Check        Last 5 Patient Entered Readings                                      Current 30 Day Average: 114/77     Recent Readings 12/29/2019 12/26/2019 12/26/2019 12/17/2019 12/15/2019    SBP (mmHg) 114 107 149 98 117    DBP (mmHg) 78 75 91 61 69    Pulse 87 82 76 71 69        Last 6 Patient Entered Readings                                          Most Recent A1c: 7.1% on 4/8/2019  (Goal: 7%)     Recent Readings 12/18/2019 6/26/2019 6/25/2019 6/24/2019 6/21/2019    Blood Glucose (mg/dL) 124 125 135 145 144                Screenings    SDOH  "

## 2020-01-21 ENCOUNTER — TELEPHONE (OUTPATIENT)
Dept: FAMILY MEDICINE | Facility: CLINIC | Age: 55
End: 2020-01-21

## 2020-01-21 DIAGNOSIS — R80.9 TYPE 2 DIABETES MELLITUS WITH PROTEINURIA: Primary | ICD-10-CM

## 2020-01-21 DIAGNOSIS — E11.29 TYPE 2 DIABETES MELLITUS WITH PROTEINURIA: Primary | ICD-10-CM

## 2020-01-21 NOTE — TELEPHONE ENCOUNTER
----- Message from Allison Salazar sent at 1/21/2020  8:16 AM CST -----  Contact: Patient [phone]  Type: Lab    Caller is requesting to schedule their Lab appointment prior to annual appointment.    Order is not listed in EPIC.  Please enter order and contact patient to schedule.    Name of Caller: Patient    Preferred Date and Time of Labs: Before appointment    Date of \[facility] [place] [facility]\"" Appointment: 02-24-20    Where would they like the lab performed? [place]    Would the patient rather a call back or a response via [facility]? Call back    Best Call Back Number: [phone]

## 2020-01-21 NOTE — TELEPHONE ENCOUNTER
Spoke with patient and she will be out of medication next week. Patient is also over due for A1c. Schedule A1c for 1/22/2020 and office visit for 1/28/2020.

## 2020-01-21 NOTE — TELEPHONE ENCOUNTER
----- Message from Allison Salazar sent at 1/21/2020  8:17 AM CST -----  Contact: Maribel  [phone]  Type:  Sooner Appointment Request    Patient is requesting a sooner appointment.  Patient scheduled first available appointment. Patient will accept being placed on the waitlist and is requesting a message be sent to doctor.    Name of Caller: Patient    When is the first available appointment? 02-24-20    Symptoms: Check up and refill on meds. Also lab work    Would the patient rather a call back or a response via [facility]? Call back    Best Call Back Number: [phone]    Additional Information: Patient states that she's on her last refill on medication. Will need more medication before appointment.

## 2020-01-22 ENCOUNTER — LAB VISIT (OUTPATIENT)
Dept: LAB | Facility: HOSPITAL | Age: 55
End: 2020-01-22
Attending: INTERNAL MEDICINE
Payer: COMMERCIAL

## 2020-01-22 DIAGNOSIS — E11.29 TYPE 2 DIABETES MELLITUS WITH PROTEINURIA: ICD-10-CM

## 2020-01-22 DIAGNOSIS — R80.9 TYPE 2 DIABETES MELLITUS WITH PROTEINURIA: ICD-10-CM

## 2020-01-22 LAB
ALBUMIN SERPL BCP-MCNC: 3.9 G/DL (ref 3.5–5.2)
ALP SERPL-CCNC: 95 U/L (ref 55–135)
ALT SERPL W/O P-5'-P-CCNC: 15 U/L (ref 10–44)
ANION GAP SERPL CALC-SCNC: 9 MMOL/L (ref 8–16)
AST SERPL-CCNC: 15 U/L (ref 10–40)
BASOPHILS # BLD AUTO: 0.03 K/UL (ref 0–0.2)
BASOPHILS NFR BLD: 0.3 % (ref 0–1.9)
BILIRUB SERPL-MCNC: 0.5 MG/DL (ref 0.1–1)
BUN SERPL-MCNC: 18 MG/DL (ref 6–20)
CALCIUM SERPL-MCNC: 9.9 MG/DL (ref 8.7–10.5)
CHLORIDE SERPL-SCNC: 98 MMOL/L (ref 95–110)
CHOLEST SERPL-MCNC: 202 MG/DL (ref 120–199)
CHOLEST/HDLC SERPL: 4 {RATIO} (ref 2–5)
CO2 SERPL-SCNC: 30 MMOL/L (ref 23–29)
CREAT SERPL-MCNC: 1 MG/DL (ref 0.5–1.4)
DIFFERENTIAL METHOD: ABNORMAL
EOSINOPHIL # BLD AUTO: 0.2 K/UL (ref 0–0.5)
EOSINOPHIL NFR BLD: 1.6 % (ref 0–8)
ERYTHROCYTE [DISTWIDTH] IN BLOOD BY AUTOMATED COUNT: 12.4 % (ref 11.5–14.5)
EST. GFR  (AFRICAN AMERICAN): >60 ML/MIN/1.73 M^2
EST. GFR  (NON AFRICAN AMERICAN): >60 ML/MIN/1.73 M^2
ESTIMATED AVG GLUCOSE: 148 MG/DL (ref 68–131)
GLUCOSE SERPL-MCNC: 247 MG/DL (ref 70–110)
HBA1C MFR BLD HPLC: 6.8 % (ref 4–5.6)
HCT VFR BLD AUTO: 43.3 % (ref 37–48.5)
HDLC SERPL-MCNC: 50 MG/DL (ref 40–75)
HDLC SERPL: 24.8 % (ref 20–50)
HGB BLD-MCNC: 13.4 G/DL (ref 12–16)
IMM GRANULOCYTES # BLD AUTO: 0.03 K/UL (ref 0–0.04)
IMM GRANULOCYTES NFR BLD AUTO: 0.3 % (ref 0–0.5)
LDLC SERPL CALC-MCNC: 110.6 MG/DL (ref 63–159)
LYMPHOCYTES # BLD AUTO: 4 K/UL (ref 1–4.8)
LYMPHOCYTES NFR BLD: 43.3 % (ref 18–48)
MCH RBC QN AUTO: 30 PG (ref 27–31)
MCHC RBC AUTO-ENTMCNC: 30.9 G/DL (ref 32–36)
MCV RBC AUTO: 97 FL (ref 82–98)
MONOCYTES # BLD AUTO: 0.5 K/UL (ref 0.3–1)
MONOCYTES NFR BLD: 5.4 % (ref 4–15)
NEUTROPHILS # BLD AUTO: 4.6 K/UL (ref 1.8–7.7)
NEUTROPHILS NFR BLD: 49.1 % (ref 38–73)
NONHDLC SERPL-MCNC: 152 MG/DL
NRBC BLD-RTO: 0 /100 WBC
PLATELET # BLD AUTO: 257 K/UL (ref 150–350)
PMV BLD AUTO: 10.8 FL (ref 9.2–12.9)
POTASSIUM SERPL-SCNC: 3.6 MMOL/L (ref 3.5–5.1)
PROT SERPL-MCNC: 7.7 G/DL (ref 6–8.4)
RBC # BLD AUTO: 4.47 M/UL (ref 4–5.4)
SODIUM SERPL-SCNC: 137 MMOL/L (ref 136–145)
TRIGL SERPL-MCNC: 207 MG/DL (ref 30–150)
WBC # BLD AUTO: 9.31 K/UL (ref 3.9–12.7)

## 2020-01-22 PROCEDURE — 80061 LIPID PANEL: CPT

## 2020-01-22 PROCEDURE — 85025 COMPLETE CBC W/AUTO DIFF WBC: CPT

## 2020-01-22 PROCEDURE — 36415 COLL VENOUS BLD VENIPUNCTURE: CPT | Mod: PO

## 2020-01-22 PROCEDURE — 80053 COMPREHEN METABOLIC PANEL: CPT

## 2020-01-22 PROCEDURE — 83036 HEMOGLOBIN GLYCOSYLATED A1C: CPT

## 2020-01-23 DIAGNOSIS — E11.29 TYPE 2 DIABETES MELLITUS WITH PROTEINURIA: Primary | ICD-10-CM

## 2020-01-23 DIAGNOSIS — R80.9 TYPE 2 DIABETES MELLITUS WITH PROTEINURIA: Primary | ICD-10-CM

## 2020-01-27 ENCOUNTER — LAB VISIT (OUTPATIENT)
Dept: LAB | Facility: HOSPITAL | Age: 55
End: 2020-01-27
Attending: INTERNAL MEDICINE
Payer: COMMERCIAL

## 2020-01-27 ENCOUNTER — OFFICE VISIT (OUTPATIENT)
Dept: FAMILY MEDICINE | Facility: CLINIC | Age: 55
End: 2020-01-27
Payer: COMMERCIAL

## 2020-01-27 VITALS
BODY MASS INDEX: 29.84 KG/M2 | HEIGHT: 61 IN | OXYGEN SATURATION: 96 % | HEART RATE: 68 BPM | WEIGHT: 158.06 LBS | SYSTOLIC BLOOD PRESSURE: 120 MMHG | DIASTOLIC BLOOD PRESSURE: 68 MMHG | TEMPERATURE: 98 F

## 2020-01-27 DIAGNOSIS — E11.29 TYPE 2 DIABETES MELLITUS WITH PROTEINURIA: ICD-10-CM

## 2020-01-27 DIAGNOSIS — E11.29 TYPE 2 DIABETES MELLITUS WITH PROTEINURIA: Primary | ICD-10-CM

## 2020-01-27 DIAGNOSIS — R80.9 TYPE 2 DIABETES MELLITUS WITH PROTEINURIA: Primary | ICD-10-CM

## 2020-01-27 DIAGNOSIS — R80.9 TYPE 2 DIABETES MELLITUS WITH PROTEINURIA: ICD-10-CM

## 2020-01-27 DIAGNOSIS — E78.5 HYPERLIPIDEMIA WITH TARGET LDL LESS THAN 100: ICD-10-CM

## 2020-01-27 DIAGNOSIS — I10 ESSENTIAL HYPERTENSION: ICD-10-CM

## 2020-01-27 DIAGNOSIS — K21.9 GASTROESOPHAGEAL REFLUX DISEASE, ESOPHAGITIS PRESENCE NOT SPECIFIED: ICD-10-CM

## 2020-01-27 DIAGNOSIS — F41.9 ANXIETY: ICD-10-CM

## 2020-01-27 LAB
ALBUMIN/CREAT UR: 22.2 UG/MG (ref 0–30)
CREAT UR-MCNC: 63 MG/DL (ref 15–325)
MICROALBUMIN UR DL<=1MG/L-MCNC: 14 UG/ML

## 2020-01-27 PROCEDURE — 3044F PR MOST RECENT HEMOGLOBIN A1C LEVEL <7.0%: ICD-10-PCS | Mod: CPTII,S$GLB,, | Performed by: PHYSICIAN ASSISTANT

## 2020-01-27 PROCEDURE — 3074F SYST BP LT 130 MM HG: CPT | Mod: CPTII,S$GLB,, | Performed by: PHYSICIAN ASSISTANT

## 2020-01-27 PROCEDURE — 99999 PR PBB SHADOW E&M-EST. PATIENT-LVL III: ICD-10-PCS | Mod: PBBFAC,,, | Performed by: PHYSICIAN ASSISTANT

## 2020-01-27 PROCEDURE — 3074F PR MOST RECENT SYSTOLIC BLOOD PRESSURE < 130 MM HG: ICD-10-PCS | Mod: CPTII,S$GLB,, | Performed by: PHYSICIAN ASSISTANT

## 2020-01-27 PROCEDURE — 3078F PR MOST RECENT DIASTOLIC BLOOD PRESSURE < 80 MM HG: ICD-10-PCS | Mod: CPTII,S$GLB,, | Performed by: PHYSICIAN ASSISTANT

## 2020-01-27 PROCEDURE — 3008F PR BODY MASS INDEX (BMI) DOCUMENTED: ICD-10-PCS | Mod: CPTII,S$GLB,, | Performed by: PHYSICIAN ASSISTANT

## 2020-01-27 PROCEDURE — 82043 UR ALBUMIN QUANTITATIVE: CPT

## 2020-01-27 PROCEDURE — 99214 OFFICE O/P EST MOD 30 MIN: CPT | Mod: S$GLB,,, | Performed by: PHYSICIAN ASSISTANT

## 2020-01-27 PROCEDURE — 99214 PR OFFICE/OUTPT VISIT, EST, LEVL IV, 30-39 MIN: ICD-10-PCS | Mod: S$GLB,,, | Performed by: PHYSICIAN ASSISTANT

## 2020-01-27 PROCEDURE — 99999 PR PBB SHADOW E&M-EST. PATIENT-LVL III: CPT | Mod: PBBFAC,,, | Performed by: PHYSICIAN ASSISTANT

## 2020-01-27 PROCEDURE — 3078F DIAST BP <80 MM HG: CPT | Mod: CPTII,S$GLB,, | Performed by: PHYSICIAN ASSISTANT

## 2020-01-27 PROCEDURE — 3044F HG A1C LEVEL LT 7.0%: CPT | Mod: CPTII,S$GLB,, | Performed by: PHYSICIAN ASSISTANT

## 2020-01-27 PROCEDURE — 3008F BODY MASS INDEX DOCD: CPT | Mod: CPTII,S$GLB,, | Performed by: PHYSICIAN ASSISTANT

## 2020-01-27 RX ORDER — METOPROLOL TARTRATE 50 MG/1
50 TABLET ORAL 3 TIMES DAILY
Qty: 270 TABLET | Refills: 2 | Status: SHIPPED | OUTPATIENT
Start: 2020-01-27 | End: 2020-06-01 | Stop reason: SDUPTHER

## 2020-01-27 RX ORDER — NORGESTIMATE AND ETHINYL ESTRADIOL 0.25-0.035
1 KIT ORAL DAILY
Qty: 30 TABLET | Refills: 0 | Status: CANCELLED | OUTPATIENT
Start: 2020-01-27

## 2020-01-27 RX ORDER — IRBESARTAN 75 MG/1
75 TABLET ORAL NIGHTLY
Qty: 90 TABLET | Refills: 2 | Status: SHIPPED | OUTPATIENT
Start: 2020-01-27 | End: 2020-06-01 | Stop reason: SDUPTHER

## 2020-01-27 RX ORDER — HYDROCHLOROTHIAZIDE 25 MG/1
25 TABLET ORAL DAILY
Qty: 90 TABLET | Refills: 2 | Status: SHIPPED | OUTPATIENT
Start: 2020-01-27 | End: 2020-06-01 | Stop reason: SDUPTHER

## 2020-01-27 RX ORDER — PANTOPRAZOLE SODIUM 40 MG/1
40 TABLET, DELAYED RELEASE ORAL DAILY PRN
Qty: 30 TABLET | Refills: 5 | Status: CANCELLED | OUTPATIENT
Start: 2020-01-27

## 2020-01-27 RX ORDER — IRBESARTAN 150 MG/1
75 TABLET ORAL DAILY
Qty: 90 TABLET | Refills: 2 | Status: CANCELLED
Start: 2020-01-27

## 2020-01-27 RX ORDER — PRAVASTATIN SODIUM 20 MG/1
20 TABLET ORAL DAILY
Qty: 90 TABLET | Refills: 2 | Status: SHIPPED | OUTPATIENT
Start: 2020-01-27 | End: 2020-06-01 | Stop reason: ALTCHOICE

## 2020-01-27 RX ORDER — METFORMIN HYDROCHLORIDE 500 MG/1
500 TABLET, EXTENDED RELEASE ORAL DAILY
Qty: 90 TABLET | Refills: 2 | Status: SHIPPED | OUTPATIENT
Start: 2020-01-27 | End: 2020-10-16 | Stop reason: SDUPTHER

## 2020-01-27 NOTE — PATIENT INSTRUCTIONS
Prevention Guidelines, Women Ages 50 to 64  Screening tests and vaccines are an important part of managing your health. Health counseling is essential, too. Below are guidelines for these, for women ages 50 to 64. Talk with your healthcare provider to make sure youre up to date on what you need.  Screening Who needs it How often   Type 2 diabetes or prediabetes All adults beginning at age 45 and adults without symptoms at any age who are overweight or obese and have 1 or more additional risk factors for diabetes. At  least every 3 years   Alcohol misuse All women in this age group At routine exams   Blood pressure All women in this age group Every 2 years if your blood pressure is less than 120/80 mm Hg; yearly if your systolic blood pressure is 120 to 139 mm Hg, or your diastolic blood pressure reading is 80 to 89 mm Hg   Breast cancer All women in this age group Yearly mammogram and clinical breast exam1   Cervical cancer All women in this age group, except women who have had a complete hysterectomy Pap test every 3 years or Pap test with human papillomavirus (HPV) test every 5 years   Chlamydia Women at increased risk for infection At routine exams   Colorectal cancer All women in this age group Flexible sigmoidoscopy every 5 years, or colonoscopy every 10 years, or double-contrast barium enema every 5 years; yearly fecal occult blood test or fecal immunochemical test; or a stool DNA test as often as your health care provider advises; talk with your health care provider about which tests are best for you   Depression All women in this age group At routine exams   Gonorrhea Sexually active women at increased risk for infection At routine exams   Hepatitis C Anyone at increased risk; 1 time for those born between 1945 and 1965 At routine exams   High cholesterol or triglycerides All women in this age group who are at risk for coronary artery disease At least every 5 years   HIV All women At routine exams   Lung  cancer Adults age 55 to 80 who have smoked Yearly screening in smokers with 30 pack-year history of smoking or who quit within 15 years   Obesity All women in this age group At routine exams   Osteoporosis Women who are postmenopausal Ask your healthcare provider   Syphilis Women at increased risk for infection - talk with your healthcare provider At routine exams   Tuberculosis Women at increased risk for infection - talk with your healthcare provider Ask your healthcare provider   Vision All women in this age group Ask your healthcare provider   Vaccine Who needs it How often   Chickenpox (varicella) All women in this age group who have no record of this infection or vaccine 2 doses; the second dose should be given at least 4 weeks after the first dose   Hepatitis A Women at increased risk for infection - talk with your healthcare provider 2 doses given at least 6 months apart   Hepatitis B Women at increased risk for infection - talk with your healthcare provider 3 doses over 6 months; second dose should be given 1 month after the first dose; the third dose should be given at least 2 months after the second dose and at least 4 months after the first dose   Haemophilus influenzaeType B (HIB) Women at increased risk for infection - talk with your healthcare provider 1 to 3 doses   Influenza (flu) All women in this age group Once a year   Measles, mumps, rubella (MMR) Women in this age group through their late 50s who have no record of these infections or vaccines 1 dose   Meningococcal Women at increased risk for infection - talk with your healthcare provider 1 or more doses   Pneumococcal conjugate vaccine (PCV13) and pneumococcal polysaccharide vaccine (PPSV23) Women at increased risk for infection - talk with your healthcare provider PCV13: 1 dose ages 19 to 65 (protects against 13 types of pneumococcal bacteria)  PPSV23: 1 to 2 doses through age 64, or 1 dose at 65 or older (protects against 23 types of  pneumococcal bacteria)   Tetanus/diphtheria/pertussis (Td/Tdap) booster All women in this age group Td every 10 years, or a one-time dose of Tdap instead of a Td booster after age 18, then Td every 10 years   Zoster All women ages 60 and older 1 dose   Counseling Who needs it How often   BRCA gene mutation testing for breast and ovarian cancer susceptibility Women with increased risk for having gene mutation When your risk is known   Breast cancer and chemoprevention Women at high risk for breast cancer When your risk is known   Diet and exercise Women who are overweight or obese When diagnosed, and then at routine exams   Sexually transmitted infection prevention Women at increased risk for infection - talk with your healthcare provider At routine exams   Use of daily aspirin Women ages 55 and up in this age group who are at risk for cardiovascular health problems such as stroke When your risk is known   Use of tobacco and the health effects it can cause All women in this age group Every exam   1American [employer]  Date Last Reviewed: 1/26/2016  © 5684-9778 [employer] [address] [place] [address], [place] [address]. All rights reserved. This information is not intended as a substitute for professional medical care. Always follow your healthcare professional's instructions.

## 2020-01-27 NOTE — PROGRESS NOTES
Patient Name: Vijaya Montenegro    : 1965  MRN: [id number]    Subjective:  Vijaya is a 54 y.o. female who presents today for:    Chief Complaint   Patient presents with    diabetes follow up       HPI  Patient has multiple medical diagnoses as listed below in the history. Patient is new to me, but known to the clinic.  She presents for diabetes follow and medication refill. LOV 4/10/19. Reviewed recent lab work with patient. A1c has improved to 6.8. She is compliant with metformin 500 mg daily. She is mindful of diet. She has started regular exercise routine with her . Cholesterol has been controlled with 20 mg pravastatin. She is enrolled in digital HTN program with good effect. With regard to health maintenance, she needs urine microalbumin and foot exam today. No active anxiety or GERD symptoms. She has no acute complaints today and is otherwise healthy.     Past Medical History  Past Medical History:   Diagnosis Date    Anxiety     GERD (gastroesophageal reflux disease)     Gout, arthritis     Hyperlipidemia LDL goal < 100     Hypertension     Obesity     Type II or unspecified type diabetes mellitus without mention of complication, not stated as uncontrolled     diet controlled       Past Surgical History  Past Surgical History:   Procedure Laterality Date     SECTION, LOW TRANSVERSE      x2    REFRACTIVE SURGERY Bilateral     [facility]       Family History  Family History   Problem Relation Age of Onset    Hypertension Father     Bladder Cancer Father     Cancer Father         bladder    Diabetes Sister     Heart attack Maternal Grandmother     Cataracts Maternal Grandmother     COPD Mother     Blindness Mother         left eye; eye injury    Thyroid disease Mother     No Known Problems Maternal Grandfather     Diabetes Paternal Grandmother     No Known Problems Paternal Grandfather     Diabetes Paternal Aunt     Diabetes Paternal Aunt     No Known  Problems Maternal Aunt     No Known Problems Maternal Uncle     No Known Problems Paternal Uncle     Breast cancer Maternal Cousin     Colon cancer Neg Hx     Amblyopia Neg Hx     Glaucoma Neg Hx     Macular degeneration Neg Hx     Retinal detachment Neg Hx     Strabismus Neg Hx     Stroke Neg Hx        Social History  Social History     Socioeconomic History    Marital status:      Spouse name: Not on file    Number of children: 2    Years of education: Not on file    Highest education level: Not on file   Occupational History    Not on file   Social Needs    Financial resource strain: Not on file    Food insecurity:     Worry: Not on file     Inability: Not on file    Transportation needs:     Medical: Not on file     Non-medical: Not on file   Tobacco Use    Smoking status: Never Smoker    Smokeless tobacco: Never Used   Substance and Sexual Activity    Alcohol use: Yes    Drug use: No    Sexual activity: Yes     Partners: Male   Lifestyle    Physical activity:     Days per week: Not on file     Minutes per session: Not on file    Stress: Not on file   Relationships    Social connections:     Talks on phone: Not on file     Gets together: Not on file     Attends Spiritism service: Not on file     Active member of club or organization: Not on file     Attends meetings of clubs or organizations: Not on file     Relationship status: Not on file   Other Topics Concern    Not on file   Social History Narrative    Not on file       Current Medications  Current Outpatient Medications on File Prior to Visit   Medication Sig Dispense Refill    blood sugar diagnostic Strp Test one time daily. 100 strip 5    lancets 31 gauge Misc 1 lancet by Misc.(Non-Drug; Combo Route) route 2 (two) times daily. Test  each 5    pantoprazole (PROTONIX) 40 MG tablet Take 1 tablet (40 mg total) by mouth daily as needed (heartburn). Take only as needed. 30 tablet 5    [DISCONTINUED]  "hydroCHLOROthiazide (HYDRODIURIL) 25 MG tablet Take 1 tablet (25 mg total) by mouth once daily. 90 tablet 2    [DISCONTINUED] irbesartan (AVAPRO) 150 MG tablet Take 0.5 tablets (75 mg total) by mouth once daily. 90 tablet 2    [DISCONTINUED] metFORMIN (GLUCOPHAGE-XR) 500 MG 24 hr tablet Take 1 tablet (500 mg total) by mouth once daily. 90 tablet 2    [DISCONTINUED] metoprolol tartrate (LOPRESSOR) 50 MG tablet Take 1 tablet (50 mg total) by mouth 3 (three) times daily. 270 tablet 2    [DISCONTINUED] pravastatin (PRAVACHOL) 20 MG tablet Take 1 tablet (20 mg total) by mouth once daily. 90 tablet 2    ALPRAZolam (XANAX) 0.25 MG tablet Take 1 tablet (0.25 mg total) by mouth daily as needed for Anxiety. 30 tablet 0    PREVIFEM 0.25-35 mg-mcg per tablet Take 1 tablet by mouth once daily. 30 tablet 0     No current facility-administered medications on file prior to visit.        Allergies   Review of patient's allergies indicates:   Allergen Reactions    Codeine Other (See Comments)         ROS  Review of Systems   Constitutional: Negative for chills, fatigue and fever.   Respiratory: Negative for cough and shortness of breath.    Cardiovascular: Negative for chest pain and palpitations.   Gastrointestinal: Negative for abdominal pain and nausea.   Endocrine: Negative for polyuria.   Musculoskeletal: Negative for arthralgias and myalgias.   Skin: Negative for rash.   Neurological: Negative for light-headedness and headaches.   Hematological: Negative for adenopathy.   Psychiatric/Behavioral: Negative for sleep disturbance. The patient is not nervous/anxious.          Objective:    /68 (BP Location: Right arm, Patient Position: Sitting, BP Method: Medium (Manual))   Pulse 68   Temp 98 °F (36.7 °C) (Oral)   Ht 5' 1" (1.549 m)   Wt 71.7 kg (158 lb 1.1 oz)   SpO2 96%   BMI 29.87 kg/m²     Physical Exam   Constitutional: Vital signs are normal.   HENT:   Head: Normocephalic.   Eyes: Pupils are equal, round, " and reactive to light. EOM are normal.   Neck: Carotid bruit is not present.   Cardiovascular: Normal rate, regular rhythm, S1 normal and S2 normal.   Pulmonary/Chest: Effort normal and breath sounds normal. She has no wheezes.   Abdominal: Soft. Normal appearance and bowel sounds are normal. There is no tenderness.   Musculoskeletal:        Right lower leg: She exhibits no edema.        Left lower leg: She exhibits no edema.        Right foot: There is normal range of motion and no deformity.        Left foot: There is normal range of motion and no deformity.   Feet:   Right Foot:   Protective Sensation: 10 sites tested. 10 sites sensed.   Skin Integrity: Negative for ulcer, blister, skin breakdown, erythema, warmth, callus or dry skin.   Left Foot:   Protective Sensation: 10 sites tested. 10 sites sensed.   Skin Integrity: Negative for ulcer, blister, skin breakdown, erythema, warmth, callus or dry skin.   Lymphadenopathy:     She has no cervical adenopathy.        Right: No supraclavicular adenopathy present.        Left: No supraclavicular adenopathy present.   Neurological: She is alert.   Skin: Skin is warm, dry and intact. No rash noted.   Psychiatric: She has a normal mood and affect. Judgment normal.     Protective Sensation (w/ 10 gram monofilament):  Right: Intact  Left: Intact    Visual Inspection:  Normal -  Right     Pedal Pulses:   Right: Present  Left: Present    Posterior tibialis:   Right:Present  Left: Present        Assessment/Plan:  Vijaya Montenegro is a 54 y.o. female who presents today for :    Vijaya was seen today for diabetes follow up.    Diagnoses and all orders for this visit:    Type 2 diabetes mellitus with proteinuria  -     metFORMIN (GLUCOPHAGE-XR) 500 MG 24 hr tablet; Take 1 tablet (500 mg total) by mouth once daily.  As below    Hyperlipidemia with target LDL less than 100  -     pravastatin (PRAVACHOL) 20 MG tablet; Take 1 tablet (20 mg total) by mouth once daily.  As  below    Essential hypertension  -     metoprolol tartrate (LOPRESSOR) 50 MG tablet; Take 1 tablet (50 mg total) by mouth 3 (three) times daily.  -     hydroCHLOROthiazide (HYDRODIURIL) 25 MG tablet; Take 1 tablet (25 mg total) by mouth once daily.  -     irbesartan (AVAPRO) 75 MG tablet; Take 1 tablet (75 mg total) by mouth every evening.  As below      Problem list issues addressed during this visit    Anxiety  Stable, no active symptoms    Hyperlipidemia with target LDL less than 100  Discussed diet/physical activity for maintenance of overall fitness and better cholesterol management.   Continue current treatment plan      Hypertension  BP controlled presently - reviewed anti-hypertensive regimen - continue current therapy      Type 2 diabetes mellitus with proteinuria  Stable, chronic as reviewed in record, controlled with medication   followed by PCP  Continue current treatment plan      GERD (gastroesophageal reflux disease)  Stable, no active symptoms  Continue with diet modifications        Health maintenance reviewed and discussed, urine specimen collected. Foot exam completed as per physical exam.      I spent >50% of this 25 minute encounter counseling the patient on diagnoses, risk factors, and treatments.    Follow up in about 6 months (around 7/27/2020) for chronic conditions with PCP.    Nubia Moya PA-C  [facility] Family [facility]/ Internal Med

## 2020-01-27 NOTE — ASSESSMENT & PLAN NOTE
Discussed diet/physical activity for maintenance of overall fitness and better cholesterol management.   Continue current treatment plan

## 2020-02-11 ENCOUNTER — PATIENT OUTREACH (OUTPATIENT)
Dept: OTHER | Facility: OTHER | Age: 55
End: 2020-02-11

## 2020-02-26 LAB
HPV, HIGH-RISK: NOT DETECTED
PAP RECOMMENDATION EXT: NORMAL
PAP SMEAR: NORMAL

## 2020-02-27 ENCOUNTER — PATIENT OUTREACH (OUTPATIENT)
Dept: OTHER | Facility: OTHER | Age: 55
End: 2020-02-27

## 2020-02-27 ENCOUNTER — PATIENT MESSAGE (OUTPATIENT)
Dept: FAMILY MEDICINE | Facility: CLINIC | Age: 55
End: 2020-02-27

## 2020-03-05 ENCOUNTER — TELEPHONE (OUTPATIENT)
Dept: FAMILY MEDICINE | Facility: CLINIC | Age: 55
End: 2020-03-05

## 2020-03-05 NOTE — TELEPHONE ENCOUNTER
Spoke with the patient and she didn't want to reschedule her appt.  .Patient verbalized understandings.

## 2020-03-11 NOTE — PROGRESS NOTES
Digital Medicine: Clinician Follow-Up    Called patient for hypertension and diabetes follow-up. Ms. Lilly reports that things have been hectic over the past few weeks due to the death of her mother. She attributes this to the reason why she has not been able to measure her blood pressure as frequently. She is now receiving her diabetes supplies from her insurance for free so she is no longer using the [employer] glucometer to monitor her blood glucose. She reports that her before breakfast glucose is usually around 140. She is interested in remaining in the diabetes program but would like to use her other glucometer and supplies due to cost. She reports complaince to both regimens.     The history is provided by the patient. No  was used.     Follow Up  Follow-up reason(s): reading review      Readings are missing.       INTERVENTION(S)  encouragement/support    PLAN  patient verbalizes understanding and continue monitoring    Hypertension:  Current 30-day BP avg of 128/81 is close to goal of <130/80.  Reviewed readings: SBP/DBP is trending downwards where both are intermittently controlled.    Continue current regimen.    Diabetes:  Last A1c from 1/2020 of 6.8% meets goal of <7%.  No readings to review - no BG readings submitted since 12/2019.  Since patient is interested in remaining in the program, she will attempt to enter BG data from her other glucometer into the diabetes [employer] [facility] for monitoring. Explained to patient that if BG data could not be entered and submitted that she can be discharged from diabetes program but still remain in hypertension program for monitoring. Patient expressed understanding.     Continue current regimen.    Follow-up in 12 weeks.           Topic    Eye Exam        Last 5 Patient Entered Readings                                      Current 30 Day Average: 128/81     Recent Readings 3/8/2020 3/2/2020 3/1/2020 2/27/2020 2/19/2020    SBP (mmHg) 124 137 125 116 131     DBP (mmHg) 83 91 79 77 74    Pulse 87 77 89 92 70        Last 6 Patient Entered Readings                                          Most Recent A1c: 6.8% on 1/22/2020  (Goal: 7%)     Recent Readings 12/18/2019 6/26/2019 6/25/2019 6/24/2019 6/21/2019    Blood Glucose (mg/dL) 124 125 135 145 144           Hypertension Medications             hydroCHLOROthiazide (HYDRODIURIL) 25 MG tablet Take 1 tablet (25 mg total) by mouth once daily.    irbesartan (AVAPRO) 75 MG tablet Take 1 tablet (75 mg total) by mouth every evening.    metoprolol tartrate (LOPRESSOR) 50 MG tablet Take 1 tablet (50 mg total) by mouth 3 (three) times daily.        Diabetes Medications             metFORMIN (GLUCOPHAGE-XR) 500 MG 24 hr tablet Take 1 tablet (500 mg total) by mouth once daily.               Screenings

## 2020-03-30 NOTE — PROGRESS NOTES
Digital Medicine: Health  Follow-Up    Patient discussed that her physician recommends that she only test her blood sugar in the morning, in a fasting state. She asked if she should test more often, including after meals. We discussed that generally the physician, working with the digital medicine clinician and the patient, determines the most appropriate frequency and timing of glucometer readings, but that if she starts testing after meals she will have a better understanding of how her blood sugar fluctuates.     We reviewed that should she continue to manually input her glucometer readings that we may not as quickly reach out to her if she has blood sugar readings that are 70 and under.     We reviewed the goals for A1C and for blood pressure in the digital medicine program.           INTERVENTION(S)  reviewed monitoring technique and encouragement/support          Topic    Eye Exam        Last 5 Patient Entered Readings                                      Current 30 Day Average: 125/81     Recent Readings 3/29/2020 3/27/2020 3/25/2020 3/16/2020 3/13/2020    SBP (mmHg) 108 138 125 127 141    DBP (mmHg) 78 85 83 80 88    Pulse 70 91 66 71 70        Last 6 Patient Entered Readings                                          Most Recent A1c: 6.8% on 1/22/2020  (Goal: 7%)     Recent Readings 3/24/2020 3/23/2020 3/17/2020 3/13/2020 12/18/2019    Blood Glucose (mg/dL) 142 150 130 126 124                    Diet Screening       Patient reports eating out less, eating smaller portions, and eating asparagus. She has recently lost 20 pounds.     Physical Activity Screening       Patient has been walking her three dogs, separately, throughout the day. She discussed that she wants to walk more.       SDOH

## 2020-05-11 ENCOUNTER — PATIENT OUTREACH (OUTPATIENT)
Dept: OTHER | Facility: OTHER | Age: 55
End: 2020-05-11

## 2020-05-18 ENCOUNTER — PATIENT OUTREACH (OUTPATIENT)
Dept: ADMINISTRATIVE | Facility: HOSPITAL | Age: 55
End: 2020-05-18

## 2020-05-28 ENCOUNTER — TELEPHONE (OUTPATIENT)
Dept: FAMILY MEDICINE | Facility: CLINIC | Age: 55
End: 2020-05-28

## 2020-05-28 NOTE — TELEPHONE ENCOUNTER
----- Message from Elva Cortes sent at 5/28/2020 10:16 AM CDT -----  Contact: LYLE DUFF [[id number]]  Name of Who is Calling: LYLE DUFF [[id number]]    What is the request in detail: Patient is requesting orders for mammogram. Please contact to further discuss and advise      Can the clinic reply by [facility]: no    What Number to Call Back if not in [facility]: [phone]

## 2020-06-01 ENCOUNTER — PATIENT OUTREACH (OUTPATIENT)
Dept: ADMINISTRATIVE | Facility: OTHER | Age: 55
End: 2020-06-01

## 2020-06-01 ENCOUNTER — PATIENT MESSAGE (OUTPATIENT)
Dept: FAMILY MEDICINE | Facility: CLINIC | Age: 55
End: 2020-06-01

## 2020-06-01 ENCOUNTER — HOSPITAL ENCOUNTER (OUTPATIENT)
Dept: RADIOLOGY | Facility: HOSPITAL | Age: 55
Discharge: HOME | End: 2020-06-01
Attending: INTERNAL MEDICINE
Payer: COMMERCIAL

## 2020-06-01 ENCOUNTER — OFFICE VISIT (OUTPATIENT)
Dept: FAMILY MEDICINE | Facility: CLINIC | Age: 55
End: 2020-06-01
Payer: COMMERCIAL

## 2020-06-01 VITALS
DIASTOLIC BLOOD PRESSURE: 78 MMHG | WEIGHT: 167 LBS | OXYGEN SATURATION: 97 % | TEMPERATURE: 98 F | HEART RATE: 67 BPM | SYSTOLIC BLOOD PRESSURE: 128 MMHG | WEIGHT: 168 LBS | BODY MASS INDEX: 31.53 KG/M2 | BODY MASS INDEX: 31.72 KG/M2 | HEIGHT: 61 IN | HEIGHT: 61 IN

## 2020-06-01 DIAGNOSIS — E78.5 HYPERLIPIDEMIA WITH TARGET LDL LESS THAN 100: ICD-10-CM

## 2020-06-01 DIAGNOSIS — I10 ESSENTIAL HYPERTENSION: ICD-10-CM

## 2020-06-01 DIAGNOSIS — Z12.31 ENCOUNTER FOR SCREENING MAMMOGRAM FOR BREAST CANCER: ICD-10-CM

## 2020-06-01 DIAGNOSIS — Z00.00 ROUTINE MEDICAL EXAM: Primary | ICD-10-CM

## 2020-06-01 DIAGNOSIS — Z11.4 SCREENING FOR HIV (HUMAN IMMUNODEFICIENCY VIRUS): ICD-10-CM

## 2020-06-01 DIAGNOSIS — K21.9 GASTROESOPHAGEAL REFLUX DISEASE, ESOPHAGITIS PRESENCE NOT SPECIFIED: ICD-10-CM

## 2020-06-01 DIAGNOSIS — E11.29 TYPE 2 DIABETES MELLITUS WITH PROTEINURIA: ICD-10-CM

## 2020-06-01 DIAGNOSIS — R80.9 TYPE 2 DIABETES MELLITUS WITH PROTEINURIA: ICD-10-CM

## 2020-06-01 DIAGNOSIS — M10.9 GOUT OF BIG TOE: ICD-10-CM

## 2020-06-01 DIAGNOSIS — Z71.89 ADVANCED DIRECTIVES, COUNSELING/DISCUSSION: ICD-10-CM

## 2020-06-01 DIAGNOSIS — M10.9 GOUT, ARTHRITIS: Primary | ICD-10-CM

## 2020-06-01 DIAGNOSIS — E66.9 OBESITY (BMI 30-39.9): ICD-10-CM

## 2020-06-01 PROCEDURE — 99999 PR PBB SHADOW E&M-EST. PATIENT-LVL III: ICD-10-PCS | Mod: PBBFAC,,, | Performed by: INTERNAL MEDICINE

## 2020-06-01 PROCEDURE — 99396 PREV VISIT EST AGE 40-64: CPT | Mod: S$GLB,,, | Performed by: INTERNAL MEDICINE

## 2020-06-01 PROCEDURE — 3074F SYST BP LT 130 MM HG: CPT | Mod: CPTII,S$GLB,, | Performed by: INTERNAL MEDICINE

## 2020-06-01 PROCEDURE — 3078F PR MOST RECENT DIASTOLIC BLOOD PRESSURE < 80 MM HG: ICD-10-PCS | Mod: CPTII,S$GLB,, | Performed by: INTERNAL MEDICINE

## 2020-06-01 PROCEDURE — 3044F HG A1C LEVEL LT 7.0%: CPT | Mod: CPTII,S$GLB,, | Performed by: INTERNAL MEDICINE

## 2020-06-01 PROCEDURE — 3074F PR MOST RECENT SYSTOLIC BLOOD PRESSURE < 130 MM HG: ICD-10-PCS | Mod: CPTII,S$GLB,, | Performed by: INTERNAL MEDICINE

## 2020-06-01 PROCEDURE — 77067 SCR MAMMO BI INCL CAD: CPT | Mod: 26,,, | Performed by: RADIOLOGY

## 2020-06-01 PROCEDURE — 77067 MAMMO DIGITAL SCREENING BILAT WITH TOMOSYNTHESIS_CAD: ICD-10-PCS | Mod: 26,,, | Performed by: RADIOLOGY

## 2020-06-01 PROCEDURE — 77063 MAMMO DIGITAL SCREENING BILAT WITH TOMOSYNTHESIS_CAD: ICD-10-PCS | Mod: 26,,, | Performed by: RADIOLOGY

## 2020-06-01 PROCEDURE — 3078F DIAST BP <80 MM HG: CPT | Mod: CPTII,S$GLB,, | Performed by: INTERNAL MEDICINE

## 2020-06-01 PROCEDURE — 3044F PR MOST RECENT HEMOGLOBIN A1C LEVEL <7.0%: ICD-10-PCS | Mod: CPTII,S$GLB,, | Performed by: INTERNAL MEDICINE

## 2020-06-01 PROCEDURE — 77063 BREAST TOMOSYNTHESIS BI: CPT | Mod: 26,,, | Performed by: RADIOLOGY

## 2020-06-01 PROCEDURE — 77067 SCR MAMMO BI INCL CAD: CPT | Mod: TC,PO

## 2020-06-01 PROCEDURE — 99396 PR PREVENTIVE VISIT,EST,40-64: ICD-10-PCS | Mod: S$GLB,,, | Performed by: INTERNAL MEDICINE

## 2020-06-01 PROCEDURE — 99999 PR PBB SHADOW E&M-EST. PATIENT-LVL III: CPT | Mod: PBBFAC,,, | Performed by: INTERNAL MEDICINE

## 2020-06-01 RX ORDER — HYDROCHLOROTHIAZIDE 25 MG/1
25 TABLET ORAL DAILY
Qty: 90 TABLET | Refills: 1 | Status: SHIPPED | OUTPATIENT
Start: 2020-06-01 | End: 2021-04-12

## 2020-06-01 RX ORDER — METOPROLOL TARTRATE 50 MG/1
50 TABLET ORAL 3 TIMES DAILY
Qty: 270 TABLET | Refills: 1 | Status: SHIPPED | OUTPATIENT
Start: 2020-06-01 | End: 2021-04-12

## 2020-06-01 RX ORDER — PRAVASTATIN SODIUM 20 MG/1
20 TABLET ORAL DAILY
Qty: 90 TABLET | Refills: 1 | Status: CANCELLED | OUTPATIENT
Start: 2020-06-01

## 2020-06-01 RX ORDER — ATORVASTATIN CALCIUM 20 MG/1
20 TABLET, FILM COATED ORAL DAILY
Qty: 90 TABLET | Refills: 1 | Status: SHIPPED | OUTPATIENT
Start: 2020-06-01 | End: 2020-11-19

## 2020-06-01 RX ORDER — IRBESARTAN 75 MG/1
75 TABLET ORAL NIGHTLY
Qty: 90 TABLET | Refills: 1 | Status: SHIPPED | OUTPATIENT
Start: 2020-06-01 | End: 2021-04-12

## 2020-06-01 NOTE — PROGRESS NOTES
HISTORY OF PRESENT ILLNESS:  Vijaya Montenegro is a 54 y.o. female who presents to the clinic today for a routine physical exam. Her last physical exam was approximately 1 years(s) ago.    Contraception: menopausal, ; followed by gynecology      PAST MEDICAL HISTORY:  Past Medical History:   Diagnosis Date    Anxiety     GERD (gastroesophageal reflux disease)     Gout, arthritis     Hyperlipidemia LDL goal < 100     Hypertension     Obesity     Type II or unspecified type diabetes mellitus without mention of complication, not stated as uncontrolled     diet controlled       PAST SURGICAL HISTORY:  Past Surgical History:   Procedure Laterality Date     SECTION, LOW TRANSVERSE      x2    REFRACTIVE SURGERY Bilateral     [facility]       SOCIAL HISTORY:  Social History     Socioeconomic History    Marital status:      Spouse name: Not on file    Number of children: 2    Years of education: Not on file    Highest education level: Not on file   Occupational History    Not on file   Social Needs    Financial resource strain: Not very hard    Food insecurity:     Worry: Never true     Inability: Never true    Transportation needs:     Medical: No     Non-medical: No   Tobacco Use    Smoking status: Never Smoker    Smokeless tobacco: Never Used   Substance and Sexual Activity    Alcohol use: Yes     Frequency: 2-4 times a month     Drinks per session: 1 or 2     Binge frequency: Never    Drug use: No    Sexual activity: Yes     Partners: Male   Lifestyle    Physical activity:     Days per week: 0 days     Minutes per session: Not on file    Stress: Not at all   Relationships    Social connections:     Talks on phone: More than three times a week     Gets together: More than three times a week     Attends Jew service: Not on file     Active member of club or organization: No     Attends meetings of clubs or organizations: Never     Relationship status:     Other Topics Concern    Not on file   Social History Narrative    Not on file       FAMILY HISTORY:  Family History   Problem Relation Age of Onset    Hypertension Father     Bladder Cancer Father     Cancer Father         bladder    Diabetes Sister     Heart attack Maternal Grandmother     Cataracts Maternal Grandmother     COPD Mother     Blindness Mother         left eye; eye injury    Thyroid disease Mother     No Known Problems Maternal Grandfather     Diabetes Paternal Grandmother     No Known Problems Paternal Grandfather     Diabetes Paternal Aunt     Diabetes Paternal Aunt     No Known Problems Maternal Aunt     No Known Problems Maternal Uncle     No Known Problems Paternal Uncle     Breast cancer Maternal Cousin     Colon cancer Neg Hx     Amblyopia Neg Hx     Glaucoma Neg Hx     Macular degeneration Neg Hx     Retinal detachment Neg Hx     Strabismus Neg Hx     Stroke Neg Hx        ALLERGIES AND MEDICATIONS: updated and reviewed.  Review of patient's allergies indicates:   Allergen Reactions    Codeine Other (See Comments)     Medication List with Changes/Refills   New Medications    ATORVASTATIN (LIPITOR) 20 MG TABLET    Take 1 tablet (20 mg total) by mouth once daily.   Current Medications    ALPRAZOLAM (XANAX) 0.25 MG TABLET    Take 1 tablet (0.25 mg total) by mouth daily as needed for Anxiety.    BLOOD SUGAR DIAGNOSTIC STRP    Test one time daily.    LANCETS 31 GAUGE MISC    1 lancet by Misc.(Non-Drug; Combo Route) route 2 (two) times daily. Test BID    METFORMIN (GLUCOPHAGE-XR) 500 MG 24 HR TABLET    Take 1 tablet (500 mg total) by mouth once daily.    PREVIFEM 0.25-35 MG-MCG PER TABLET    Take 1 tablet by mouth once daily.   Changed and/or Refilled Medications    Modified Medication Previous Medication    HYDROCHLOROTHIAZIDE (HYDRODIURIL) 25 MG TABLET hydroCHLOROthiazide (HYDRODIURIL) 25 MG tablet       Take 1 tablet (25 mg total) by mouth once daily.    Take 1 tablet  (25 mg total) by mouth once daily.    IRBESARTAN (AVAPRO) 75 MG TABLET irbesartan (AVAPRO) 75 MG tablet       Take 1 tablet (75 mg total) by mouth every evening.    Take 1 tablet (75 mg total) by mouth every evening.    METOPROLOL TARTRATE (LOPRESSOR) 50 MG TABLET metoprolol tartrate (LOPRESSOR) 50 MG tablet       Take 1 tablet (50 mg total) by mouth 3 (three) times daily.    Take 1 tablet (50 mg total) by mouth 3 (three) times daily.   Discontinued Medications    PANTOPRAZOLE (PROTONIX) 40 MG TABLET    Take 1 tablet (40 mg total) by mouth daily as needed (heartburn). Take only as needed.    PRAVASTATIN (PRAVACHOL) 20 MG TABLET    Take 1 tablet (20 mg total) by mouth once daily.         CARE TEAM:  Patient Care Team:  Lanetet Hoover MD as PCP - General (Internal Medicine)  Chidi Easley II, MD as PCP - OBGYN (Obstetrics)  Karley Campbell, PharmD as Hypertension Digital Medicine Clinician (Pharmacist)  Kaveh ChapmanD as Diabetes Digital Medicine Clinician (Pharmacist)  Lanette Hoover MD as Hypertension Digital Medicine Responsible Provider (Internal Medicine)  Lanette Hoover MD as Diabetes Digital Medicine Responsible Provider (Internal Medicine)  Kiran Lewis as Digital Medicine Health   Dalila Samuels LPN as Licensed Practical Nurse           SCREENING HISTORY:  Health Maintenance       Date Due Completion Date    HIV Screening 11/03/1981 ---    Shingles Vaccine (1 of 2) 11/03/2016 ---    Eye Exam 04/08/2020 4/8/2019    Override on 8/15/2016: Done    Override on 6/10/2014: Done (normal per patient)    Hemoglobin A1c 07/22/2020 1/22/2020    Override on 11/10/2014: Done ([facility] - 6.6)    Lipid Panel 01/22/2021 1/22/2020    Override on 11/10/2014: Done (Total cholesterol 205, HDL 52, triglycerides 252, and )    Foot Exam 01/27/2021 1/27/2020    Override on 1/27/2020: Done    Urine Microalbumin 01/27/2021 1/27/2020    Mammogram 04/22/2021 4/22/2019    Override on 2/27/2017:  Done (done at [facility])    Override on 2/1/2014: Done    Override on 10/3/2011: Done    Override on 12/5/2006: Done    Low Dose Statin 06/01/2021 6/1/2020    Pap Smear with HPV Cotest 02/13/2022 2/13/2019    Override on 1/19/2015: Done ([employer] Claims)    Override on 10/1/2012: Done    Override on 11/16/2006: Done    TETANUS VACCINE 07/05/2023 7/5/2013    Colonoscopy 05/17/2027 5/17/2017 (Done)    Override on 5/17/2017: Done (Dr. Fernando Ferreira/[facility]- grade 1 internal hemorrhoids)    Override on 3/10/2017: Not Clinically Appropriate (pt will do next month)            REVIEW OF SYSTEMS:   The patient reports: fair dietary habits.  The patient reports : that they do not exercise, but stay active.  Review of Systems   Constitutional: Negative for activity change, chills, fatigue, fever and unexpected weight change.   HENT: Negative for congestion, hearing loss, postnasal drip, rhinorrhea and trouble swallowing.    Eyes: Positive for discharge (- seeing eye doctor later this week). Negative for pain and visual disturbance.   Respiratory: Negative for cough, chest tightness, shortness of breath and wheezing.    Cardiovascular: Negative for chest pain, palpitations and leg swelling.   Gastrointestinal: Negative for abdominal pain, blood in stool, constipation, diarrhea, nausea and vomiting.   Endocrine: Negative for polydipsia and polyuria.   Genitourinary: Negative for difficulty urinating, dysuria, hematuria and menstrual problem.   Musculoskeletal: Negative for arthralgias, back pain, joint swelling and neck pain.        She had a painful left big toe few weeks ago.  She has known gout.  She states she took an anti-inflammatory and the symptoms got better.  She does admit to eating seafood and drinking some alcohol.  She does not eat organ me.   Skin: Negative for rash.   Neurological: Negative for weakness and headaches.   Psychiatric/Behavioral: Negative for confusion, dysphoric mood and sleep  "disturbance. The patient is not nervous/anxious.       ROS (Optional)-: no pelvic pain  Breast ROS (Optional)-: negative for breast lumps/discharge        Physical Examination:   Vitals:    06/01/20 1005   BP: 128/78   Pulse: 67   Temp: 97.7 °F (36.5 °C)     Weight: 76.2 kg (167 lb 15.9 oz)   Height: 5' 1" (154.9 cm)   Body mass index is 31.74 kg/m².      Patient did not require to have a chaperone present during the exam today.    General appearance - alert, well appearing, and in no distress, obese  Psychiatric - alert, oriented to person, place, and time, normal mood, behavior, speech, dress, motor activity, and thought processes  Eyes - pupils equal and reactive, extraocular eye movements intact, sclera anicteric  Mouth - not examined; patient wearing mask due to Covid 19 pandemic  Neck - supple, no significant adenopathy, carotids upstroke normal bilaterally, no bruits, thyroid exam: thyroid is normal in size without nodules or tenderness  Lymphatics - no palpable cervical lymphadenopathy  Chest - clear to auscultation, no wheezes, rales or rhonchi, symmetric air entry  Heart - normal rate and regular rhythm, no gallops noted  Abdomen - soft, nontender, nondistended, no masses or organomegaly  Breasts - not examined  Back exam - full range of motion, no tenderness, palpable spasm or pain on motion, in depth exam deferred  Neurological - alert, normal speech, no focal findings or movement disorder noted, cranial nerves II through XII intact  Musculoskeletal - no joint tenderness, deformity or swelling, no muscular tenderness noted  Extremities - peripheral pulses normal, no pedal edema, no clubbing or cyanosis  Skin - normal coloration and turgor, no rashes, no suspicious skin lesions noted      Labs:  Lab Results   Component Value Date    HGBA1C 6.8 (H) 01/22/2020    HGBA1C 7.1 (H) 04/08/2019    HGBA1C 6.9 (H) 12/04/2018      Lab Results   Component Value Date    CHOL 202 (H) 01/22/2020    CHOL 196 04/08/2019 "    CHOL 190 08/13/2018     Lab Results   Component Value Date    LDLCALC 110.6 01/22/2020    LDLCALC 114.8 04/08/2019    LDLCALC 112.8 08/13/2018         ASSESSMENT AND PLAN:  1. Routine medical exam  Counseled on age appropriate medical preventative services including age appropriate cancer screenings, age appropriate eye and dental exams, over all nutritional health, need for a consistent exercise regimen, and an over all push towards maintaining a vigorous and active lifestyle.  Counseled on age appropriate vaccines and discussed upcoming health care needs based on age/gender. Discussed good sleep hygiene and stress management.    2. Type 2 diabetes mellitus with proteinuria  Diabetes is under good control control at this time for age and comorbid conditions. We discussed diabetic diet and regular exercise. We discussed home blood sugar monitoring, if appropriate - the patient does not need to test daily but can test only as needed. Continue current medication regimen.  Diabetic complications addressed: Not applicable.  Patient was counseled on the need for yearly eye exam to screen for/monitor diabetic retinopathy and yearly diabetic foot exam.  - Hemoglobin A1C; Future    3. Essential hypertension  Discussed sodium restriction, maintaining ideal body weight and regular exercise program as physiologic means to achieve blood pressure control. The patient will strive towards this.   The current medical regimen is effective;  continue present plan and medications. Recommended patient to check home readings to monitor and see me for followup as scheduled or sooner as needed.   Patient was educated that both decongestant and anti-inflammatory medication may raise blood pressure.  The patient is active on the digital hypertension program.  - metoprolol tartrate (LOPRESSOR) 50 MG tablet; Take 1 tablet (50 mg total) by mouth 3 (three) times daily.  Dispense: 270 tablet; Refill: 1  - irbesartan (AVAPRO) 75 MG tablet;  Take 1 tablet (75 mg total) by mouth every evening.  Dispense: 90 tablet; Refill: 1  - hydroCHLOROthiazide (HYDRODIURIL) 25 MG tablet; Take 1 tablet (25 mg total) by mouth once daily.  Dispense: 90 tablet; Refill: 1    4. Hyperlipidemia with target LDL less than 100  LDL has not been at goal.  We discussed low fat diet and regular exercise.  I will change her from pravastatin to atorvastatin.  Recheck blood work in 6 months.  - atorvastatin (LIPITOR) 20 MG tablet; Take 1 tablet (20 mg total) by mouth once daily.  Dispense: 90 tablet; Refill: 1    5. Gastroesophageal reflux disease, esophagitis presence not specified  Symptoms controlled: yes - takes medication occasionally as needed.   Reflux precautions discussed (eliminate tobacco if a smoker; minimize caffeine, chocolate and red/white peppermint intake; avoid heavy and spicy meals; don't lay down within 2-3 hours after eating; minimize the intake of NSAIDs). Medication as needed.   Patient asked to take medication breaks, if possible - discussed chronic use can limit calcium absorption (which can lead to osteopenia/osteoporosis), increases the risk for intestinal infections, and can cause kidney damage. There are also some newer studies that show possible increased risk of mortality.    6. Obesity (BMI 30-39.9)  The patient is asked to make an attempt to improve diet and exercise patterns to aid in medical management of this problem.    7. Advanced directives, counseling/discussion  Advance Care Planning   I initiated the process and explained the importance of advance care planning today with the patient.  Advanced directives were discussed due to patient's age and/or chronic medical conditions. Prognosis based on current condition: good.   Paperwork was given to complete living will (at this point in time, the patient does have full decision-making capacity.  We discussed different extreme health states that she could experience, and reviewed what kind of  medical care she would want in those situations) and medical POA (The patient received detailed information about the importance of designating a Health Care Power of . She was also instructed to communicate with this person about their wishes for future healthcare, should she become sick and lose decision-making capacity).   [place] was not discussed.   Approximately 3 minute(s) were spent on counseling/discussion regarding end of life decision making.           8. Screening for HIV (human immunodeficiency virus)  Routine screening with next blood draw.  - HIV 1/2 Ag/Ab (4th Gen); Future    9. Gout of big toe  Symptoms resolved.  She will let me know the name of the anti-inflammatory medication that she took so I can give her a prescription for future use.  Gout precautions discussed: avoid alcohol, seafood and organ meats; stay well hydrated.    10. Encounter for screening mammogram for breast cancer    - Mammo Digital Screening Bilat; Future           Follow up in about 6 months (around 12/1/2020), or if symptoms worsen or fail to improve, for follow up chronic medical conditions.. or sooner as needed.

## 2020-06-02 RX ORDER — KETOROLAC TROMETHAMINE 10 MG/1
10 TABLET, FILM COATED ORAL EVERY 6 HOURS PRN
Qty: 20 TABLET | Refills: 0 | Status: SHIPPED | OUTPATIENT
Start: 2020-06-02 | End: 2021-04-22 | Stop reason: SDUPTHER

## 2020-06-03 ENCOUNTER — OFFICE VISIT (OUTPATIENT)
Dept: OPHTHALMOLOGY | Facility: CLINIC | Age: 55
End: 2020-06-03
Attending: OPHTHALMOLOGY
Payer: COMMERCIAL

## 2020-06-03 DIAGNOSIS — E11.9 DIABETES MELLITUS TYPE 2 WITHOUT RETINOPATHY: Primary | ICD-10-CM

## 2020-06-03 DIAGNOSIS — H47.392 OPTIC DISC HEMORRHAGE, LEFT: ICD-10-CM

## 2020-06-03 LAB
LEFT EYE DM RETINOPATHY: NEGATIVE
RIGHT EYE DM RETINOPATHY: NEGATIVE

## 2020-06-03 PROCEDURE — 99999 PR PBB SHADOW E&M-EST. PATIENT-LVL II: ICD-10-PCS | Mod: PBBFAC,,, | Performed by: OPHTHALMOLOGY

## 2020-06-03 PROCEDURE — 92014 COMPRE OPH EXAM EST PT 1/>: CPT | Mod: S$GLB,,, | Performed by: OPHTHALMOLOGY

## 2020-06-03 PROCEDURE — 92014 PR EYE EXAM, EST PATIENT,COMPREHESV: ICD-10-PCS | Mod: S$GLB,,, | Performed by: OPHTHALMOLOGY

## 2020-06-03 PROCEDURE — 99999 PR PBB SHADOW E&M-EST. PATIENT-LVL II: CPT | Mod: PBBFAC,,, | Performed by: OPHTHALMOLOGY

## 2020-06-03 NOTE — PROGRESS NOTES
HPI     DLS 04/08/2019 by Dr. Justine MD    55 YO F here today for her 6 mo DFE/OCT (RNFL) ck for the Tx of Type II DM   w/o Retinopathy.     CC: Pt c/o unable to read like she use to and using readers with good   effect.   Otherwise vision good OU.  No f/f/pain OU      POHx:  Type II DM w/o Retinopathy  GLAUCOMA+ grandmother   S/P LASIX 15 years ago.     Last edited by Franck Fletcher MD on 6/3/2020  9:42 AM. (History)            Assessment /Plan     For exam results, see Encounter Report.    Diabetes mellitus type 2 without retinopathy    Optic disc hemorrhage, left      Reviewed 3/2018 fundus photos.  Had heme off ONH OS.  Resolved today    Pt has DM treated for about 2 yrs  BS controlled now 6.8 A1c  FH gl in GM.   IOP good.  RNFL normal.  Will observe    Stressed importance of good BS/BP/Chol Control  RTC immediately PRN any vision changes, radha blurry vision, missing vision, floaters, distortions, etc    Follow up in about 1 year (around 6/3/2021), or if symptoms worsen or fail to improve, for Comprehensive Examination.

## 2020-07-17 ENCOUNTER — LAB VISIT (OUTPATIENT)
Dept: LAB | Facility: HOSPITAL | Age: 55
End: 2020-07-17
Attending: INTERNAL MEDICINE
Payer: COMMERCIAL

## 2020-07-17 DIAGNOSIS — E11.29 TYPE 2 DIABETES MELLITUS WITH PROTEINURIA: ICD-10-CM

## 2020-07-17 DIAGNOSIS — R80.9 TYPE 2 DIABETES MELLITUS WITH PROTEINURIA: ICD-10-CM

## 2020-07-17 DIAGNOSIS — Z11.4 SCREENING FOR HIV (HUMAN IMMUNODEFICIENCY VIRUS): ICD-10-CM

## 2020-07-17 PROCEDURE — 86703 HIV-1/HIV-2 1 RESULT ANTBDY: CPT

## 2020-07-17 PROCEDURE — 36415 COLL VENOUS BLD VENIPUNCTURE: CPT | Mod: PO

## 2020-07-17 PROCEDURE — 83036 HEMOGLOBIN GLYCOSYLATED A1C: CPT

## 2020-07-18 LAB
ESTIMATED AVG GLUCOSE: 157 MG/DL (ref 68–131)
HBA1C MFR BLD HPLC: 7.1 % (ref 4–5.6)

## 2020-07-20 LAB — HIV 1+2 AB+HIV1 P24 AG SERPL QL IA: NEGATIVE

## 2020-10-16 DIAGNOSIS — R80.9 TYPE 2 DIABETES MELLITUS WITH PROTEINURIA: ICD-10-CM

## 2020-10-16 DIAGNOSIS — E11.29 TYPE 2 DIABETES MELLITUS WITH PROTEINURIA: ICD-10-CM

## 2020-10-16 RX ORDER — METFORMIN HYDROCHLORIDE 500 MG/1
500 TABLET, EXTENDED RELEASE ORAL DAILY
Qty: 90 TABLET | Refills: 0 | Status: SHIPPED | OUTPATIENT
Start: 2020-10-16 | End: 2021-01-13

## 2020-10-16 NOTE — TELEPHONE ENCOUNTER
----- Message from Shasta Hankins sent at 10/16/2020  9:12 AM CDT -----  Regarding: Self/  [phone]  Type: RX Refill Request    Who Called: Patient    Refill or New Rx:  Refill    RX Name and Strength:  metFORMIN (GLUCOPHAGE-XR) 500 MG 24 hr tablet    Preferred Pharmacy with phone number:  [id number] - [place], [place] - [address] [facility]    Local or Mail Order:    Ordering Provider: MITCH Hoover    Would the patient rather a call back or a response via [facility]?   Call back    Best Call Back Number:  [phone]    Additional Information:  Patient is waiting at pharmacy.  Thank you

## 2020-11-25 NOTE — PROGRESS NOTES
Digital Medicine: Health  Follow-Up    The history is provided by the patient.             Reason for review: Blood glucose not at goal and Blood pressure at goal        Topics Covered on Call: physical activity and device use    Additional Follow-up details: She explained that she uses her own glucometer and has been inputting readings into [facility], although she hadn't input her infrequent readings lately because her password has not been working. She plans to try resetting it today, and requests that I check in with her again next week about it.    She complained of dizziness in the middle of the night and plans to discuss it with her physician, either at her next appointment in December or before if it continues to bother her.     We reviewed the goals of the programs, and she explained that she would like to get more regular about taking her blood sugar readings.             Diet-Not assessed          Physical Activity-Change      She exercises for 45 minutes per day 7 day(s) a week.     She added walking her dogs to Her physical activity routine.      Medication Adherence-Medication adherence was asssessed.  Patient continue taking medication as prescribed.        Substance, Sleep, Stress-Not assessed      Additional monitoring needed.  Continue current diet/physical activity routine.  Provided patient education.       Patient verbalizes understanding.      Explained the importance of self-monitoring and medication adherence. Encouraged the patient to communicate with their health  for lifestyle modifications to help improve or maintain a healthy lifestyle.               There are no preventive care reminders to display for this patient.      Last 5 Patient Entered Readings                                      Current 30 Day Average: 119/80     Recent Readings 11/25/2020 11/17/2020 11/16/2020 11/8/2020 11/5/2020    SBP (mmHg) 129 123 132 111 113    DBP (mmHg) 84 81 84 71 78    Pulse 75 68 81 72 71         Last 6 Patient Entered Readings                                          Most Recent A1c: 7.1% on 7/17/2020  (Goal: 7%)     Recent Readings 5/29/2020 4/1/2020 3/24/2020 3/23/2020 3/17/2020    Blood Glucose (mg/dL) 140 132 142 150 130

## 2020-12-04 ENCOUNTER — PATIENT OUTREACH (OUTPATIENT)
Dept: OTHER | Facility: OTHER | Age: 55
End: 2020-12-04

## 2020-12-04 NOTE — PROGRESS NOTES
Digital Medicine: Health  Follow-Up    The history is provided by the patient.             Reason for review: Blood glucose not at goal and Blood pressure at goal        Topics Covered on Call: device use    Additional Follow-up details: Spoke with Mrs. Montenegro to check if she was able to access her [facility] account, as we had agreed. She explained that she was able to signin, but then she had to find her glucometer, which she found later. She plans to resume submitting blood sugar readings shortly.            Diet-Not assessed          Physical Activity-Not assessed    Medication Adherence-Medication Adherence not addressed.      Substance, Sleep, Stress-Not assessed      Additional monitoring needed.       Addressed patient questions and patient has my contact information if needed prior to next outreach. Patient verbalizes understanding.      Explained the importance of self-monitoring and medication adherence. Encouraged the patient to communicate with their health  for lifestyle modifications to help improve or maintain a healthy lifestyle.                   Topic    Urine Protein Check          Last 5 Patient Entered Readings                                      Current 30 Day Average: 118/79     Recent Readings 12/2/2020 11/29/2020 11/25/2020 11/17/2020 11/16/2020    SBP (mmHg) 103 127 129 123 132    DBP (mmHg) 74 80 84 81 84    Pulse 72 73 75 68 81        Last 6 Patient Entered Readings                                          Most Recent A1c: 7.1% on 7/17/2020  (Goal: 7%)     Recent Readings 5/29/2020 4/1/2020 3/24/2020 3/23/2020 3/17/2020    Blood Glucose (mg/dL) 140 132 142 150 130

## 2020-12-10 ENCOUNTER — TELEPHONE (OUTPATIENT)
Dept: FAMILY MEDICINE | Facility: CLINIC | Age: 55
End: 2020-12-10

## 2020-12-10 DIAGNOSIS — E11.29 TYPE 2 DIABETES MELLITUS WITH PROTEINURIA: Primary | ICD-10-CM

## 2020-12-10 DIAGNOSIS — R80.9 TYPE 2 DIABETES MELLITUS WITH PROTEINURIA: Primary | ICD-10-CM

## 2020-12-15 DIAGNOSIS — Z11.59 NEED FOR HEPATITIS C SCREENING TEST: Primary | ICD-10-CM

## 2020-12-16 ENCOUNTER — LAB VISIT (OUTPATIENT)
Dept: LAB | Facility: HOSPITAL | Age: 55
End: 2020-12-16
Attending: INTERNAL MEDICINE
Payer: COMMERCIAL

## 2020-12-16 DIAGNOSIS — Z11.59 NEED FOR HEPATITIS C SCREENING TEST: ICD-10-CM

## 2020-12-16 DIAGNOSIS — R80.9 TYPE 2 DIABETES MELLITUS WITH PROTEINURIA: ICD-10-CM

## 2020-12-16 DIAGNOSIS — E11.29 TYPE 2 DIABETES MELLITUS WITH PROTEINURIA: ICD-10-CM

## 2020-12-16 LAB
ESTIMATED AVG GLUCOSE: 177 MG/DL (ref 68–131)
HBA1C MFR BLD HPLC: 7.8 % (ref 4–5.6)
HCV AB SERPL QL IA: NEGATIVE

## 2020-12-16 PROCEDURE — 83036 HEMOGLOBIN GLYCOSYLATED A1C: CPT

## 2020-12-16 PROCEDURE — 36415 COLL VENOUS BLD VENIPUNCTURE: CPT | Mod: PO

## 2020-12-16 PROCEDURE — 86803 HEPATITIS C AB TEST: CPT

## 2020-12-21 ENCOUNTER — OFFICE VISIT (OUTPATIENT)
Dept: FAMILY MEDICINE | Facility: CLINIC | Age: 55
End: 2020-12-21
Payer: COMMERCIAL

## 2020-12-21 ENCOUNTER — LAB VISIT (OUTPATIENT)
Dept: LAB | Facility: HOSPITAL | Age: 55
End: 2020-12-21
Attending: INTERNAL MEDICINE
Payer: COMMERCIAL

## 2020-12-21 VITALS
SYSTOLIC BLOOD PRESSURE: 122 MMHG | DIASTOLIC BLOOD PRESSURE: 78 MMHG | HEART RATE: 64 BPM | OXYGEN SATURATION: 97 % | HEIGHT: 61 IN | BODY MASS INDEX: 29.92 KG/M2 | TEMPERATURE: 98 F | WEIGHT: 158.5 LBS

## 2020-12-21 DIAGNOSIS — I10 ESSENTIAL HYPERTENSION: ICD-10-CM

## 2020-12-21 DIAGNOSIS — E66.9 OBESITY (BMI 30-39.9): ICD-10-CM

## 2020-12-21 DIAGNOSIS — E78.5 HYPERLIPIDEMIA WITH TARGET LDL LESS THAN 100: ICD-10-CM

## 2020-12-21 DIAGNOSIS — K21.9 GASTROESOPHAGEAL REFLUX DISEASE, UNSPECIFIED WHETHER ESOPHAGITIS PRESENT: ICD-10-CM

## 2020-12-21 DIAGNOSIS — E11.29 TYPE 2 DIABETES MELLITUS WITH PROTEINURIA: ICD-10-CM

## 2020-12-21 DIAGNOSIS — E11.29 TYPE 2 DIABETES MELLITUS WITH PROTEINURIA: Primary | ICD-10-CM

## 2020-12-21 DIAGNOSIS — R80.9 TYPE 2 DIABETES MELLITUS WITH PROTEINURIA: Primary | ICD-10-CM

## 2020-12-21 DIAGNOSIS — R10.9 ABDOMINAL PAIN, UNSPECIFIED ABDOMINAL LOCATION: ICD-10-CM

## 2020-12-21 DIAGNOSIS — Z23 NEED FOR SHINGLES VACCINE: ICD-10-CM

## 2020-12-21 DIAGNOSIS — R11.10 VOMITING, INTRACTABILITY OF VOMITING NOT SPECIFIED, PRESENCE OF NAUSEA NOT SPECIFIED, UNSPECIFIED VOMITING TYPE: ICD-10-CM

## 2020-12-21 DIAGNOSIS — R80.9 TYPE 2 DIABETES MELLITUS WITH PROTEINURIA: ICD-10-CM

## 2020-12-21 LAB
ALBUMIN/CREAT UR: 22.4 UG/MG (ref 0–30)
CREAT UR-MCNC: 76 MG/DL (ref 15–325)
MICROALBUMIN UR DL<=1MG/L-MCNC: 17 UG/ML

## 2020-12-21 PROCEDURE — 3078F DIAST BP <80 MM HG: CPT | Mod: CPTII,S$GLB,, | Performed by: INTERNAL MEDICINE

## 2020-12-21 PROCEDURE — 3008F BODY MASS INDEX DOCD: CPT | Mod: CPTII,S$GLB,, | Performed by: INTERNAL MEDICINE

## 2020-12-21 PROCEDURE — 3074F PR MOST RECENT SYSTOLIC BLOOD PRESSURE < 130 MM HG: ICD-10-PCS | Mod: CPTII,S$GLB,, | Performed by: INTERNAL MEDICINE

## 2020-12-21 PROCEDURE — 99999 PR PBB SHADOW E&M-EST. PATIENT-LVL V: CPT | Mod: PBBFAC,,, | Performed by: INTERNAL MEDICINE

## 2020-12-21 PROCEDURE — 99999 PR PBB SHADOW E&M-EST. PATIENT-LVL V: ICD-10-PCS | Mod: PBBFAC,,, | Performed by: INTERNAL MEDICINE

## 2020-12-21 PROCEDURE — 1126F PR PAIN SEVERITY QUANTIFIED, NO PAIN PRESENT: ICD-10-PCS | Mod: S$GLB,,, | Performed by: INTERNAL MEDICINE

## 2020-12-21 PROCEDURE — 99214 OFFICE O/P EST MOD 30 MIN: CPT | Mod: S$GLB,,, | Performed by: INTERNAL MEDICINE

## 2020-12-21 PROCEDURE — 3051F HG A1C>EQUAL 7.0%<8.0%: CPT | Mod: CPTII,S$GLB,, | Performed by: INTERNAL MEDICINE

## 2020-12-21 PROCEDURE — 1126F AMNT PAIN NOTED NONE PRSNT: CPT | Mod: S$GLB,,, | Performed by: INTERNAL MEDICINE

## 2020-12-21 PROCEDURE — 3074F SYST BP LT 130 MM HG: CPT | Mod: CPTII,S$GLB,, | Performed by: INTERNAL MEDICINE

## 2020-12-21 PROCEDURE — 3051F PR MOST RECENT HEMOGLOBIN A1C LEVEL 7.0 - < 8.0%: ICD-10-PCS | Mod: CPTII,S$GLB,, | Performed by: INTERNAL MEDICINE

## 2020-12-21 PROCEDURE — 82043 UR ALBUMIN QUANTITATIVE: CPT

## 2020-12-21 PROCEDURE — 99214 PR OFFICE/OUTPT VISIT, EST, LEVL IV, 30-39 MIN: ICD-10-PCS | Mod: S$GLB,,, | Performed by: INTERNAL MEDICINE

## 2020-12-21 PROCEDURE — 3008F PR BODY MASS INDEX (BMI) DOCUMENTED: ICD-10-PCS | Mod: CPTII,S$GLB,, | Performed by: INTERNAL MEDICINE

## 2020-12-21 PROCEDURE — 3078F PR MOST RECENT DIASTOLIC BLOOD PRESSURE < 80 MM HG: ICD-10-PCS | Mod: CPTII,S$GLB,, | Performed by: INTERNAL MEDICINE

## 2020-12-21 NOTE — PROGRESS NOTES
HISTORY OF PRESENT ILLNESS:  Vijaya Montenegro is a 55 y.o. female who presents to the clinic today for Diabetes and Medication Refill  .   The patient presents to clinic today for follow-up of her type 2 diabetes mellitus complicated by proteinuria, hypertension, hyperlipidemia.  She admits to some dietary indiscretions since her last office visit with me.  She recently did blood work and is here today to discuss the results. The patient denies any problems with cardiac chest pain, dizziness, palpitations, orthopnea, or lower extremity edema. The patient reports compliance with current medication- patient denies missing any  medication doses.  She states that her gynecologist took her off the hormone replacement therapy pills.  The only symptom she is having is some vaginal dryness, but she feels like she has had this off and on her whole life.  She denies any other menopausal symptoms.  She has been having some abdominal symptoms where she will get severe cramping associated with diarrhea and vomiting.  This last few days.  She does not know if it started after she was taken off the hormone replacement therapy pill.  It has occurred only a few times.  The 1st time it happened she had eaten out and she thought she had had food poisoning.  She now knows when it comes on.  Sometime symptoms last only about a day.  She also reports some difficulty with gas after eating certain meals.  She denies blood in her vomit or stool.  She denies unexplained weight loss.  In between episodes she is fine.  She does admit to having lots of stresses.        PAST MEDICAL HISTORY:  Past Medical History:   Diagnosis Date    Anxiety     GERD (gastroesophageal reflux disease)     Gout, arthritis     Hyperlipidemia LDL goal < 100     Hypertension     Obesity     Type II or unspecified type diabetes mellitus without mention of complication, not stated as uncontrolled     diet controlled       PAST SURGICAL HISTORY:  Past Surgical  History:   Procedure Laterality Date     SECTION, LOW TRANSVERSE      x2    REFRACTIVE SURGERY Bilateral     [facility]       SOCIAL HISTORY:  Social History     Socioeconomic History    Marital status:      Spouse name: Not on file    Number of children: 2    Years of education: Not on file    Highest education level: Not on file   Occupational History    Not on file   Social Needs    Financial resource strain: Not very hard    Food insecurity     Worry: Never true     Inability: Never true    Transportation needs     Medical: No     Non-medical: No   Tobacco Use    Smoking status: Never Smoker    Smokeless tobacco: Never Used   Substance and Sexual Activity    Alcohol use: Yes     Frequency: 2-4 times a month     Drinks per session: 1 or 2     Binge frequency: Never    Drug use: No    Sexual activity: Yes     Partners: Male   Lifestyle    Physical activity     Days per week: 0 days     Minutes per session: Not on file    Stress: Not at all   Relationships    Social connections     Talks on phone: More than three times a week     Gets together: More than three times a week     Attends Rastafarian service: Not on file     Active member of club or organization: No     Attends meetings of clubs or organizations: Never     Relationship status:    Other Topics Concern    Not on file   Social History Narrative    Not on file       FAMILY HISTORY:  Family History   Problem Relation Age of Onset    Hypertension Father     Bladder Cancer Father     Cancer Father         bladder    Diabetes Sister     Heart attack Maternal Grandmother     Cataracts Maternal Grandmother     COPD Mother     Blindness Mother         left eye; eye injury    Thyroid disease Mother     No Known Problems Maternal Grandfather     Diabetes Paternal Grandmother     No Known Problems Paternal Grandfather     Diabetes Paternal Aunt     Diabetes Paternal Aunt     No Known Problems  Maternal Aunt     No Known Problems Maternal Uncle     No Known Problems Paternal Uncle     Breast cancer Maternal Cousin     Colon cancer Neg Hx     Amblyopia Neg Hx     Glaucoma Neg Hx     Macular degeneration Neg Hx     Retinal detachment Neg Hx     Strabismus Neg Hx     Stroke Neg Hx        ALLERGIES AND MEDICATIONS: updated and reviewed.  Review of patient's allergies indicates:   Allergen Reactions    Codeine Other (See Comments)     Medication List with Changes/Refills   Current Medications    ALPRAZOLAM (XANAX) 0.25 MG TABLET    Take 1 tablet (0.25 mg total) by mouth daily as needed for Anxiety.    ATORVASTATIN (LIPITOR) 20 MG TABLET    Take 1 tablet by mouth once daily    HYDROCHLOROTHIAZIDE (HYDRODIURIL) 25 MG TABLET    Take 1 tablet (25 mg total) by mouth once daily.    IRBESARTAN (AVAPRO) 75 MG TABLET    Take 1 tablet (75 mg total) by mouth every evening.    KETOROLAC (TORADOL) 10 MG TABLET    Take 1 tablet (10 mg total) by mouth every 6 (six) hours as needed (gout). Take with food.    LANCETS 31 GAUGE MISC    1 lancet by Misc.(Non-Drug; Combo Route) route 2 (two) times daily. Test BID    METFORMIN (GLUCOPHAGE-XR) 500 MG ER 24HR TABLET    Take 1 tablet (500 mg total) by mouth once daily.    METOPROLOL TARTRATE (LOPRESSOR) 50 MG TABLET    Take 1 tablet (50 mg total) by mouth 3 (three) times daily.   Discontinued Medications    BLOOD SUGAR DIAGNOSTIC STRP    Test one time daily.    PREVIFEM 0.25-35 MG-MCG PER TABLET    Take 1 tablet by mouth once daily.         CARE TEAM:  Patient Care Team:  Lanette Hoover MD as PCP - General (Internal Medicine)  Chidi Easley II, MD as PCP - OBGYN (Obstetrics)  Karley Campbell, PharmD as Hypertension Digital Medicine Clinician (Pharmacist)  Karley Campbell, PharmD as Diabetes Digital Medicine Clinician (Pharmacist)  Lanette Hoover MD as Hypertension Digital Medicine Responsible Provider (Internal Medicine)  Lanette Hoover MD as Diabetes Digital  "Medicine Responsible Provider (Internal Medicine)  Kiran Lewis as Digital Medicine Health   Dalila Samuels LPN as Licensed Practical Nurse         REVIEW OF SYSTEMS:  Review of Systems   Constitutional: Negative for chills, fatigue, fever and unexpected weight change.   HENT: Negative for congestion and postnasal drip.    Eyes: Negative for pain and visual disturbance.   Respiratory: Negative for cough, shortness of breath and wheezing.    Cardiovascular: Negative for chest pain, palpitations and leg swelling.   Gastrointestinal: Negative for abdominal pain, constipation, diarrhea, nausea and vomiting.        See HPI. Feeling well today.   Genitourinary: Negative for dysuria.   Musculoskeletal: Negative for arthralgias and back pain.   Skin: Negative for rash.   Neurological: Negative for weakness and headaches.   Psychiatric/Behavioral: Negative for dysphoric mood and sleep disturbance. The patient is not nervous/anxious.          PHYSICAL EXAM:   Vitals:    12/21/20 0927   BP: 122/78   Pulse: 64   Temp: 98.1 °F (36.7 °C)     Weight: 71.9 kg (158 lb 8.2 oz)   Height: 5' 1" (154.9 cm)   Body mass index is 29.95 kg/m².      General appearance - alert, well appearing, and in no distress, overweight  Psychiatric - alert, oriented to person, place, and time, normal mood, behavior, speech, dress, motor activity, and thought processes  Eyes - pupils equal and reactive, extraocular eye movements intact, sclera anicteric  Mouth - not examined; patient wearing mask due to Covid 19 pandemic  Neck - supple, no significant adenopathy, carotids upstroke normal bilaterally, no bruits  Lymphatics - no palpable cervical lymphadenopathy  Chest - clear to auscultation, no wheezes, rales or rhonchi, symmetric air entry  Heart - normal rate and regular rhythm, no gallops noted  Abdomen - soft, nontender, nondistended, no masses or organomegaly  Neurological - alert, normal speech, no focal findings or movement disorder " noted, cranial nerves II through XII intact  Musculoskeletal - patient noted to have Mild osteoarthritic changes to both knee joints. No joint effusions noted., no muscular tenderness noted  Extremities - peripheral pulses normal, no pedal edema, no clubbing or cyanosis  Skin - normal coloration and turgor, no rashes, no suspicious skin lesions noted  Diabetic Foot Exam -   Protective Sensation (w/ 10 gram monofilament):  Right: Intact  Left: Intact    Visual Inspection:  Normal -  Bilateral    Pedal Pulses:   Right: Present  Left: Present    Posterior tibialis:   Right:Present  Left: Present        Labs:  Lab Results   Component Value Date    HGBA1C 7.8 (H) 12/16/2020    HGBA1C 7.1 (H) 07/17/2020    HGBA1C 6.8 (H) 01/22/2020      Lab Results   Component Value Date    CHOL 202 (H) 01/22/2020    CHOL 196 04/08/2019    CHOL 190 08/13/2018     Lab Results   Component Value Date    LDLCALC 110.6 01/22/2020    LDLCALC 114.8 04/08/2019    LDLCALC 112.8 08/13/2018         ASSESSMENT AND PLAN:  1. Type 2 diabetes mellitus with proteinuria  Diabetes is not controlled at this time for age and comorbid conditions. We discussed diabetic diet and regular exercise. We discussed home blood sugar monitoring, if appropriate - the patient should test once daily and as needed. Continue current medication regimen. Recheck A1c in 3 months. She will do better with diabetic diet and regular physical activity. We will make medication adjustments in 3 months if her numbers do not improve. She is active on the digital diabetes program.  Diabetic complications addressed: Not applicable.  Patient was counseled on the need for yearly eye exam to screen for/monitor diabetic retinopathy and yearly diabetic foot exam.  - MICROALBUMIN / CREATININE RATIO URINE; Future    2. Essential hypertension  Discussed sodium restriction, maintaining ideal body weight and regular exercise program as physiologic means to achieve blood pressure control. The  patient will strive towards this.   The current medical regimen is effective;  continue present plan and medications. Recommended patient to check home readings to monitor and see me for followup as scheduled or sooner as needed.   Patient was educated that both decongestant and anti-inflammatory medication may raise blood pressure.  The patient is active on the digital hypertension program.    3. Hyperlipidemia with target LDL less than 100  We discussed low fat diet and regular exercise.The current medical regimen is effective;  continue present plan and medications.     4. Gastroesophageal reflux disease, unspecified whether esophagitis present  Symptoms controlled: yes - takes medication occasionally as needed.   Reflux precautions discussed (eliminate tobacco if a smoker; minimize caffeine, chocolate and red/white peppermint intake; avoid heavy and spicy meals; don't lay down within 2-3 hours after eating; minimize the intake of NSAIDs). Medication as needed.   Patient asked to take medication breaks, if possible - discussed chronic use can limit calcium absorption (which can lead to osteopenia/osteoporosis), increases the risk for intestinal infections, and can cause kidney damage. There are also some newer studies that show possible increased risk of mortality.    5. Obesity (BMI 30-39.9)  The patient is asked to make an attempt to improve diet and exercise patterns to aid in medical management of this problem.    6. Need for shingles vaccine  Deferred.    7. Abdominal pain, unspecified abdominal location/8. Vomiting, intractability of vomiting not specified, presence of nausea not specified, unspecified vomiting type  I am unsure of the etiology of her symptoms.  I will refer her to GI for further evaluation.  - Ambulatory referral/consult to Gastroenterology; Future         Orders Placed This Encounter   Procedures    MICROALBUMIN / CREATININE RATIO URINE    Ambulatory referral/consult to Gastroenterology       Follow up in about 6 months (around 6/21/2021), or if symptoms worsen or fail to improve, for annual exam. or sooner as needed.

## 2021-02-12 DIAGNOSIS — E78.5 HYPERLIPIDEMIA WITH TARGET LDL LESS THAN 100: ICD-10-CM

## 2021-02-12 RX ORDER — ATORVASTATIN CALCIUM 20 MG/1
TABLET, FILM COATED ORAL
Qty: 90 TABLET | Refills: 1 | Status: SHIPPED | OUTPATIENT
Start: 2021-02-12 | End: 2021-07-07 | Stop reason: SDUPTHER

## 2021-03-02 ENCOUNTER — PATIENT MESSAGE (OUTPATIENT)
Dept: FAMILY MEDICINE | Facility: CLINIC | Age: 56
End: 2021-03-02

## 2021-03-22 ENCOUNTER — TELEPHONE (OUTPATIENT)
Dept: FAMILY MEDICINE | Facility: CLINIC | Age: 56
End: 2021-03-22

## 2021-03-22 DIAGNOSIS — R80.9 TYPE 2 DIABETES MELLITUS WITH PROTEINURIA: Primary | ICD-10-CM

## 2021-03-22 DIAGNOSIS — E11.29 TYPE 2 DIABETES MELLITUS WITH PROTEINURIA: Primary | ICD-10-CM

## 2021-03-25 ENCOUNTER — LAB VISIT (OUTPATIENT)
Dept: LAB | Facility: HOSPITAL | Age: 56
End: 2021-03-25
Attending: INTERNAL MEDICINE
Payer: COMMERCIAL

## 2021-03-25 DIAGNOSIS — R80.9 TYPE 2 DIABETES MELLITUS WITH PROTEINURIA: ICD-10-CM

## 2021-03-25 DIAGNOSIS — E11.29 TYPE 2 DIABETES MELLITUS WITH PROTEINURIA: ICD-10-CM

## 2021-03-25 LAB
ESTIMATED AVG GLUCOSE: 154 MG/DL (ref 68–131)
HBA1C MFR BLD: 7 % (ref 4–5.6)

## 2021-03-25 PROCEDURE — 36415 COLL VENOUS BLD VENIPUNCTURE: CPT | Mod: PO | Performed by: INTERNAL MEDICINE

## 2021-03-25 PROCEDURE — 83036 HEMOGLOBIN GLYCOSYLATED A1C: CPT | Performed by: INTERNAL MEDICINE

## 2021-03-31 LAB
HPV, HIGH-RISK: NOT DETECTED
PAP RECOMMENDATION EXT: NORMAL
PAP SMEAR: NORMAL

## 2021-04-07 ENCOUNTER — OFFICE VISIT (OUTPATIENT)
Dept: GASTROENTEROLOGY | Facility: CLINIC | Age: 56
End: 2021-04-07
Payer: COMMERCIAL

## 2021-04-07 ENCOUNTER — LAB VISIT (OUTPATIENT)
Dept: LAB | Facility: HOSPITAL | Age: 56
End: 2021-04-07
Attending: STUDENT IN AN ORGANIZED HEALTH CARE EDUCATION/TRAINING PROGRAM
Payer: COMMERCIAL

## 2021-04-07 VITALS
BODY MASS INDEX: 28.56 KG/M2 | HEIGHT: 61 IN | DIASTOLIC BLOOD PRESSURE: 66 MMHG | SYSTOLIC BLOOD PRESSURE: 116 MMHG | WEIGHT: 151.25 LBS | HEART RATE: 72 BPM

## 2021-04-07 DIAGNOSIS — R10.9 ABDOMINAL PAIN, UNSPECIFIED ABDOMINAL LOCATION: Primary | ICD-10-CM

## 2021-04-07 DIAGNOSIS — R11.0 NAUSEA: ICD-10-CM

## 2021-04-07 DIAGNOSIS — R19.7 DIARRHEA, UNSPECIFIED TYPE: ICD-10-CM

## 2021-04-07 DIAGNOSIS — R10.9 ABDOMINAL PAIN, UNSPECIFIED ABDOMINAL LOCATION: ICD-10-CM

## 2021-04-07 LAB
ALBUMIN SERPL BCP-MCNC: 4 G/DL (ref 3.5–5.2)
ALP SERPL-CCNC: 132 U/L (ref 55–135)
ALT SERPL W/O P-5'-P-CCNC: 41 U/L (ref 10–44)
ANION GAP SERPL CALC-SCNC: 12 MMOL/L (ref 8–16)
AST SERPL-CCNC: 33 U/L (ref 10–40)
BASOPHILS # BLD AUTO: 0.03 K/UL (ref 0–0.2)
BASOPHILS NFR BLD: 0.3 % (ref 0–1.9)
BILIRUB SERPL-MCNC: 0.6 MG/DL (ref 0.1–1)
BUN SERPL-MCNC: 16 MG/DL (ref 6–20)
CALCIUM SERPL-MCNC: 9.8 MG/DL (ref 8.7–10.5)
CHLORIDE SERPL-SCNC: 101 MMOL/L (ref 95–110)
CO2 SERPL-SCNC: 26 MMOL/L (ref 23–29)
CREAT SERPL-MCNC: 1 MG/DL (ref 0.5–1.4)
DIFFERENTIAL METHOD: NORMAL
EOSINOPHIL # BLD AUTO: 0.2 K/UL (ref 0–0.5)
EOSINOPHIL NFR BLD: 1.9 % (ref 0–8)
ERYTHROCYTE [DISTWIDTH] IN BLOOD BY AUTOMATED COUNT: 12.7 % (ref 11.5–14.5)
EST. GFR  (AFRICAN AMERICAN): >60 ML/MIN/1.73 M^2
EST. GFR  (NON AFRICAN AMERICAN): >60 ML/MIN/1.73 M^2
GLUCOSE SERPL-MCNC: 111 MG/DL (ref 70–110)
HCT VFR BLD AUTO: 40.6 % (ref 37–48.5)
HGB BLD-MCNC: 13.4 G/DL (ref 12–16)
IMM GRANULOCYTES # BLD AUTO: 0.02 K/UL (ref 0–0.04)
IMM GRANULOCYTES NFR BLD AUTO: 0.2 % (ref 0–0.5)
LYMPHOCYTES # BLD AUTO: 4.2 K/UL (ref 1–4.8)
LYMPHOCYTES NFR BLD: 41.9 % (ref 18–48)
MCH RBC QN AUTO: 29.5 PG (ref 27–31)
MCHC RBC AUTO-ENTMCNC: 33 G/DL (ref 32–36)
MCV RBC AUTO: 89 FL (ref 82–98)
MONOCYTES # BLD AUTO: 0.7 K/UL (ref 0.3–1)
MONOCYTES NFR BLD: 6.7 % (ref 4–15)
NEUTROPHILS # BLD AUTO: 4.9 K/UL (ref 1.8–7.7)
NEUTROPHILS NFR BLD: 49 % (ref 38–73)
NRBC BLD-RTO: 0 /100 WBC
PLATELET # BLD AUTO: 276 K/UL (ref 150–450)
PMV BLD AUTO: 9.7 FL (ref 9.2–12.9)
POTASSIUM SERPL-SCNC: 3.5 MMOL/L (ref 3.5–5.1)
PROT SERPL-MCNC: 8 G/DL (ref 6–8.4)
RBC # BLD AUTO: 4.54 M/UL (ref 4–5.4)
SODIUM SERPL-SCNC: 139 MMOL/L (ref 136–145)
TSH SERPL DL<=0.005 MIU/L-ACNC: 2.9 UIU/ML (ref 0.4–4)
WBC # BLD AUTO: 9.98 K/UL (ref 3.9–12.7)

## 2021-04-07 PROCEDURE — 85025 COMPLETE CBC W/AUTO DIFF WBC: CPT | Performed by: STUDENT IN AN ORGANIZED HEALTH CARE EDUCATION/TRAINING PROGRAM

## 2021-04-07 PROCEDURE — 1126F AMNT PAIN NOTED NONE PRSNT: CPT | Mod: S$GLB,,, | Performed by: STUDENT IN AN ORGANIZED HEALTH CARE EDUCATION/TRAINING PROGRAM

## 2021-04-07 PROCEDURE — 84443 ASSAY THYROID STIM HORMONE: CPT | Performed by: STUDENT IN AN ORGANIZED HEALTH CARE EDUCATION/TRAINING PROGRAM

## 2021-04-07 PROCEDURE — 80053 COMPREHEN METABOLIC PANEL: CPT | Performed by: STUDENT IN AN ORGANIZED HEALTH CARE EDUCATION/TRAINING PROGRAM

## 2021-04-07 PROCEDURE — 84436 ASSAY OF TOTAL THYROXINE: CPT | Performed by: STUDENT IN AN ORGANIZED HEALTH CARE EDUCATION/TRAINING PROGRAM

## 2021-04-07 PROCEDURE — 3008F BODY MASS INDEX DOCD: CPT | Mod: CPTII,S$GLB,, | Performed by: STUDENT IN AN ORGANIZED HEALTH CARE EDUCATION/TRAINING PROGRAM

## 2021-04-07 PROCEDURE — 86677 HELICOBACTER PYLORI ANTIBODY: CPT | Performed by: STUDENT IN AN ORGANIZED HEALTH CARE EDUCATION/TRAINING PROGRAM

## 2021-04-07 PROCEDURE — 99999 PR PBB SHADOW E&M-EST. PATIENT-LVL III: ICD-10-PCS | Mod: PBBFAC,,, | Performed by: STUDENT IN AN ORGANIZED HEALTH CARE EDUCATION/TRAINING PROGRAM

## 2021-04-07 PROCEDURE — 99204 PR OFFICE/OUTPT VISIT, NEW, LEVL IV, 45-59 MIN: ICD-10-PCS | Mod: S$GLB,,, | Performed by: STUDENT IN AN ORGANIZED HEALTH CARE EDUCATION/TRAINING PROGRAM

## 2021-04-07 PROCEDURE — 3078F DIAST BP <80 MM HG: CPT | Mod: CPTII,S$GLB,, | Performed by: STUDENT IN AN ORGANIZED HEALTH CARE EDUCATION/TRAINING PROGRAM

## 2021-04-07 PROCEDURE — 99204 OFFICE O/P NEW MOD 45 MIN: CPT | Mod: S$GLB,,, | Performed by: STUDENT IN AN ORGANIZED HEALTH CARE EDUCATION/TRAINING PROGRAM

## 2021-04-07 PROCEDURE — 3074F SYST BP LT 130 MM HG: CPT | Mod: CPTII,S$GLB,, | Performed by: STUDENT IN AN ORGANIZED HEALTH CARE EDUCATION/TRAINING PROGRAM

## 2021-04-07 PROCEDURE — 99999 PR PBB SHADOW E&M-EST. PATIENT-LVL III: CPT | Mod: PBBFAC,,, | Performed by: STUDENT IN AN ORGANIZED HEALTH CARE EDUCATION/TRAINING PROGRAM

## 2021-04-07 PROCEDURE — 3074F PR MOST RECENT SYSTOLIC BLOOD PRESSURE < 130 MM HG: ICD-10-PCS | Mod: CPTII,S$GLB,, | Performed by: STUDENT IN AN ORGANIZED HEALTH CARE EDUCATION/TRAINING PROGRAM

## 2021-04-07 PROCEDURE — 83516 IMMUNOASSAY NONANTIBODY: CPT | Mod: 59 | Performed by: STUDENT IN AN ORGANIZED HEALTH CARE EDUCATION/TRAINING PROGRAM

## 2021-04-07 PROCEDURE — 3078F PR MOST RECENT DIASTOLIC BLOOD PRESSURE < 80 MM HG: ICD-10-PCS | Mod: CPTII,S$GLB,, | Performed by: STUDENT IN AN ORGANIZED HEALTH CARE EDUCATION/TRAINING PROGRAM

## 2021-04-07 PROCEDURE — 1126F PR PAIN SEVERITY QUANTIFIED, NO PAIN PRESENT: ICD-10-PCS | Mod: S$GLB,,, | Performed by: STUDENT IN AN ORGANIZED HEALTH CARE EDUCATION/TRAINING PROGRAM

## 2021-04-07 PROCEDURE — 3008F PR BODY MASS INDEX (BMI) DOCUMENTED: ICD-10-PCS | Mod: CPTII,S$GLB,, | Performed by: STUDENT IN AN ORGANIZED HEALTH CARE EDUCATION/TRAINING PROGRAM

## 2021-04-07 RX ORDER — HYOSCYAMINE SULFATE 0.125 MG
125 TABLET ORAL EVERY 6 HOURS PRN
Qty: 30 TABLET | Refills: 1 | Status: SHIPPED | OUTPATIENT
Start: 2021-04-07 | End: 2022-04-13

## 2021-04-09 LAB — T4 SERPL-MCNC: 5.6 UG/DL (ref 4.5–11.5)

## 2021-04-12 DIAGNOSIS — I10 ESSENTIAL HYPERTENSION: ICD-10-CM

## 2021-04-12 DIAGNOSIS — E11.29 TYPE 2 DIABETES MELLITUS WITH PROTEINURIA: ICD-10-CM

## 2021-04-12 DIAGNOSIS — R80.9 TYPE 2 DIABETES MELLITUS WITH PROTEINURIA: ICD-10-CM

## 2021-04-12 LAB
GLIADIN PEPTIDE IGA SER-ACNC: 66 UNITS
GLIADIN PEPTIDE IGG SER-ACNC: 10 UNITS
H PYLORI IGG SERPL QL IA: POSITIVE
IGA SERPL-MCNC: 244 MG/DL (ref 70–400)
TTG IGA SER-ACNC: 28 UNITS
TTG IGG SER-ACNC: 8 UNITS

## 2021-04-12 RX ORDER — METFORMIN HYDROCHLORIDE 500 MG/1
TABLET, EXTENDED RELEASE ORAL
Qty: 90 TABLET | Refills: 0 | Status: SHIPPED | OUTPATIENT
Start: 2021-04-12 | End: 2021-07-07 | Stop reason: SDUPTHER

## 2021-04-12 RX ORDER — METOPROLOL TARTRATE 50 MG/1
TABLET ORAL
Qty: 270 TABLET | Refills: 0 | Status: SHIPPED | OUTPATIENT
Start: 2021-04-12 | End: 2021-07-07 | Stop reason: SDUPTHER

## 2021-04-12 RX ORDER — IRBESARTAN 75 MG/1
TABLET ORAL
Qty: 90 TABLET | Refills: 0 | Status: SHIPPED | OUTPATIENT
Start: 2021-04-12 | End: 2021-07-07 | Stop reason: SDUPTHER

## 2021-04-12 RX ORDER — HYDROCHLOROTHIAZIDE 25 MG/1
TABLET ORAL
Qty: 90 TABLET | Refills: 0 | Status: SHIPPED | OUTPATIENT
Start: 2021-04-12 | End: 2021-07-07 | Stop reason: SDUPTHER

## 2021-04-13 ENCOUNTER — TELEPHONE (OUTPATIENT)
Dept: ENDOSCOPY | Facility: HOSPITAL | Age: 56
End: 2021-04-13

## 2021-04-13 DIAGNOSIS — R89.4 ABNORMAL CELIAC ANTIBODY PANEL: Primary | ICD-10-CM

## 2021-04-15 ENCOUNTER — PATIENT MESSAGE (OUTPATIENT)
Dept: ENDOSCOPY | Facility: HOSPITAL | Age: 56
End: 2021-04-15

## 2021-04-15 DIAGNOSIS — Z01.818 PRE-OP TESTING: ICD-10-CM

## 2021-04-22 DIAGNOSIS — M10.9 GOUT, ARTHRITIS: ICD-10-CM

## 2021-04-22 RX ORDER — KETOROLAC TROMETHAMINE 10 MG/1
10 TABLET, FILM COATED ORAL EVERY 6 HOURS PRN
Qty: 20 TABLET | Refills: 0 | Status: SHIPPED | OUTPATIENT
Start: 2021-04-22 | End: 2021-07-07 | Stop reason: SDUPTHER

## 2021-05-03 ENCOUNTER — TELEPHONE (OUTPATIENT)
Dept: ENDOSCOPY | Facility: HOSPITAL | Age: 56
End: 2021-05-03

## 2021-05-03 DIAGNOSIS — R10.9 ABDOMINAL PAIN, UNSPECIFIED ABDOMINAL LOCATION: Primary | ICD-10-CM

## 2021-05-04 ENCOUNTER — TELEPHONE (OUTPATIENT)
Dept: ENDOSCOPY | Facility: HOSPITAL | Age: 56
End: 2021-05-04

## 2021-05-06 ENCOUNTER — PATIENT MESSAGE (OUTPATIENT)
Dept: GASTROENTEROLOGY | Facility: CLINIC | Age: 56
End: 2021-05-06

## 2021-06-22 PROBLEM — N95.2 ATROPHIC VAGINITIS: Status: ACTIVE | Noted: 2021-03-31

## 2021-06-23 ENCOUNTER — TELEPHONE (OUTPATIENT)
Dept: FAMILY MEDICINE | Facility: CLINIC | Age: 56
End: 2021-06-23

## 2021-06-23 DIAGNOSIS — E78.5 HYPERLIPIDEMIA WITH TARGET LDL LESS THAN 100: ICD-10-CM

## 2021-06-23 DIAGNOSIS — E11.29 TYPE 2 DIABETES MELLITUS WITH PROTEINURIA: Primary | ICD-10-CM

## 2021-06-23 DIAGNOSIS — R80.9 TYPE 2 DIABETES MELLITUS WITH PROTEINURIA: Primary | ICD-10-CM

## 2021-06-30 ENCOUNTER — TELEPHONE (OUTPATIENT)
Dept: FAMILY MEDICINE | Facility: CLINIC | Age: 56
End: 2021-06-30

## 2021-07-07 ENCOUNTER — PATIENT MESSAGE (OUTPATIENT)
Dept: GASTROENTEROLOGY | Facility: CLINIC | Age: 56
End: 2021-07-07

## 2021-07-07 DIAGNOSIS — E78.5 HYPERLIPIDEMIA WITH TARGET LDL LESS THAN 100: ICD-10-CM

## 2021-07-07 DIAGNOSIS — R80.9 TYPE 2 DIABETES MELLITUS WITH PROTEINURIA: ICD-10-CM

## 2021-07-07 DIAGNOSIS — I10 ESSENTIAL HYPERTENSION: ICD-10-CM

## 2021-07-07 DIAGNOSIS — E11.29 TYPE 2 DIABETES MELLITUS WITH PROTEINURIA: ICD-10-CM

## 2021-07-07 DIAGNOSIS — M10.9 GOUT, ARTHRITIS: ICD-10-CM

## 2021-07-07 RX ORDER — ATORVASTATIN CALCIUM 20 MG/1
20 TABLET, FILM COATED ORAL DAILY
Qty: 90 TABLET | Refills: 0 | Status: SHIPPED | OUTPATIENT
Start: 2021-07-07 | End: 2021-09-20 | Stop reason: SDUPTHER

## 2021-07-07 RX ORDER — KETOROLAC TROMETHAMINE 10 MG/1
10 TABLET, FILM COATED ORAL EVERY 6 HOURS PRN
Qty: 20 TABLET | Refills: 0 | Status: SHIPPED | OUTPATIENT
Start: 2021-07-07 | End: 2022-04-13 | Stop reason: SDUPTHER

## 2021-07-07 RX ORDER — HYDROCHLOROTHIAZIDE 25 MG/1
25 TABLET ORAL DAILY
Qty: 90 TABLET | Refills: 0 | Status: SHIPPED | OUTPATIENT
Start: 2021-07-07 | End: 2021-09-20 | Stop reason: SDUPTHER

## 2021-07-07 RX ORDER — IRBESARTAN 75 MG/1
75 TABLET ORAL NIGHTLY
Qty: 90 TABLET | Refills: 0 | Status: SHIPPED | OUTPATIENT
Start: 2021-07-07 | End: 2021-09-20 | Stop reason: SDUPTHER

## 2021-07-07 RX ORDER — METOPROLOL TARTRATE 50 MG/1
50 TABLET ORAL 3 TIMES DAILY
Qty: 270 TABLET | Refills: 0 | Status: SHIPPED | OUTPATIENT
Start: 2021-07-07 | End: 2021-09-20 | Stop reason: SDUPTHER

## 2021-07-07 RX ORDER — METFORMIN HYDROCHLORIDE 500 MG/1
500 TABLET, EXTENDED RELEASE ORAL DAILY
Qty: 90 TABLET | Refills: 0 | Status: SHIPPED | OUTPATIENT
Start: 2021-07-07 | End: 2021-09-20 | Stop reason: SDUPTHER

## 2021-07-08 ENCOUNTER — PATIENT MESSAGE (OUTPATIENT)
Dept: GASTROENTEROLOGY | Facility: CLINIC | Age: 56
End: 2021-07-08

## 2021-07-08 DIAGNOSIS — Z12.31 ENCOUNTER FOR SCREENING MAMMOGRAM FOR MALIGNANT NEOPLASM OF BREAST: Primary | ICD-10-CM

## 2021-07-08 DIAGNOSIS — K90.0 CELIAC DISEASE: ICD-10-CM

## 2021-07-08 RX ORDER — OMEPRAZOLE 40 MG/1
40 CAPSULE, DELAYED RELEASE ORAL 2 TIMES DAILY
Qty: 28 CAPSULE | Refills: 0 | Status: SHIPPED | OUTPATIENT
Start: 2021-07-08 | End: 2022-04-13

## 2021-07-08 RX ORDER — METRONIDAZOLE 500 MG/1
500 TABLET ORAL 3 TIMES DAILY
Qty: 42 TABLET | Refills: 0 | Status: SHIPPED | OUTPATIENT
Start: 2021-07-08 | End: 2021-07-22

## 2021-07-08 RX ORDER — BISMUTH SUBSALICYLATE 262 MG/1
1 TABLET ORAL 4 TIMES DAILY
Qty: 56 TABLET | Refills: 0 | Status: SHIPPED | OUTPATIENT
Start: 2021-07-08 | End: 2021-07-22

## 2021-07-08 RX ORDER — DOXYCYCLINE 100 MG/1
100 CAPSULE ORAL EVERY 12 HOURS
Qty: 28 CAPSULE | Refills: 0 | Status: SHIPPED | OUTPATIENT
Start: 2021-07-08 | End: 2021-07-22

## 2021-07-20 ENCOUNTER — HOSPITAL ENCOUNTER (OUTPATIENT)
Dept: RADIOLOGY | Facility: HOSPITAL | Age: 56
Discharge: HOME | End: 2021-07-20
Attending: OBSTETRICS & GYNECOLOGY
Payer: COMMERCIAL

## 2021-07-20 VITALS — BODY MASS INDEX: 28.51 KG/M2 | WEIGHT: 151 LBS | HEIGHT: 61 IN

## 2021-07-20 DIAGNOSIS — Z12.31 ENCOUNTER FOR SCREENING MAMMOGRAM FOR MALIGNANT NEOPLASM OF BREAST: ICD-10-CM

## 2021-07-20 LAB
LEFT EYE DM RETINOPATHY: NEGATIVE
RIGHT EYE DM RETINOPATHY: NEGATIVE

## 2021-07-20 PROCEDURE — 77063 MAMMO DIGITAL SCREENING BILAT WITH TOMO: ICD-10-PCS | Mod: 26,,, | Performed by: RADIOLOGY

## 2021-07-20 PROCEDURE — 77063 BREAST TOMOSYNTHESIS BI: CPT | Mod: 26,,, | Performed by: RADIOLOGY

## 2021-07-20 PROCEDURE — 77067 SCR MAMMO BI INCL CAD: CPT | Mod: TC

## 2021-07-20 PROCEDURE — 77067 MAMMO DIGITAL SCREENING BILAT WITH TOMO: ICD-10-PCS | Mod: 26,,, | Performed by: RADIOLOGY

## 2021-07-20 PROCEDURE — 77067 SCR MAMMO BI INCL CAD: CPT | Mod: 26,,, | Performed by: RADIOLOGY

## 2021-07-29 ENCOUNTER — PATIENT MESSAGE (OUTPATIENT)
Dept: GASTROENTEROLOGY | Facility: CLINIC | Age: 56
End: 2021-07-29

## 2021-08-03 ENCOUNTER — LAB VISIT (OUTPATIENT)
Dept: LAB | Facility: HOSPITAL | Age: 56
End: 2021-08-03
Attending: INTERNAL MEDICINE
Payer: COMMERCIAL

## 2021-08-03 DIAGNOSIS — K90.0 CELIAC DISEASE: ICD-10-CM

## 2021-08-03 DIAGNOSIS — R80.9 TYPE 2 DIABETES MELLITUS WITH PROTEINURIA: ICD-10-CM

## 2021-08-03 DIAGNOSIS — E78.5 HYPERLIPIDEMIA WITH TARGET LDL LESS THAN 100: ICD-10-CM

## 2021-08-03 DIAGNOSIS — E11.29 TYPE 2 DIABETES MELLITUS WITH PROTEINURIA: ICD-10-CM

## 2021-08-03 LAB
CHOLEST SERPL-MCNC: 168 MG/DL (ref 120–199)
CHOLEST/HDLC SERPL: 3.4 {RATIO} (ref 2–5)
ESTIMATED AVG GLUCOSE: 160 MG/DL (ref 68–131)
HBA1C MFR BLD: 7.2 % (ref 4–5.6)
HDLC SERPL-MCNC: 49 MG/DL (ref 40–75)
HDLC SERPL: 29.2 % (ref 20–50)
LDLC SERPL CALC-MCNC: 96.2 MG/DL (ref 63–159)
NONHDLC SERPL-MCNC: 119 MG/DL
TRIGL SERPL-MCNC: 114 MG/DL (ref 30–150)

## 2021-08-03 PROCEDURE — 80061 LIPID PANEL: CPT | Performed by: INTERNAL MEDICINE

## 2021-08-03 PROCEDURE — 83036 HEMOGLOBIN GLYCOSYLATED A1C: CPT | Performed by: INTERNAL MEDICINE

## 2021-08-03 PROCEDURE — 36415 COLL VENOUS BLD VENIPUNCTURE: CPT | Mod: PO | Performed by: STUDENT IN AN ORGANIZED HEALTH CARE EDUCATION/TRAINING PROGRAM

## 2021-08-03 PROCEDURE — 83516 IMMUNOASSAY NONANTIBODY: CPT | Mod: 59 | Performed by: STUDENT IN AN ORGANIZED HEALTH CARE EDUCATION/TRAINING PROGRAM

## 2021-08-05 LAB
GLIADIN PEPTIDE IGA SER-ACNC: 39 UNITS
GLIADIN PEPTIDE IGG SER-ACNC: 8 UNITS
IGA SERPL-MCNC: 268 MG/DL (ref 70–400)
TTG IGA SER-ACNC: 14 UNITS
TTG IGG SER-ACNC: 9 UNITS

## 2021-08-10 ENCOUNTER — LAB VISIT (OUTPATIENT)
Dept: LAB | Facility: HOSPITAL | Age: 56
End: 2021-08-10
Attending: STUDENT IN AN ORGANIZED HEALTH CARE EDUCATION/TRAINING PROGRAM
Payer: COMMERCIAL

## 2021-08-10 DIAGNOSIS — R10.9 ABDOMINAL PAIN, UNSPECIFIED ABDOMINAL LOCATION: ICD-10-CM

## 2021-08-10 DIAGNOSIS — Z01.818 PRE-OP TESTING: ICD-10-CM

## 2021-08-10 PROCEDURE — 87338 HPYLORI STOOL AG IA: CPT | Performed by: STUDENT IN AN ORGANIZED HEALTH CARE EDUCATION/TRAINING PROGRAM

## 2021-08-20 ENCOUNTER — TELEPHONE (OUTPATIENT)
Dept: ENDOSCOPY | Facility: HOSPITAL | Age: 56
End: 2021-08-20

## 2021-09-16 LAB — H PYLORI AG STL QL IA: NOT DETECTED

## 2021-09-20 ENCOUNTER — PATIENT OUTREACH (OUTPATIENT)
Dept: ADMINISTRATIVE | Facility: HOSPITAL | Age: 56
End: 2021-09-20

## 2021-09-20 ENCOUNTER — OFFICE VISIT (OUTPATIENT)
Dept: FAMILY MEDICINE | Facility: CLINIC | Age: 56
End: 2021-09-20
Payer: COMMERCIAL

## 2021-09-20 VITALS
OXYGEN SATURATION: 96 % | TEMPERATURE: 98 F | DIASTOLIC BLOOD PRESSURE: 72 MMHG | BODY MASS INDEX: 27.74 KG/M2 | HEART RATE: 66 BPM | SYSTOLIC BLOOD PRESSURE: 124 MMHG | WEIGHT: 146.94 LBS | HEIGHT: 61 IN

## 2021-09-20 DIAGNOSIS — I10 ESSENTIAL HYPERTENSION: ICD-10-CM

## 2021-09-20 DIAGNOSIS — R80.9 TYPE 2 DIABETES MELLITUS WITH PROTEINURIA: ICD-10-CM

## 2021-09-20 DIAGNOSIS — E66.3 OVERWEIGHT (BMI 25.0-29.9): ICD-10-CM

## 2021-09-20 DIAGNOSIS — M10.9 GOUT, ARTHRITIS: ICD-10-CM

## 2021-09-20 DIAGNOSIS — E11.29 TYPE 2 DIABETES MELLITUS WITH PROTEINURIA: ICD-10-CM

## 2021-09-20 DIAGNOSIS — K21.9 GASTROESOPHAGEAL REFLUX DISEASE, UNSPECIFIED WHETHER ESOPHAGITIS PRESENT: ICD-10-CM

## 2021-09-20 DIAGNOSIS — Z23 NEED FOR SHINGLES VACCINE: ICD-10-CM

## 2021-09-20 DIAGNOSIS — K90.0 CELIAC DISEASE: ICD-10-CM

## 2021-09-20 DIAGNOSIS — Z23 FLU VACCINE NEED: ICD-10-CM

## 2021-09-20 DIAGNOSIS — Z00.00 ROUTINE MEDICAL EXAM: Primary | ICD-10-CM

## 2021-09-20 DIAGNOSIS — E78.5 HYPERLIPIDEMIA WITH TARGET LDL LESS THAN 100: ICD-10-CM

## 2021-09-20 PROCEDURE — 3008F BODY MASS INDEX DOCD: CPT | Mod: CPTII,S$GLB,, | Performed by: INTERNAL MEDICINE

## 2021-09-20 PROCEDURE — 99999 PR PBB SHADOW E&M-EST. PATIENT-LVL III: CPT | Mod: PBBFAC,,, | Performed by: INTERNAL MEDICINE

## 2021-09-20 PROCEDURE — 1160F PR REVIEW ALL MEDS BY PRESCRIBER/CLIN PHARMACIST DOCUMENTED: ICD-10-PCS | Mod: CPTII,S$GLB,, | Performed by: INTERNAL MEDICINE

## 2021-09-20 PROCEDURE — 99396 PR PREVENTIVE VISIT,EST,40-64: ICD-10-PCS | Mod: S$GLB,,, | Performed by: INTERNAL MEDICINE

## 2021-09-20 PROCEDURE — 3078F PR MOST RECENT DIASTOLIC BLOOD PRESSURE < 80 MM HG: ICD-10-PCS | Mod: CPTII,S$GLB,, | Performed by: INTERNAL MEDICINE

## 2021-09-20 PROCEDURE — 3051F HG A1C>EQUAL 7.0%<8.0%: CPT | Mod: CPTII,S$GLB,, | Performed by: INTERNAL MEDICINE

## 2021-09-20 PROCEDURE — 3051F PR MOST RECENT HEMOGLOBIN A1C LEVEL 7.0 - < 8.0%: ICD-10-PCS | Mod: CPTII,S$GLB,, | Performed by: INTERNAL MEDICINE

## 2021-09-20 PROCEDURE — 1160F RVW MEDS BY RX/DR IN RCRD: CPT | Mod: CPTII,S$GLB,, | Performed by: INTERNAL MEDICINE

## 2021-09-20 PROCEDURE — 1159F PR MEDICATION LIST DOCUMENTED IN MEDICAL RECORD: ICD-10-PCS | Mod: CPTII,S$GLB,, | Performed by: INTERNAL MEDICINE

## 2021-09-20 PROCEDURE — 3074F SYST BP LT 130 MM HG: CPT | Mod: CPTII,S$GLB,, | Performed by: INTERNAL MEDICINE

## 2021-09-20 PROCEDURE — 1159F MED LIST DOCD IN RCRD: CPT | Mod: CPTII,S$GLB,, | Performed by: INTERNAL MEDICINE

## 2021-09-20 PROCEDURE — 4010F PR ACE/ARB THEARPY RXD/TAKEN: ICD-10-PCS | Mod: CPTII,S$GLB,, | Performed by: INTERNAL MEDICINE

## 2021-09-20 PROCEDURE — 3078F DIAST BP <80 MM HG: CPT | Mod: CPTII,S$GLB,, | Performed by: INTERNAL MEDICINE

## 2021-09-20 PROCEDURE — 3008F PR BODY MASS INDEX (BMI) DOCUMENTED: ICD-10-PCS | Mod: CPTII,S$GLB,, | Performed by: INTERNAL MEDICINE

## 2021-09-20 PROCEDURE — 4010F ACE/ARB THERAPY RXD/TAKEN: CPT | Mod: CPTII,S$GLB,, | Performed by: INTERNAL MEDICINE

## 2021-09-20 PROCEDURE — 99396 PREV VISIT EST AGE 40-64: CPT | Mod: S$GLB,,, | Performed by: INTERNAL MEDICINE

## 2021-09-20 PROCEDURE — 99999 PR PBB SHADOW E&M-EST. PATIENT-LVL III: ICD-10-PCS | Mod: PBBFAC,,, | Performed by: INTERNAL MEDICINE

## 2021-09-20 PROCEDURE — 3074F PR MOST RECENT SYSTOLIC BLOOD PRESSURE < 130 MM HG: ICD-10-PCS | Mod: CPTII,S$GLB,, | Performed by: INTERNAL MEDICINE

## 2021-09-20 RX ORDER — ATORVASTATIN CALCIUM 20 MG/1
20 TABLET, FILM COATED ORAL DAILY
Qty: 90 TABLET | Refills: 1 | Status: SHIPPED | OUTPATIENT
Start: 2021-09-20 | End: 2022-04-06

## 2021-09-20 RX ORDER — METFORMIN HYDROCHLORIDE 500 MG/1
1000 TABLET, EXTENDED RELEASE ORAL DAILY
Qty: 90 TABLET | Refills: 1 | Status: SHIPPED | OUTPATIENT
Start: 2021-09-20 | End: 2022-01-09 | Stop reason: SDUPTHER

## 2021-09-20 RX ORDER — KETOROLAC TROMETHAMINE 10 MG/1
10 TABLET, FILM COATED ORAL EVERY 6 HOURS PRN
Qty: 20 TABLET | Refills: 0 | Status: CANCELLED | OUTPATIENT
Start: 2021-09-20

## 2021-09-20 RX ORDER — IRBESARTAN 75 MG/1
75 TABLET ORAL NIGHTLY
Qty: 90 TABLET | Refills: 1 | Status: SHIPPED | OUTPATIENT
Start: 2021-09-20 | End: 2022-04-06

## 2021-09-20 RX ORDER — METOPROLOL TARTRATE 50 MG/1
50 TABLET ORAL 3 TIMES DAILY
Qty: 270 TABLET | Refills: 1 | Status: SHIPPED | OUTPATIENT
Start: 2021-09-20 | End: 2022-04-06

## 2021-09-20 RX ORDER — HYDROCHLOROTHIAZIDE 25 MG/1
25 TABLET ORAL DAILY
Qty: 90 TABLET | Refills: 1 | Status: SHIPPED | OUTPATIENT
Start: 2021-09-20 | End: 2022-04-06

## 2021-10-28 ENCOUNTER — PATIENT MESSAGE (OUTPATIENT)
Dept: OTHER | Facility: OTHER | Age: 56
End: 2021-10-28
Payer: COMMERCIAL

## 2022-01-07 DIAGNOSIS — R80.9 TYPE 2 DIABETES MELLITUS WITH PROTEINURIA: ICD-10-CM

## 2022-01-07 DIAGNOSIS — E11.29 TYPE 2 DIABETES MELLITUS WITH PROTEINURIA: ICD-10-CM

## 2022-01-07 NOTE — TELEPHONE ENCOUNTER
No new care gaps identified.  Powered by [employer]. Reference number: [id number].   1/07/2022 11:12:27 AM CST

## 2022-01-09 DIAGNOSIS — R80.9 TYPE 2 DIABETES MELLITUS WITH PROTEINURIA: ICD-10-CM

## 2022-01-09 DIAGNOSIS — E11.29 TYPE 2 DIABETES MELLITUS WITH PROTEINURIA: ICD-10-CM

## 2022-01-09 NOTE — TELEPHONE ENCOUNTER
No new care gaps identified.  Powered by [employer] by [employer]. Reference number: [id number].   1/09/2022 3:28:06 PM CST

## 2022-01-10 RX ORDER — METFORMIN HYDROCHLORIDE 500 MG/1
TABLET, EXTENDED RELEASE ORAL
Qty: 90 TABLET | Refills: 0 | OUTPATIENT
Start: 2022-01-10

## 2022-01-10 RX ORDER — METFORMIN HYDROCHLORIDE 500 MG/1
1000 TABLET, EXTENDED RELEASE ORAL DAILY
Qty: 90 TABLET | Refills: 0 | Status: SHIPPED | OUTPATIENT
Start: 2022-01-10 | End: 2022-02-07 | Stop reason: SDUPTHER

## 2022-01-10 NOTE — TELEPHONE ENCOUNTER
Guerline DC. Request already responded to by other means (e.g. phone or fax)   Refill Authorization Note   Vijaya Montenegro  is requesting a refill authorization.  Brief Assessment and Rationale for Refill:  Quick Discontinue  Medication Therapy Plan:       Medication Reconciliation Completed:  No      Comments:   Pended Medication(s)       Requested Prescriptions     Refused Prescriptions Disp Refills    metFORMIN (GLUCOPHAGE-XR) 500 MG ER 24hr tablet [Pharmacy Med Name: metFORMIN HCl  MG Oral Tablet Extended Release 24 Hour] 90 tablet 0     Sig: Take 2 tablets by mouth once daily     Refused By: DAVID GARCIA     Reason for Refusal: Request already responded to by other means (e.g. phone or fax)        Duplicate Pended Encounter(s)/ Last Prescribed Details: (includes pharmacy & prescriber details)   Pharmacy    [facility] - [place] [address], [facility] [address]   Phone:  [phone]  Fax:  [phone]   JOCELYN #:  --   DELMER Reason: --       Outpatient Medication Detail     Disp Refills Start End DELMER   metFORMIN (GLUCOPHAGE-XR) 500 MG ER 24hr tablet 90 tablet 0 1/10/2022  No   Sig - Route: Take 2 tablets (1,000 mg total) by mouth once daily. - Oral   Sent to pharmacy as: metFORMIN (GLUCOPHAGE-XR) 500 MG ER 24hr tablet   Class: Normal   Order: [id number]   Date/Time Signed: 1/10/2022 11:18       E-Prescribing Status: Receipt confirmed by pharmacy (1/10/2022 11:31 AM CST)     Ordering Encounter Report    Associated Reports   View Encounter                Note composed:1:50 PM 01/10/2022

## 2022-01-10 NOTE — TELEPHONE ENCOUNTER
Spoke with the Patient to inform Rx refill has been approved and sent to preferred pharmacy.  Patient verbally understands.

## 2022-02-03 ENCOUNTER — TELEPHONE (OUTPATIENT)
Dept: FAMILY MEDICINE | Facility: CLINIC | Age: 57
End: 2022-02-03
Payer: COMMERCIAL

## 2022-02-07 DIAGNOSIS — E11.29 TYPE 2 DIABETES MELLITUS WITH PROTEINURIA: Primary | ICD-10-CM

## 2022-02-07 DIAGNOSIS — R80.9 TYPE 2 DIABETES MELLITUS WITH PROTEINURIA: Primary | ICD-10-CM

## 2022-02-07 DIAGNOSIS — E78.5 HYPERLIPIDEMIA WITH TARGET LDL LESS THAN 100: ICD-10-CM

## 2022-02-07 RX ORDER — METFORMIN HYDROCHLORIDE 500 MG/1
1000 TABLET, EXTENDED RELEASE ORAL DAILY
Qty: 90 TABLET | Refills: 0 | Status: SHIPPED | OUTPATIENT
Start: 2022-02-07 | End: 2022-03-22

## 2022-02-07 NOTE — TELEPHONE ENCOUNTER
Care Due:                  Date            Visit Type   Department     Provider  --------------------------------------------------------------------------------                                EP -         [facility]                              PRIMARY      MED/ INTERNAL  Varun Coughlin  Last Visit: 09-      CARE ([facility])   MED/ PEDS      Matthew BAUMANN                              ANNUAL       [facility]                              CHECKUP/PHY  MED/ INTERNAL  Varun Coughlin  Next Visit: 04-      S            MED/ PEDS      Matthew Hoover                                                            Last  Test          Frequency    Reason                     Performed    Due Date  --------------------------------------------------------------------------------    CMP.........  12 months..  atorvastatin,              Not Found    Overdue                             hydroCHLOROthiazide,                             irbesartan, metFORMIN....    HBA1C.......  6 months...  metFORMIN................  08- 01-    Powered by [employer]. Reference number: [id number].   2/07/2022 11:48:50 AM CST

## 2022-02-08 NOTE — TELEPHONE ENCOUNTER
----- Message from Jaxson Ray sent at 2/8/2022 10:15 AM CST -----  Type:  Patient Returning Call    Who Called:     Who Left Message for Patient:     Does the patient know what this is regarding?: missed call     Best Call Back Number:  [phone]    Additional Information:

## 2022-02-16 ENCOUNTER — TELEPHONE (OUTPATIENT)
Dept: FAMILY MEDICINE | Facility: CLINIC | Age: 57
End: 2022-02-16
Payer: COMMERCIAL

## 2022-02-16 NOTE — TELEPHONE ENCOUNTER
Spoke to pt and resched her appt to 4/13 at 8:20.   Scheduled her labs before then as well. Patient verbally understood.

## 2022-03-22 ENCOUNTER — TELEPHONE (OUTPATIENT)
Dept: FAMILY MEDICINE | Facility: CLINIC | Age: 57
End: 2022-03-22
Payer: COMMERCIAL

## 2022-03-22 DIAGNOSIS — R80.9 TYPE 2 DIABETES MELLITUS WITH PROTEINURIA: ICD-10-CM

## 2022-03-22 DIAGNOSIS — E11.29 TYPE 2 DIABETES MELLITUS WITH PROTEINURIA: ICD-10-CM

## 2022-03-22 RX ORDER — METFORMIN HYDROCHLORIDE 500 MG/1
TABLET, EXTENDED RELEASE ORAL
Qty: 90 TABLET | Refills: 0 | Status: SHIPPED | OUTPATIENT
Start: 2022-03-22 | End: 2022-03-22

## 2022-03-22 RX ORDER — METFORMIN HYDROCHLORIDE 500 MG/1
1000 TABLET, EXTENDED RELEASE ORAL DAILY
Qty: 180 TABLET | Refills: 0 | Status: SHIPPED | OUTPATIENT
Start: 2022-03-22 | End: 2022-04-13 | Stop reason: SDUPTHER

## 2022-03-22 NOTE — TELEPHONE ENCOUNTER
----- Message from Tiffanie Juan sent at 3/22/2022 12:00 PM CDT -----  Type: RX Refill Request    Who Called: [employer] [place] [employer] [phone]    Have you contacted your pharmacy:    Refill or New Rx:metFORMIN (GLUCOPHAGE-XR) 500 MG ER 24hr tablet - needs quantity clarification. Call pharmacy. Can patient get 120 tablets.    RX Name and Strength:    How is the patient currently taking it? (ex. 1XDay):    Is this a 30 day or 90 day RX:    Preferred Pharmacy with phone number:    Local or Mail Order:    Ordering Provider:    Would the patient rather a call back or a response via [facility]?     Best Call Back Number:    Additional Information:

## 2022-03-22 NOTE — TELEPHONE ENCOUNTER
Care Due:                  Date            Visit Type   Department     Provider  --------------------------------------------------------------------------------                                [facility]                              PRIMARY      MED/ INTERNAL  [facility] Madyson  Last Visit: 09-      CARE ([facility])   MED/ PEDS      Darienkeler  Sneha                              [facility]      MED/ INTERNAL  Laurentia Madyson  Next Visit: 04-      CARE ([facility])   MED/ PEDS      Darienkeler  Sneha                                                            Last  Test          Frequency    Reason                     Performed    Due Date  --------------------------------------------------------------------------------    CMP.........  12 months..  atorvastatin,              04- 04-                             hydroCHLOROthiazide,                             irbesartan, metFORMIN....    Powered by [employer] by [employer]. Reference number: [id number].   3/22/2022 8:40:21 AM CDT

## 2022-04-04 DIAGNOSIS — E78.5 HYPERLIPIDEMIA WITH TARGET LDL LESS THAN 100: ICD-10-CM

## 2022-04-04 DIAGNOSIS — I10 ESSENTIAL HYPERTENSION: ICD-10-CM

## 2022-04-04 NOTE — TELEPHONE ENCOUNTER
No new care gaps identified.  Powered by [employer] by [employer]. Reference number: [id number].   4/04/2022 11:46:34 AM CDT

## 2022-04-06 RX ORDER — METOPROLOL TARTRATE 50 MG/1
TABLET ORAL
Qty: 180 TABLET | Refills: 0 | Status: SHIPPED | OUTPATIENT
Start: 2022-04-06 | End: 2022-04-13 | Stop reason: SDUPTHER

## 2022-04-06 RX ORDER — ATORVASTATIN CALCIUM 20 MG/1
TABLET, FILM COATED ORAL
Qty: 90 TABLET | Refills: 1 | Status: SHIPPED | OUTPATIENT
Start: 2022-04-06 | End: 2022-04-13 | Stop reason: SDUPTHER

## 2022-04-06 RX ORDER — HYDROCHLOROTHIAZIDE 25 MG/1
TABLET ORAL
Qty: 90 TABLET | Refills: 1 | Status: SHIPPED | OUTPATIENT
Start: 2022-04-06 | End: 2022-04-13 | Stop reason: SDUPTHER

## 2022-04-06 RX ORDER — IRBESARTAN 75 MG/1
TABLET ORAL
Qty: 90 TABLET | Refills: 0 | Status: SHIPPED | OUTPATIENT
Start: 2022-04-06 | End: 2022-04-13 | Stop reason: SDUPTHER

## 2022-04-06 NOTE — TELEPHONE ENCOUNTER
Refill Routing Note   Medication(s) are not appropriate for processing by [facility] for the following reason(s):      - Drug-Disease Interaction (hydroCHLOROthiazide and Gout, arthritis)    [facility] action(s):  Defer  Approve Medication-related problems identified: Drug-disease interaction        --->Care Gap information included in message below if applicable.   Medication reconciliation completed: No   Automatic Epic Generated Protocol Data:    Orders Placed This Encounter    irbesartan (AVAPRO) 75 MG tablet    metoprolol tartrate (LOPRESSOR) 50 MG tablet    atorvastatin (LIPITOR) 20 MG tablet      Requested Prescriptions   Pending Prescriptions Disp Refills    hydroCHLOROthiazide (HYDRODIURIL) 25 MG tablet [Pharmacy Med Name: hydrochlorothiazide 25 mg tablet] 90 tablet 0     Sig: TAKE 1 TABLET(S) BY MOUTH ONCE A DAY       Cardiovascular: Diuretics - Thiazide Failed - 4/4/2022 11:46 AM        Failed - Matches previous order       Previous Authorizing Provider: Lanette Hoover MD (irbesartan (AVAPRO) 75 MG tablet)  Previous Authorizing Provider: Lanette Hoover MD (metoprolol tartrate (LOPRESSOR) 50 MG tablet)  Previous Authorizing Provider: Lanette Hoover MD (atorvastatin (LIPITOR) 20 MG tablet)  Previous Authorizing Provider: Lanette Hoover MD (hydroCHLOROthiazide (HYDRODIURIL) 25 MG tablet)  Previous Pharmacy: [address]  Previous Pharmacy: [facility] [address] [place] [address]  Previous Pharmacy: [facility] [address] [place] [address]  Previous Pharmacy: [facility] [address] [place] [address]            Failed - Cr is 1.39 or below and within 360 days     Lab Results   Component Value Date    CREATININE 1.0 04/07/2021    CREATININE 1.0 01/22/2020    CREATININE 0.9 04/08/2019              Failed - K in normal range and within 360 days     Potassium   Date Value Ref Range Status   04/07/2021  3.5 3.5 - 5.1 mmol/L Final   01/22/2020 3.6 3.5 - 5.1 mmol/L Final   04/08/2019 4.0 3.5 - 5.1 mmol/L Final              Failed - Na is between 130 and 148 and within 360 days     Sodium   Date Value Ref Range Status   04/07/2021 139 136 - 145 mmol/L Final   01/22/2020 137 136 - 145 mmol/L Final   04/08/2019 139 136 - 145 mmol/L Final              Failed - eGFR within 360 days     Lab Results   Component Value Date    EGFRNONAA >60 04/07/2021    EGFRNONAA >60.0 01/22/2020    EGFRNONAA >60.0 04/08/2019                Passed - Patient is at least 18 years old        Passed - Last BP in normal range within 360 days     BP Readings from Last 3 Encounters:   09/20/21 124/72   04/07/21 116/66   12/21/20 122/78               Passed - Valid encounter within last 15 months     Recent Visits  Date Type Provider Dept   09/20/21 Office Visit Lanette Hoover MD [facility] Family Med/ Internal Med/ Peds   12/21/20 Office Visit Lanette Hoover MD [facility] Family Med/ Internal Med/ Peds   06/01/20 Office Visit Lanette Hoover MD [facility] Family Med/ Internal Med/ Peds   Showing recent visits within past 720 days and meeting all other requirements  Future Appointments  No visits were found meeting these conditions.  Showing future appointments within next 150 days and meeting all other requirements      Future Appointments              In 3 days [facility] [place]    In 3 days [facility] [place]    In 1 week [facility]                Passed - No ED/Hospital visits since last PCP visit     Last PCP Visit: 9/20/2021 Last Admission:  Last ED Visit: 7/5/2013           Signed Prescriptions Disp Refills    irbesartan (AVAPRO) 75 MG tablet 90 tablet 0     Sig: TAKE 1 TABLET(S) BY MOUTH IN THE EVENING       Cardiovascular:  Angiotensin Receptor Blockers Failed - 4/4/2022 11:46 AM        Failed - Matches previous order       Previous Authorizing Provider: Lanette Hoover,  MD (irbesartan (AVAPRO) 75 MG tablet)  Previous Authorizing Provider: Lanette Hoover MD (metoprolol tartrate (LOPRESSOR) 50 MG tablet)  Previous Authorizing Provider: Lanette Hoover MD (atorvastatin (LIPITOR) 20 MG tablet)  Previous Authorizing Provider: Lanette Hoover MD (hydroCHLOROthiazide (HYDRODIURIL) 25 MG tablet)  Previous Pharmacy: [facility] [address] [place] [address]  Previous Pharmacy: [facility] [address] [place] [address]  Previous Pharmacy: [facility] [address] [place] [address]  Previous Pharmacy: [address]            Failed - Cr is 1.39 or below and within 360 days     Lab Results   Component Value Date    CREATININE 1.0 04/07/2021    CREATININE 1.0 01/22/2020    CREATININE 0.9 04/08/2019              Failed - K is 5.2 or below and within 360 days     Potassium   Date Value Ref Range Status   04/07/2021 3.5 3.5 - 5.1 mmol/L Final   01/22/2020 3.6 3.5 - 5.1 mmol/L Final   04/08/2019 4.0 3.5 - 5.1 mmol/L Final              Failed - eGFR within 360 days     Lab Results   Component Value Date    EGFRNONAA >60 04/07/2021    EGFRNONAA >60.0 01/22/2020    EGFRNONAA >60.0 04/08/2019                Passed - Patient is at least 18 years old        Passed - Last BP in normal range within 360 days     BP Readings from Last 3 Encounters:   09/20/21 124/72   04/07/21 116/66   12/21/20 122/78               Passed - Valid encounter within last 15 months     Recent Visits  Date Type Provider Dept   09/20/21 Office Visit Lanette Hoover MD [facility] Family Med/ Internal Med/ Peds   12/21/20 Office Visit Lanette Hoover MD [facility] Family Med/ Internal Med/ Peds   06/01/20 Office Visit Lanette Hooevr MD [facility] Family Med/ Internal Med/ Peds   Showing recent visits within past 720 days and meeting all other requirements  Future Appointments  No visits were found meeting these conditions.  Showing future  appointments within next 150 days and meeting all other requirements      Future Appointments              In 3 days [facility] [place]    In 3 days [facility] [place]    In 1 week MD Jemal Brooks - Family [facility], [place]                Passed - No ED/Hospital visits since last PCP visit     Last PCP Visit: 9/20/2021 Last Admission:  Last ED Visit: 7/5/2013            metoprolol tartrate (LOPRESSOR) 50 MG tablet 180 tablet 0     Sig: TAKE 1 TABLET(S) BY MOUTH THREE TIMES DAILY       Cardiovascular:  Beta Blockers Failed - 4/4/2022 11:46 AM        Failed - Matches previous order       Previous Authorizing Provider: Lanette Hoover MD (irbesartan (AVAPRO) 75 MG tablet)  Previous Authorizing Provider: Lanette Hoover MD (metoprolol tartrate (LOPRESSOR) 50 MG tablet)  Previous Authorizing Provider: Lanette Hoover MD (atorvastatin (LIPITOR) 20 MG tablet)  Previous Authorizing Provider: Lanette Hoover MD (hydroCHLOROthiazide (HYDRODIURIL) 25 MG tablet)  Previous Pharmacy: [facility] [address] [place] [address] [address] [place] [address]  Previous Pharmacy: [facility] [address] [place] [address]  Previous Pharmacy: [facility] [address] [place] [address] [address] [facility]  Previous Pharmacy: [facility] [address] [place] [address] [address] [place] [address]            Passed - Patient is at least 18 years old        Passed - Last BP in normal range within 360 days     BP Readings from Last 3 Encounters:   09/20/21 124/72   04/07/21 116/66   12/21/20 122/78               Passed - Last Heart Rate in normal range within 360 days     Pulse Readings from Last 1 Encounters:   09/20/21 66              Passed - Valid encounter within last 15 months     Recent Visits  Date Type Provider Dept   09/20/21 Office Visit Lanette Hoover MD [facility] Family Med/ Internal Med/ Peds   12/21/20 Office Visit Lanette Hoover MD [facility] Family Med/ Internal Med/ Peds   06/01/20 Office Visit  Lanette Hoover MD [facility] Family Med/ Internal Med/ Peds   Showing recent visits within past 720 days and meeting all other requirements  Future Appointments  No visits were found meeting these conditions.  Showing future appointments within next 150 days and meeting all other requirements      Future Appointments              In 3 days [facility]    In 3 days [facility], [facility]    In 1 week Lanette Hoover MD [facility] - Family [facility], [place]                Passed - No ED/Hospital visits since last PCP visit     Last PCP Visit: 9/20/2021 Last Admission:  Last ED Visit: 7/5/2013            atorvastatin (LIPITOR) 20 MG tablet 90 tablet 1     Sig: TAKE 1 TABLET(S) BY MOUTH ONCE A DAY       Cardiovascular:  Antilipid - Statins Failed - 4/4/2022 11:46 AM        Failed - Matches previous order       Previous Authorizing Provider: Lanette Hoover MD (irbesartan (AVAPRO) 75 MG tablet)  Previous Authorizing Provider: Lanette Hoover MD (metoprolol tartrate (LOPRESSOR) 50 MG tablet)  Previous Authorizing Provider: Lanette Hoover MD (atorvastatin (LIPITOR) 20 MG tablet)  Previous Authorizing Provider: Lanette Hoover MD (hydroCHLOROthiazide (HYDRODIURIL) 25 MG tablet)  Previous Pharmacy: [facility] [address] [place] [address]  Previous Pharmacy: [facility] [address] [place] [address]  Previous Pharmacy: [facility] [address] [place] [address]  Previous Pharmacy: [facility] [address] [place] [address]            Failed - ALT is 131 or below and within 360 days     ALT   Date Value Ref Range Status   04/07/2021 41 10 - 44 U/L Final   01/22/2020 15 10 - 44 U/L Final   04/08/2019 18 10 - 44 U/L Final              Failed - AST is 119 or below and within 360 days     AST   Date Value Ref Range Status   04/07/2021 33 10 - 40 U/L Final   01/22/2020 15 10 - 40 U/L Final   04/08/2019 19 10 - 40 U/L Final               Passed - Patient is at least 18 years old        Passed - Valid encounter within last 15 months     Recent Visits  Date Type Provider Dept   09/20/21 [facility]   12/21/20 [facility]   06/01/20 Office [facility]   Showing recent visits within past 720 days and meeting all other requirements  Future Appointments  No visits were found meeting these conditions.  Showing future appointments within next 150 days and meeting all other requirements      Future Appointments              In 3 days [facility] [place]    In 3 days [facility] [place]    [facility]                Passed - No ED/Hospital visits since last PCP visit     Last PCP Visit: 9/20/2021 Last Admission:  Last ED Visit: 7/5/2013          Passed - Total Cholesterol within 360 days     Lab Results   Component Value Date    CHOL 168 08/03/2021    CHOL 202 (H) 01/22/2020    CHOL 196 04/08/2019              Passed - LDL within 360 days     LDL Cholesterol   Date Value Ref Range Status   08/03/2021 96.2 63.0 - 159.0 mg/dL Final     Comment:     The National Cholesterol Education Program (NCEP) has set the  following guidelines (reference values) for LDL Cholesterol:  Optimal.......................<130 mg/dL  Borderline High...............130-159 mg/dL  High..........................160-189 mg/dL  Very High.....................>190 mg/dL              Passed - HDL within 360 days     HDL   Date Value Ref Range Status   08/03/2021 49 40 - 75 mg/dL Final     Comment:     The National Cholesterol Education Program (NCEP) has set the  following guidelines (reference values) for HDL Cholesterol:  Low...............<40 mg/dL  Optimal...........>60 mg/dL              Passed - Triglycerides within 360 days     Lab  Results   Component Value Date    TRIG 114 08/03/2021    TRIG 207 (H) 01/22/2020    TRIG 156 (H) 04/08/2019                    Appointments  past 12m or future 3m with PCP    Date Provider   Last Visit   9/20/2021 Lanette Hoover MD   Next Visit   4/13/2022 Lanette Hoover MD   ED visits in past 90 days: 0        Note composed:12:26 PM 04/06/2022

## 2022-04-09 ENCOUNTER — LAB VISIT (OUTPATIENT)
Dept: LAB | Facility: HOSPITAL | Age: 57
End: 2022-04-09
Attending: INTERNAL MEDICINE
Payer: COMMERCIAL

## 2022-04-09 DIAGNOSIS — E11.29 TYPE 2 DIABETES MELLITUS WITH PROTEINURIA: ICD-10-CM

## 2022-04-09 DIAGNOSIS — R80.9 TYPE 2 DIABETES MELLITUS WITH PROTEINURIA: ICD-10-CM

## 2022-04-09 LAB
ALBUMIN/CREAT UR: 32.7 UG/MG (ref 0–30)
CREAT UR-MCNC: 52 MG/DL (ref 15–325)
MICROALBUMIN UR DL<=1MG/L-MCNC: 17 UG/ML

## 2022-04-09 PROCEDURE — 82570 ASSAY OF URINE CREATININE: CPT | Performed by: INTERNAL MEDICINE

## 2022-04-09 PROCEDURE — 82043 UR ALBUMIN QUANTITATIVE: CPT | Performed by: INTERNAL MEDICINE

## 2022-04-13 ENCOUNTER — OFFICE VISIT (OUTPATIENT)
Dept: FAMILY MEDICINE | Facility: CLINIC | Age: 57
End: 2022-04-13
Payer: COMMERCIAL

## 2022-04-13 ENCOUNTER — PATIENT OUTREACH (OUTPATIENT)
Dept: ADMINISTRATIVE | Facility: HOSPITAL | Age: 57
End: 2022-04-13
Payer: COMMERCIAL

## 2022-04-13 VITALS
BODY MASS INDEX: 27.6 KG/M2 | HEIGHT: 62 IN | SYSTOLIC BLOOD PRESSURE: 112 MMHG | WEIGHT: 150 LBS | HEART RATE: 58 BPM | TEMPERATURE: 98 F | DIASTOLIC BLOOD PRESSURE: 68 MMHG | OXYGEN SATURATION: 95 %

## 2022-04-13 DIAGNOSIS — E11.29 TYPE 2 DIABETES MELLITUS WITH PROTEINURIA: Primary | ICD-10-CM

## 2022-04-13 DIAGNOSIS — I10 ESSENTIAL HYPERTENSION: ICD-10-CM

## 2022-04-13 DIAGNOSIS — K90.0 CELIAC DISEASE: ICD-10-CM

## 2022-04-13 DIAGNOSIS — K21.9 GASTROESOPHAGEAL REFLUX DISEASE, UNSPECIFIED WHETHER ESOPHAGITIS PRESENT: ICD-10-CM

## 2022-04-13 DIAGNOSIS — E78.5 HYPERLIPIDEMIA WITH TARGET LDL LESS THAN 100: ICD-10-CM

## 2022-04-13 DIAGNOSIS — M10.9 GOUT, ARTHRITIS: ICD-10-CM

## 2022-04-13 DIAGNOSIS — F41.9 ANXIETY: ICD-10-CM

## 2022-04-13 DIAGNOSIS — E66.3 OVERWEIGHT (BMI 25.0-29.9): ICD-10-CM

## 2022-04-13 DIAGNOSIS — R80.9 TYPE 2 DIABETES MELLITUS WITH PROTEINURIA: Primary | ICD-10-CM

## 2022-04-13 LAB
HPV, HIGH-RISK: NOT DETECTED
PAP RECOMMENDATION EXT: NORMAL
PAP SMEAR: NORMAL

## 2022-04-13 PROCEDURE — 1159F MED LIST DOCD IN RCRD: CPT | Mod: CPTII,S$GLB,, | Performed by: INTERNAL MEDICINE

## 2022-04-13 PROCEDURE — 1160F PR REVIEW ALL MEDS BY PRESCRIBER/CLIN PHARMACIST DOCUMENTED: ICD-10-PCS | Mod: CPTII,S$GLB,, | Performed by: INTERNAL MEDICINE

## 2022-04-13 PROCEDURE — 3078F PR MOST RECENT DIASTOLIC BLOOD PRESSURE < 80 MM HG: ICD-10-PCS | Mod: CPTII,S$GLB,, | Performed by: INTERNAL MEDICINE

## 2022-04-13 PROCEDURE — 99999 PR PBB SHADOW E&M-EST. PATIENT-LVL IV: ICD-10-PCS | Mod: PBBFAC,,, | Performed by: INTERNAL MEDICINE

## 2022-04-13 PROCEDURE — 3008F PR BODY MASS INDEX (BMI) DOCUMENTED: ICD-10-PCS | Mod: CPTII,S$GLB,, | Performed by: INTERNAL MEDICINE

## 2022-04-13 PROCEDURE — 3078F DIAST BP <80 MM HG: CPT | Mod: CPTII,S$GLB,, | Performed by: INTERNAL MEDICINE

## 2022-04-13 PROCEDURE — 3060F PR POS MICROALBUMINURIA RESULT DOCUMENTED/REVIEW: ICD-10-PCS | Mod: CPTII,S$GLB,, | Performed by: INTERNAL MEDICINE

## 2022-04-13 PROCEDURE — 99999 PR PBB SHADOW E&M-EST. PATIENT-LVL IV: CPT | Mod: PBBFAC,,, | Performed by: INTERNAL MEDICINE

## 2022-04-13 PROCEDURE — 1160F RVW MEDS BY RX/DR IN RCRD: CPT | Mod: CPTII,S$GLB,, | Performed by: INTERNAL MEDICINE

## 2022-04-13 PROCEDURE — 3066F NEPHROPATHY DOC TX: CPT | Mod: CPTII,S$GLB,, | Performed by: INTERNAL MEDICINE

## 2022-04-13 PROCEDURE — 1159F PR MEDICATION LIST DOCUMENTED IN MEDICAL RECORD: ICD-10-PCS | Mod: CPTII,S$GLB,, | Performed by: INTERNAL MEDICINE

## 2022-04-13 PROCEDURE — 3008F BODY MASS INDEX DOCD: CPT | Mod: CPTII,S$GLB,, | Performed by: INTERNAL MEDICINE

## 2022-04-13 PROCEDURE — 99214 PR OFFICE/OUTPT VISIT, EST, LEVL IV, 30-39 MIN: ICD-10-PCS | Mod: S$GLB,,, | Performed by: INTERNAL MEDICINE

## 2022-04-13 PROCEDURE — 3074F PR MOST RECENT SYSTOLIC BLOOD PRESSURE < 130 MM HG: ICD-10-PCS | Mod: CPTII,S$GLB,, | Performed by: INTERNAL MEDICINE

## 2022-04-13 PROCEDURE — 3074F SYST BP LT 130 MM HG: CPT | Mod: CPTII,S$GLB,, | Performed by: INTERNAL MEDICINE

## 2022-04-13 PROCEDURE — 99214 OFFICE O/P EST MOD 30 MIN: CPT | Mod: S$GLB,,, | Performed by: INTERNAL MEDICINE

## 2022-04-13 PROCEDURE — 3044F PR MOST RECENT HEMOGLOBIN A1C LEVEL <7.0%: ICD-10-PCS | Mod: CPTII,S$GLB,, | Performed by: INTERNAL MEDICINE

## 2022-04-13 PROCEDURE — 3060F POS MICROALBUMINURIA REV: CPT | Mod: CPTII,S$GLB,, | Performed by: INTERNAL MEDICINE

## 2022-04-13 PROCEDURE — 4010F ACE/ARB THERAPY RXD/TAKEN: CPT | Mod: CPTII,S$GLB,, | Performed by: INTERNAL MEDICINE

## 2022-04-13 PROCEDURE — 3044F HG A1C LEVEL LT 7.0%: CPT | Mod: CPTII,S$GLB,, | Performed by: INTERNAL MEDICINE

## 2022-04-13 PROCEDURE — 3066F PR DOCUMENTATION OF TREATMENT FOR NEPHROPATHY: ICD-10-PCS | Mod: CPTII,S$GLB,, | Performed by: INTERNAL MEDICINE

## 2022-04-13 PROCEDURE — 4010F PR ACE/ARB THEARPY RXD/TAKEN: ICD-10-PCS | Mod: CPTII,S$GLB,, | Performed by: INTERNAL MEDICINE

## 2022-04-13 RX ORDER — ATORVASTATIN CALCIUM 20 MG/1
20 TABLET, FILM COATED ORAL DAILY
Qty: 90 TABLET | Refills: 1 | Status: SHIPPED | OUTPATIENT
Start: 2022-04-13 | End: 2022-10-01 | Stop reason: SDUPTHER

## 2022-04-13 RX ORDER — METFORMIN HYDROCHLORIDE 500 MG/1
1000 TABLET, EXTENDED RELEASE ORAL DAILY
Qty: 180 TABLET | Refills: 1 | Status: SHIPPED | OUTPATIENT
Start: 2022-04-13 | End: 2022-12-08

## 2022-04-13 RX ORDER — IRBESARTAN 75 MG/1
TABLET ORAL
Qty: 90 TABLET | Refills: 1 | Status: SHIPPED | OUTPATIENT
Start: 2022-04-13 | End: 2023-01-09

## 2022-04-13 RX ORDER — METOPROLOL TARTRATE 50 MG/1
TABLET ORAL
Qty: 180 TABLET | Refills: 1 | Status: SHIPPED | OUTPATIENT
Start: 2022-04-13 | End: 2022-09-30

## 2022-04-13 RX ORDER — KETOROLAC TROMETHAMINE 10 MG/1
10 TABLET, FILM COATED ORAL EVERY 6 HOURS PRN
Qty: 20 TABLET | Refills: 0 | Status: SHIPPED | OUTPATIENT
Start: 2022-04-13

## 2022-04-13 RX ORDER — HYDROCHLOROTHIAZIDE 25 MG/1
25 TABLET ORAL DAILY
Qty: 90 TABLET | Refills: 1 | Status: SHIPPED | OUTPATIENT
Start: 2022-04-13 | End: 2023-04-10

## 2022-04-13 NOTE — LETTER
AUTHORIZATION FOR RELEASE OF   CONFIDENTIAL INFORMATION    Dear Dr. Chidi Sanchez II,    We are seeing Vijaya Montenegro, date of birth 1965, in the clinic at [facility] FAMILY MED/ INTERNAL MED/ PEDS. Lanette Hoover MD is the patient's PCP. Vijaya Montenegro has an outstanding lab/procedure at the time we reviewed her chart. In order to help keep her health information updated, she has authorized us to request the following medical record(s):        (  )  MAMMOGRAM                                      (  )  COLONOSCOPY      ( x )  PAP SMEAR [id number]                      (  )  OUTSIDE LAB RESULTS     (  )  DEXA SCAN                                          (  )  EYE EXAM            (  )  FOOT EXAM                                          (  )  ENTIRE RECORD     (  )  OUTSIDE IMMUNIZATIONS                 (  )  _______________         Please fax records to [facility], Laura A Nicos[place], MD,  [phone].     If you have any questions, please contact Dalila Samuels LPN [facility] at   [phone].       Patient Name: Vijaya Montenegro  : 1965  Patient Phone #: [phone]

## 2022-04-13 NOTE — PROGRESS NOTES
HISTORY OF PRESENT ILLNESS:  Vijaya Montenegro is a 56 y.o. female who presents to the clinic today for Follow-up (Diabetes, HTN)  .   The patient presents to clinic today for follow-up of her type 2 diabetes mellitus complicated by proteinuria, hypertension, and hyperlipidemia.  The patient reports she feels great.  She has lost some more weight. The patient denies any problems with cardiac chest pain, dizziness, palpitations, orthopnea, or lower extremity edema. The patient reports compliance with current medication- patient denies missing any  medication doses.  She recently did blood work and is here today to discuss the results.        PAST MEDICAL HISTORY:  Past Medical History:   Diagnosis Date    Anxiety     Celiac disease 2021    GERD (gastroesophageal reflux disease)     Gout, arthritis     H. pylori infection     treated    Hyperlipidemia LDL goal < 100     Hypertension     Obesity     Type II or unspecified type diabetes mellitus without mention of complication, not stated as uncontrolled     diet controlled       PAST SURGICAL HISTORY:  Past Surgical History:   Procedure Laterality Date     SECTION, LOW TRANSVERSE      x2    REFRACTIVE SURGERY Bilateral     [facility]       SOCIAL HISTORY:  Social History     Socioeconomic History    Marital status:     Number of children: 2   Tobacco Use    Smoking status: Never Smoker    Smokeless tobacco: Never Used   Substance and Sexual Activity    Alcohol use: Yes    Drug use: No    Sexual activity: Yes     Partners: Male       FAMILY HISTORY:  Family History   Problem Relation Age of Onset    Hypertension Father     Bladder Cancer Father     Cancer Father         bladder    Diabetes Sister     Heart attack Maternal Grandmother     Cataracts Maternal Grandmother     COPD Mother     Blindness Mother         left eye; eye injury    Thyroid disease Mother     No Known Problems Maternal  Grandfather     Diabetes Paternal Grandmother     No Known Problems Paternal Grandfather     Diabetes Paternal Aunt     Diabetes Paternal Aunt     No Known Problems Maternal Aunt     No Known Problems Maternal Uncle     No Known Problems Paternal Uncle     Breast cancer Maternal Cousin     Colon cancer Neg Hx     Amblyopia Neg Hx     Glaucoma Neg Hx     Macular degeneration Neg Hx     Retinal detachment Neg Hx     Strabismus Neg Hx     Stroke Neg Hx        ALLERGIES AND MEDICATIONS: updated and reviewed.  Review of patient's allergies indicates:   Allergen Reactions    Codeine Other (See Comments)     Medication List with Changes/Refills   Current Medications    ALPRAZOLAM (XANAX) 0.25 MG TABLET    Take 1 tablet (0.25 mg total) by mouth daily as needed for Anxiety.    LANCETS 31 GAUGE MISC    1 lancet by Misc.(Non-Drug; Combo Route) route 2 (two) times daily. Test BID   Changed and/or Refilled Medications    Modified Medication Previous Medication    ATORVASTATIN (LIPITOR) 20 MG TABLET atorvastatin (LIPITOR) 20 MG tablet       Take 1 tablet (20 mg total) by mouth once daily.    TAKE 1 TABLET(S) BY MOUTH ONCE A DAY    HYDROCHLOROTHIAZIDE (HYDRODIURIL) 25 MG TABLET hydroCHLOROthiazide (HYDRODIURIL) 25 MG tablet       Take 1 tablet (25 mg total) by mouth once daily.    TAKE 1 TABLET(S) BY MOUTH ONCE A DAY    IRBESARTAN (AVAPRO) 75 MG TABLET irbesartan (AVAPRO) 75 MG tablet       TAKE 1 TABLET(S) BY MOUTH IN THE EVENING    TAKE 1 TABLET(S) BY MOUTH IN THE EVENING    KETOROLAC (TORADOL) 10 MG TABLET ketorolac (TORADOL) 10 mg tablet       Take 1 tablet (10 mg total) by mouth every 6 (six) hours as needed (gout). Take with food.    Take 1 tablet (10 mg total) by mouth every 6 (six) hours as needed (gout). Take with food.    METFORMIN (GLUCOPHAGE-XR) 500 MG ER 24HR TABLET metFORMIN (GLUCOPHAGE-XR) 500 MG ER 24hr tablet       Take 2 tablets (1,000 mg total) by mouth once daily.    Take 2 tablets (1,000 mg  total) by mouth once daily.    METOPROLOL TARTRATE (LOPRESSOR) 50 MG TABLET metoprolol tartrate (LOPRESSOR) 50 MG tablet       TAKE 1 TABLET(S) BY MOUTH THREE TIMES DAILY    TAKE 1 TABLET(S) BY MOUTH THREE TIMES DAILY   Discontinued Medications    HYOSCYAMINE (ANASPAZ,LEVSIN) 0.125 MG TAB    Take 1 tablet (125 mcg total) by mouth every 6 (six) hours as needed (abdominal pain).    OMEPRAZOLE (PRILOSEC) 40 MG CAPSULE    Take 1 capsule (40 mg total) by mouth 2 (two) times a day. for 14 days         CARE TEAM:  Patient Care Team:  Lanette Hoover MD as PCP - General (Internal Medicine)  Chidi Easley II, MD as PCP - OBGYN (Obstetrics)  Lanette Hoover MD as Hypertension Digital Medicine Responsible Provider (Internal Medicine)  Lanette Hoover MD as Diabetes Digital Medicine Responsible Provider (Internal Medicine)  Kiran Lewis (Inactive) as Digital Medicine Health   Dalila Samuels LPN as Licensed Practical Nurse  Dick Mendez OD as Consulting Physician (Ophthalmology)  Rodolfo Tubbs PA-C as Hypertension Digital Medicine Clinician (Physician Assistant)  Rodolfo Tubbs PA-C as Diabetes Digital Medicine Clinician (Physician Assistant)  Dick Mendez OD as Consulting Physician (Ophthalmology)         REVIEW OF SYSTEMS:  Review of Systems   Constitutional: Negative for chills, fatigue, fever and unexpected weight change.   HENT: Negative for congestion and postnasal drip.    Eyes: Negative for pain and visual disturbance.   Respiratory: Negative for cough, shortness of breath and wheezing.    Cardiovascular: Negative for chest pain, palpitations and leg swelling.   Gastrointestinal: Negative for abdominal pain, constipation, diarrhea, nausea and vomiting.   Genitourinary: Negative for dysuria.   Musculoskeletal: Negative for arthralgias and back pain.   Skin: Negative for rash.   Neurological: Negative for weakness and headaches.   Psychiatric/Behavioral: Negative for  "dysphoric mood and sleep disturbance. The patient is not nervous/anxious.          PHYSICAL EXAM:   Vitals:    04/13/22 0825   BP: 112/68   Pulse: (!) 58   Temp: 98.1 °F (36.7 °C)     Weight: 68.1 kg (150 lb 0.4 oz)   Height: 5' 1.5" (156.2 cm)   Body mass index is 27.89 kg/m².      General appearance - alert, well appearing, and in no distress, overweight  Psychiatric - alert, oriented to person, place, and time, normal behavior, speech, dress, motor activity and thought process  Eyes - pupils equal and reactive, extraocular eye movements intact, sclera anicteric  Mouth - not examined; patient wearing mask due to Covid 19 pandemic  Chest - clear to auscultation, no wheezes, rales or rhonchi, symmetric air entry  Heart - normal rate and regular rhythm, no gallops noted      Labs:  Lab Results   Component Value Date    HGBA1C 6.9 (H) 04/09/2022    HGBA1C 7.2 (H) 08/03/2021    HGBA1C 7.0 (H) 03/25/2021      Lab Results   Component Value Date    CHOL 143 04/09/2022    CHOL 168 08/03/2021    CHOL 202 (H) 01/22/2020     Lab Results   Component Value Date    LDLCALC 79.4 04/09/2022    LDLCALC 96.2 08/03/2021    LDLCALC 110.6 01/22/2020         ASSESSMENT AND PLAN:  1. Type 2 diabetes mellitus with proteinuria  Diabetes is under good control at this time for age and comorbid conditions.   We discussed diabetic diet and regular exercise.   We discussed home blood sugar monitoring, if appropriate - the patient should test once daily and as needed.   Continue current medication regimen. Recheck A1c in 6 months.  Diabetic complications addressed: None addressed today.  Patient was counseled on the need for yearly eye exam to screen for/monitor diabetic retinopathy and yearly diabetic foot exam.  - metFORMIN (GLUCOPHAGE-XR) 500 MG ER 24hr tablet; Take 2 tablets (1,000 mg total) by mouth once daily.  Dispense: 180 tablet; Refill: 1  - Hemoglobin A1C; Future    2. Essential hypertension  The current medical regimen is effective;  " continue present plan and medications. Recommended patient to check home readings to monitor and see me for followup as scheduled or sooner as needed.   Discussed sodium restriction, maintaining ideal body weight and regular exercise program as physiologic means to continue to achieve blood pressure control in addition to medication compliance.  Patient was educated that both decongestant and anti-inflammatory medication may raise blood pressure.  The patient is active on the digital hypertension program.  - hydroCHLOROthiazide (HYDRODIURIL) 25 MG tablet; Take 1 tablet (25 mg total) by mouth once daily.  Dispense: 90 tablet; Refill: 1  - irbesartan (AVAPRO) 75 MG tablet; TAKE 1 TABLET(S) BY MOUTH IN THE EVENING  Dispense: 90 tablet; Refill: 1  - metoprolol tartrate (LOPRESSOR) 50 MG tablet; TAKE 1 TABLET(S) BY MOUTH THREE TIMES DAILY  Dispense: 180 tablet; Refill: 1  - CBC Auto Differential; Future    3. Hyperlipidemia with target LDL less than 100  We discussed low fat diet and regular exercise.The current medical regimen is effective;  continue present plan and medications.   - atorvastatin (LIPITOR) 20 MG tablet; Take 1 tablet (20 mg total) by mouth once daily.  Dispense: 90 tablet; Refill: 1  - Comprehensive Metabolic Panel; Future    4. Gastroesophageal reflux disease, unspecified whether esophagitis present  Symptoms controlled: yes - takes medication occasionally as needed.   Reflux precautions discussed (eliminate tobacco if a smoker; minimize caffeine, chocolate and red/white peppermint intake; avoid heavy and spicy meals; don't lay down within 2-3 hours after eating; minimize the intake of NSAIDs). Medication as needed.   Patient asked to take medication breaks, if possible - discussed chronic use can limit calcium absorption (which can lead to osteopenia/osteoporosis), increases the risk for intestinal infections, and can cause kidney damage. There are also some newer studies that show possible increased  risk of mortality.    5. Celiac disease  Doing well. Continue dietary restrictions. Followed by GI.    6. Gout, arthritis  Stable. Medication as needed.  - ketorolac (TORADOL) 10 mg tablet; Take 1 tablet (10 mg total) by mouth every 6 (six) hours as needed (gout). Take with food.  Dispense: 20 tablet; Refill: 0    7. Anxiety  Stable. Medication as needed.    8. Overweight (BMI 25.0-29.9)  The patient is asked to continue to make an attempt to improve diet and exercise patterns to aid in medical management of this problem.          Orders Placed This Encounter   Procedures    Hemoglobin A1C    Comprehensive Metabolic Panel    CBC Auto Differential      Follow up in about 6 months (around 10/13/2022), or if symptoms worsen or fail to improve, for annual exam. or sooner as needed.

## 2022-04-13 NOTE — LETTER
AUTHORIZATION FOR RELEASE OF   CONFIDENTIAL INFORMATION    Dear Dr. Dick Mendez,    We are seeing Vijaya Montenegro, date of birth 1965, in the clinic at [facility]. Lanette Hoover MD is the patient's PCP. Vijaya Montenegro has an outstanding lab/procedure at the time we reviewed her chart. In order to help keep her health information updated, she has authorized us to request the following medical record(s):        (  )  MAMMOGRAM                                      (  )  COLONOSCOPY      (  )  PAP SMEAR                                          (  )  OUTSIDE LAB RESULTS     (  )  DEXA SCAN                                          ( x )  EYE EXAM(diabetic) [id number]           (  )  FOOT EXAM                                          (  )  ENTIRE RECORD     (  )  OUTSIDE IMMUNIZATIONS                 (  )  _______________         Please fax records to [facility], Laura A [place], MD,  [phone].     If you have any questions, please contact Dalila Samuels LPN [facility] at   [phone].        Patient Name: Vijaya Montenegro  : 1965  Patient Phone #: [phone]

## 2022-04-20 ENCOUNTER — PATIENT OUTREACH (OUTPATIENT)
Dept: ADMINISTRATIVE | Facility: HOSPITAL | Age: 57
End: 2022-04-20
Payer: COMMERCIAL

## 2022-09-20 ENCOUNTER — TELEPHONE (OUTPATIENT)
Dept: FAMILY MEDICINE | Facility: CLINIC | Age: 57
End: 2022-09-20
Payer: COMMERCIAL

## 2022-09-20 DIAGNOSIS — Z12.31 BREAST CANCER SCREENING BY MAMMOGRAM: ICD-10-CM

## 2022-09-20 DIAGNOSIS — Z78.0 MENOPAUSE: Primary | ICD-10-CM

## 2022-09-20 NOTE — TELEPHONE ENCOUNTER
----- Message from Sharron Antoine sent at 9/20/2022 11:14 AM CDT -----  .Type:  Mammogram    Caller is requesting to schedule their annual mammogram appointment.  Order is not listed in EPIC.  Please enter order and contact patient to schedule.  Name of Caller:  self     Where would they like the mammogram performed?      Would the patient rather a call back or a response via [facility]? Call     Best Call Back Number:  [phone]      Additional Information:  would like to have mammo and bone dense - her gyn has been trying to send orders but there is none in chart

## 2022-09-30 ENCOUNTER — PATIENT MESSAGE (OUTPATIENT)
Dept: FAMILY MEDICINE | Facility: CLINIC | Age: 57
End: 2022-09-30
Payer: COMMERCIAL

## 2022-10-13 ENCOUNTER — LAB VISIT (OUTPATIENT)
Dept: LAB | Facility: HOSPITAL | Age: 57
End: 2022-10-13
Attending: INTERNAL MEDICINE
Payer: COMMERCIAL

## 2022-10-13 DIAGNOSIS — E78.5 HYPERLIPIDEMIA WITH TARGET LDL LESS THAN 100: ICD-10-CM

## 2022-10-13 DIAGNOSIS — E11.29 TYPE 2 DIABETES MELLITUS WITH PROTEINURIA: ICD-10-CM

## 2022-10-13 DIAGNOSIS — R80.9 TYPE 2 DIABETES MELLITUS WITH PROTEINURIA: ICD-10-CM

## 2022-10-13 DIAGNOSIS — I10 ESSENTIAL HYPERTENSION: ICD-10-CM

## 2022-10-13 LAB
ALBUMIN SERPL BCP-MCNC: 4 G/DL (ref 3.5–5.2)
ALP SERPL-CCNC: 84 U/L (ref 55–135)
ALT SERPL W/O P-5'-P-CCNC: 23 U/L (ref 10–44)
ANION GAP SERPL CALC-SCNC: 8 MMOL/L (ref 8–16)
AST SERPL-CCNC: 21 U/L (ref 10–40)
BASOPHILS # BLD AUTO: 0.06 K/UL (ref 0–0.2)
BASOPHILS NFR BLD: 0.7 % (ref 0–1.9)
BILIRUB SERPL-MCNC: 0.8 MG/DL (ref 0.1–1)
BUN SERPL-MCNC: 15 MG/DL (ref 6–20)
CALCIUM SERPL-MCNC: 10.3 MG/DL (ref 8.7–10.5)
CHLORIDE SERPL-SCNC: 103 MMOL/L (ref 95–110)
CO2 SERPL-SCNC: 29 MMOL/L (ref 23–29)
CREAT SERPL-MCNC: 1 MG/DL (ref 0.5–1.4)
DIFFERENTIAL METHOD: NORMAL
EOSINOPHIL # BLD AUTO: 0.2 K/UL (ref 0–0.5)
EOSINOPHIL NFR BLD: 2.4 % (ref 0–8)
ERYTHROCYTE [DISTWIDTH] IN BLOOD BY AUTOMATED COUNT: 12.4 % (ref 11.5–14.5)
EST. GFR  (NO RACE VARIABLE): >60 ML/MIN/1.73 M^2
ESTIMATED AVG GLUCOSE: 146 MG/DL (ref 68–131)
GLUCOSE SERPL-MCNC: 137 MG/DL (ref 70–110)
HBA1C MFR BLD: 6.7 % (ref 4–5.6)
HCT VFR BLD AUTO: 41.5 % (ref 37–48.5)
HGB BLD-MCNC: 13.3 G/DL (ref 12–16)
IMM GRANULOCYTES # BLD AUTO: 0.01 K/UL (ref 0–0.04)
IMM GRANULOCYTES NFR BLD AUTO: 0.1 % (ref 0–0.5)
LYMPHOCYTES # BLD AUTO: 3.3 K/UL (ref 1–4.8)
LYMPHOCYTES NFR BLD: 38.7 % (ref 18–48)
MCH RBC QN AUTO: 30.1 PG (ref 27–31)
MCHC RBC AUTO-ENTMCNC: 32 G/DL (ref 32–36)
MCV RBC AUTO: 94 FL (ref 82–98)
MONOCYTES # BLD AUTO: 0.6 K/UL (ref 0.3–1)
MONOCYTES NFR BLD: 7.4 % (ref 4–15)
NEUTROPHILS # BLD AUTO: 4.3 K/UL (ref 1.8–7.7)
NEUTROPHILS NFR BLD: 50.7 % (ref 38–73)
NRBC BLD-RTO: 0 /100 WBC
PLATELET # BLD AUTO: 289 K/UL (ref 150–450)
PMV BLD AUTO: 10.4 FL (ref 9.2–12.9)
POTASSIUM SERPL-SCNC: 4 MMOL/L (ref 3.5–5.1)
PROT SERPL-MCNC: 7.6 G/DL (ref 6–8.4)
RBC # BLD AUTO: 4.42 M/UL (ref 4–5.4)
SODIUM SERPL-SCNC: 140 MMOL/L (ref 136–145)
WBC # BLD AUTO: 8.39 K/UL (ref 3.9–12.7)

## 2022-10-13 PROCEDURE — 36415 COLL VENOUS BLD VENIPUNCTURE: CPT | Mod: PO | Performed by: INTERNAL MEDICINE

## 2022-10-13 PROCEDURE — 80053 COMPREHEN METABOLIC PANEL: CPT | Performed by: INTERNAL MEDICINE

## 2022-10-13 PROCEDURE — 83036 HEMOGLOBIN GLYCOSYLATED A1C: CPT | Performed by: INTERNAL MEDICINE

## 2022-10-13 PROCEDURE — 85025 COMPLETE CBC W/AUTO DIFF WBC: CPT | Performed by: INTERNAL MEDICINE

## 2022-10-17 ENCOUNTER — HOSPITAL ENCOUNTER (OUTPATIENT)
Dept: RADIOLOGY | Facility: HOSPITAL | Age: 57
Discharge: HOME | End: 2022-10-17
Attending: INTERNAL MEDICINE
Payer: COMMERCIAL

## 2022-10-17 ENCOUNTER — OFFICE VISIT (OUTPATIENT)
Dept: FAMILY MEDICINE | Facility: CLINIC | Age: 57
End: 2022-10-17
Payer: COMMERCIAL

## 2022-10-17 ENCOUNTER — HOSPITAL ENCOUNTER (OUTPATIENT)
Dept: RADIOLOGY | Facility: CLINIC | Age: 57
Discharge: HOME | End: 2022-10-17
Attending: INTERNAL MEDICINE
Payer: COMMERCIAL

## 2022-10-17 VITALS
DIASTOLIC BLOOD PRESSURE: 70 MMHG | SYSTOLIC BLOOD PRESSURE: 118 MMHG | BODY MASS INDEX: 27.64 KG/M2 | WEIGHT: 146.38 LBS | OXYGEN SATURATION: 93 % | HEART RATE: 69 BPM | HEIGHT: 61 IN | TEMPERATURE: 98 F

## 2022-10-17 DIAGNOSIS — R80.9 TYPE 2 DIABETES MELLITUS WITH PROTEINURIA: ICD-10-CM

## 2022-10-17 DIAGNOSIS — K90.0 CELIAC DISEASE: ICD-10-CM

## 2022-10-17 DIAGNOSIS — E78.5 HYPERLIPIDEMIA WITH TARGET LDL LESS THAN 100: ICD-10-CM

## 2022-10-17 DIAGNOSIS — Z00.00 ROUTINE MEDICAL EXAM: Primary | ICD-10-CM

## 2022-10-17 DIAGNOSIS — F41.9 ANXIETY: ICD-10-CM

## 2022-10-17 DIAGNOSIS — Z23 NEED FOR STREPTOCOCCUS PNEUMONIAE VACCINATION: ICD-10-CM

## 2022-10-17 DIAGNOSIS — G47.00 INSOMNIA, UNSPECIFIED TYPE: ICD-10-CM

## 2022-10-17 DIAGNOSIS — Z12.31 BREAST CANCER SCREENING BY MAMMOGRAM: ICD-10-CM

## 2022-10-17 DIAGNOSIS — I10 ESSENTIAL HYPERTENSION: ICD-10-CM

## 2022-10-17 DIAGNOSIS — K21.9 GASTROESOPHAGEAL REFLUX DISEASE, UNSPECIFIED WHETHER ESOPHAGITIS PRESENT: ICD-10-CM

## 2022-10-17 DIAGNOSIS — Z23 FLU VACCINE NEED: ICD-10-CM

## 2022-10-17 DIAGNOSIS — E11.29 TYPE 2 DIABETES MELLITUS WITH PROTEINURIA: ICD-10-CM

## 2022-10-17 DIAGNOSIS — Z78.0 MENOPAUSE: ICD-10-CM

## 2022-10-17 DIAGNOSIS — E66.3 OVERWEIGHT (BMI 25.0-29.9): ICD-10-CM

## 2022-10-17 PROCEDURE — 77080 DXA BONE DENSITY AXIAL: CPT | Mod: 26,,, | Performed by: INTERNAL MEDICINE

## 2022-10-17 PROCEDURE — 1159F PR MEDICATION LIST DOCUMENTED IN MEDICAL RECORD: ICD-10-PCS | Mod: CPTII,S$GLB,, | Performed by: INTERNAL MEDICINE

## 2022-10-17 PROCEDURE — 99999 PR PBB SHADOW E&M-EST. PATIENT-LVL IV: CPT | Mod: PBBFAC,,, | Performed by: INTERNAL MEDICINE

## 2022-10-17 PROCEDURE — 3044F PR MOST RECENT HEMOGLOBIN A1C LEVEL <7.0%: ICD-10-PCS | Mod: CPTII,S$GLB,, | Performed by: INTERNAL MEDICINE

## 2022-10-17 PROCEDURE — 4010F PR ACE/ARB THEARPY RXD/TAKEN: ICD-10-PCS | Mod: CPTII,S$GLB,, | Performed by: INTERNAL MEDICINE

## 2022-10-17 PROCEDURE — 3066F PR DOCUMENTATION OF TREATMENT FOR NEPHROPATHY: ICD-10-PCS | Mod: CPTII,S$GLB,, | Performed by: INTERNAL MEDICINE

## 2022-10-17 PROCEDURE — 1159F MED LIST DOCD IN RCRD: CPT | Mod: CPTII,S$GLB,, | Performed by: INTERNAL MEDICINE

## 2022-10-17 PROCEDURE — 3074F PR MOST RECENT SYSTOLIC BLOOD PRESSURE < 130 MM HG: ICD-10-PCS | Mod: CPTII,S$GLB,, | Performed by: INTERNAL MEDICINE

## 2022-10-17 PROCEDURE — 90677 PNEUMOCOCCAL CONJUGATE VACCINE 20-VALENT: ICD-10-PCS | Mod: S$GLB,,, | Performed by: INTERNAL MEDICINE

## 2022-10-17 PROCEDURE — 77080 DEXA BONE DENSITY SPINE HIP: ICD-10-PCS | Mod: 26,,, | Performed by: INTERNAL MEDICINE

## 2022-10-17 PROCEDURE — 77080 DXA BONE DENSITY AXIAL: CPT | Mod: TC,PO

## 2022-10-17 PROCEDURE — 3060F PR POS MICROALBUMINURIA RESULT DOCUMENTED/REVIEW: ICD-10-PCS | Mod: CPTII,S$GLB,, | Performed by: INTERNAL MEDICINE

## 2022-10-17 PROCEDURE — 77063 BREAST TOMOSYNTHESIS BI: CPT | Mod: 26,,, | Performed by: RADIOLOGY

## 2022-10-17 PROCEDURE — 99396 PREV VISIT EST AGE 40-64: CPT | Mod: 25,S$GLB,, | Performed by: INTERNAL MEDICINE

## 2022-10-17 PROCEDURE — 3078F DIAST BP <80 MM HG: CPT | Mod: CPTII,S$GLB,, | Performed by: INTERNAL MEDICINE

## 2022-10-17 PROCEDURE — 77067 SCR MAMMO BI INCL CAD: CPT | Mod: 26,,, | Performed by: RADIOLOGY

## 2022-10-17 PROCEDURE — 77067 MAMMO DIGITAL SCREENING BILAT WITH TOMO: ICD-10-PCS | Mod: 26,,, | Performed by: RADIOLOGY

## 2022-10-17 PROCEDURE — 3078F PR MOST RECENT DIASTOLIC BLOOD PRESSURE < 80 MM HG: ICD-10-PCS | Mod: CPTII,S$GLB,, | Performed by: INTERNAL MEDICINE

## 2022-10-17 PROCEDURE — 3074F SYST BP LT 130 MM HG: CPT | Mod: CPTII,S$GLB,, | Performed by: INTERNAL MEDICINE

## 2022-10-17 PROCEDURE — 99999 PR PBB SHADOW E&M-EST. PATIENT-LVL IV: ICD-10-PCS | Mod: PBBFAC,,, | Performed by: INTERNAL MEDICINE

## 2022-10-17 PROCEDURE — 99396 PR PREVENTIVE VISIT,EST,40-64: ICD-10-PCS | Mod: 25,S$GLB,, | Performed by: INTERNAL MEDICINE

## 2022-10-17 PROCEDURE — 1160F PR REVIEW ALL MEDS BY PRESCRIBER/CLIN PHARMACIST DOCUMENTED: ICD-10-PCS | Mod: CPTII,S$GLB,, | Performed by: INTERNAL MEDICINE

## 2022-10-17 PROCEDURE — 3060F POS MICROALBUMINURIA REV: CPT | Mod: CPTII,S$GLB,, | Performed by: INTERNAL MEDICINE

## 2022-10-17 PROCEDURE — 90471 IMMUNIZATION ADMIN: CPT | Mod: S$GLB,,, | Performed by: INTERNAL MEDICINE

## 2022-10-17 PROCEDURE — 3044F HG A1C LEVEL LT 7.0%: CPT | Mod: CPTII,S$GLB,, | Performed by: INTERNAL MEDICINE

## 2022-10-17 PROCEDURE — 77063 BREAST TOMOSYNTHESIS BI: CPT | Mod: TC,PO

## 2022-10-17 PROCEDURE — 90677 PCV20 VACCINE IM: CPT | Mod: S$GLB,,, | Performed by: INTERNAL MEDICINE

## 2022-10-17 PROCEDURE — 90471 PNEUMOCOCCAL CONJUGATE VACCINE 20-VALENT: ICD-10-PCS | Mod: S$GLB,,, | Performed by: INTERNAL MEDICINE

## 2022-10-17 PROCEDURE — 77063 MAMMO DIGITAL SCREENING BILAT WITH TOMO: ICD-10-PCS | Mod: 26,,, | Performed by: RADIOLOGY

## 2022-10-17 PROCEDURE — 4010F ACE/ARB THERAPY RXD/TAKEN: CPT | Mod: CPTII,S$GLB,, | Performed by: INTERNAL MEDICINE

## 2022-10-17 PROCEDURE — 1160F RVW MEDS BY RX/DR IN RCRD: CPT | Mod: CPTII,S$GLB,, | Performed by: INTERNAL MEDICINE

## 2022-10-17 PROCEDURE — 3066F NEPHROPATHY DOC TX: CPT | Mod: CPTII,S$GLB,, | Performed by: INTERNAL MEDICINE

## 2022-10-17 RX ORDER — TRAZODONE HYDROCHLORIDE 50 MG/1
50 TABLET ORAL NIGHTLY
Qty: 90 TABLET | Refills: 1 | Status: SHIPPED | OUTPATIENT
Start: 2022-10-17 | End: 2023-11-09

## 2022-10-17 RX ORDER — ATORVASTATIN CALCIUM 20 MG/1
20 TABLET, FILM COATED ORAL DAILY
Qty: 90 TABLET | Refills: 1 | Status: SHIPPED | OUTPATIENT
Start: 2022-10-17 | End: 2023-02-27

## 2022-10-17 NOTE — PROGRESS NOTES
HISTORY OF PRESENT ILLNESS:  Vijaya Montenegro is a 56 y.o. female who presents to the clinic today for a routine physical exam. Her last physical exam was lw pe time; ago: approximately 1 years(s) ago.        PAST MEDICAL HISTORY:  Past Medical History:   Diagnosis Date    Anxiety     Celiac disease 2021    GERD (gastroesophageal reflux disease)     Gout, arthritis     H. pylori infection     treated    Hyperlipidemia LDL goal < 100     Hypertension     Obesity     Type II or unspecified type diabetes mellitus without mention of complication, not stated as uncontrolled     diet controlled       PAST SURGICAL HISTORY:  Past Surgical History:   Procedure Laterality Date     SECTION, LOW TRANSVERSE      x2    REFRACTIVE SURGERY Bilateral     [facility]       SOCIAL HISTORY:  Social History     Socioeconomic History    Marital status:     Number of children: 2   Tobacco Use    Smoking status: Never    Smokeless tobacco: Never   Substance and Sexual Activity    Alcohol use: Yes    Drug use: No    Sexual activity: Yes     Partners: Male       FAMILY HISTORY:  Family History   Problem Relation Age of Onset    Hypertension Father     Bladder Cancer Father     Cancer Father         bladder    Diabetes Sister     Heart attack Maternal Grandmother     Cataracts Maternal Grandmother     COPD Mother     Blindness Mother         left eye; eye injury    Thyroid disease Mother     No Known Problems Maternal Grandfather     Diabetes Paternal Grandmother     No Known Problems Paternal Grandfather     Diabetes Paternal Aunt     Diabetes Paternal Aunt     No Known Problems Maternal Aunt     No Known Problems Maternal Uncle     No Known Problems Paternal Uncle     Breast cancer Maternal Cousin     Colon cancer Neg Hx     Amblyopia Neg Hx     Glaucoma Neg Hx     Macular degeneration Neg Hx     Retinal detachment Neg Hx     Strabismus Neg Hx     Stroke Neg Hx        ALLERGIES AND MEDICATIONS:  updated and reviewed.  Review of patient's allergies indicates:   Allergen Reactions    Codeine Other (See Comments)     Medication List with Changes/Refills   New Medications    TRAZODONE (DESYREL) 50 MG TABLET    Take 1 tablet (50 mg total) by mouth every evening.   Current Medications    ALPRAZOLAM (XANAX) 0.25 MG TABLET    Take 1 tablet (0.25 mg total) by mouth daily as needed for Anxiety.    HYDROCHLOROTHIAZIDE (HYDRODIURIL) 25 MG TABLET    Take 1 tablet (25 mg total) by mouth once daily.    IRBESARTAN (AVAPRO) 75 MG TABLET    TAKE 1 TABLET(S) BY MOUTH IN THE EVENING    KETOROLAC (TORADOL) 10 MG TABLET    Take 1 tablet (10 mg total) by mouth every 6 (six) hours as needed (gout). Take with food.    LANCETS 31 GAUGE MISC    1 lancet by Misc.(Non-Drug; Combo Route) route 2 (two) times daily. Test BID    METFORMIN (GLUCOPHAGE-XR) 500 MG ER 24HR TABLET    Take 2 tablets (1,000 mg total) by mouth once daily.    METOPROLOL TARTRATE (LOPRESSOR) 50 MG TABLET    TAKE 1 TABLET BY MOUTH 3 TIMES A DAY   Changed and/or Refilled Medications    Modified Medication Previous Medication    ATORVASTATIN (LIPITOR) 20 MG TABLET atorvastatin (LIPITOR) 20 MG tablet       Take 1 tablet (20 mg total) by mouth once daily.    Take 1 tablet (20 mg total) by mouth once daily.          CARE TEAM:  Patient Care Team:  Lanette Hoover MD as PCP - General (Internal Medicine)  Chidi Easley II, MD as PCP - OBGYN (Obstetrics)  Lanette Hoover MD as Hypertension Digital Medicine Responsible Provider (Internal Medicine)  Lanette Hoover MD as Diabetes Digital Medicine Responsible Provider (Internal Medicine)  Dalila Samuels LPN as Licensed Practical Nurse  Dick Mendez OD as Consulting Physician (Ophthalmology)  Rodolfo Tubbs PA-C as Hypertension Digital Medicine Clinician (Physician Assistant)  Rodolfo Tubbs PA-C as Diabetes Digital Medicine Clinician (Physician Assistant)  Anika Rocha as Digital Medicine  Health            SCREENING HISTORY:  Health Maintenance         Date Due Completion Date    Pneumococcal Vaccines (Age 0-64) (2 - PCV) 08/14/2014 8/14/2013    COVID-19 Vaccine (3 - Booster) 06/07/2021 4/12/2021    Eye Exam 07/20/2022 7/20/2021    Override on 8/15/2016: Done    Override on 6/10/2014: Done (normal per patient)    Foot Exam 09/20/2022 9/20/2021    Diabetes Urine Screening 04/09/2023 4/9/2022    Lipid Panel 04/09/2023 4/9/2022    Override on 11/10/2014: Done (Total cholesterol 205, HDL 52, triglycerides 252, and )    Hemoglobin A1c 04/13/2023 10/13/2022    Override on 11/10/2014: Done ([facility] - 6.6)    TETANUS VACCINE 07/05/2023 7/5/2013    Mammogram 07/20/2023 7/20/2021    Override on 2/27/2017: Done (done at [facility])    Override on 2/1/2014: Done    Override on 10/3/2011: Done    Override on 12/5/2006: Done    Low Dose Statin 10/17/2023 10/17/2022    Cervical Cancer Screening 03/31/2024 3/31/2021    Override on 1/19/2015: Done ([facility] Claims)    Override on 10/1/2012: Done    Override on 11/16/2006: Done    Colorectal Cancer Screening 05/17/2027 5/17/2017              REVIEW OF SYSTEMS:   The patient reports : good dietary habits.  The patient reports  : that they exercise regularly - she walks about 1 1/2 miles 4 days per week.  Review of Systems   Constitutional:  Negative for chills, fatigue, fever and unexpected weight change.   HENT:  Negative for congestion and postnasal drip.    Eyes:  Negative for pain and visual disturbance.   Respiratory:  Negative for cough, shortness of breath and wheezing.    Cardiovascular:  Negative for chest pain, palpitations and leg swelling.   Gastrointestinal:  Negative for abdominal pain, constipation, diarrhea, nausea and vomiting.   Genitourinary:  Negative for dysuria.   Musculoskeletal:  Negative for arthralgias and back pain.   Skin:  Negative for rash.   Neurological:  Negative for weakness and headaches.   Psychiatric/Behavioral:   "Positive for sleep disturbance (- she reports some family stressors at this time and is taking half a xanax to help her sleep). Negative for dysphoric mood. The patient is nervous/anxious.     ROS (Optional)-: no pelvic pain  Breast ROS (Optional)-: negative for breast lumps/discharge          Physical Examination: 274}  Vitals:    10/17/22 0825   BP: 118/70   Pulse: 69   Temp: 98.1 °F (36.7 °C)     Weight: 66.4 kg (146 lb 6.2 oz)   Height: 5' 1" (154.9 cm)   Body mass index is 27.66 kg/m².      Patient did not require to have a chaperone present during the exam today.    General appearance - alert, well appearing, and in no distress, overweight  Psychiatric - alert, oriented to person, place, and time, normal behavior, speech, dress, motor activity and thought process  Eyes - pupils equal and reactive, extraocular eye movements intact, sclera anicteric  Mouth - not examined  Neck - supple, no significant adenopathy, carotids upstroke normal bilaterally, no bruits  Lymphatics - no palpable cervical lymphadenopathy  Chest - clear to auscultation, no wheezes, rales or rhonchi, symmetric air entry  Heart - normal rate and regular rhythm, no gallops noted  Neurological - alert, normal speech, no focal findings or movement disorder noted; cranial nerves II through XII intact  Musculoskeletal - no joint tenderness, deformity or swelling, no muscular tenderness noted  Extremities - peripheral pulses normal, no pedal edema, no clubbing or cyanosis  Skin - normal coloration, no suspicious skin lesions  Protective Sensation (w/ 10 gram monofilament):  Right: Intact  Left: Intact    Visual Inspection:  Normal -  Bilateral    Pedal Pulses:   Right: Present  Left: Present    Posterior tibialis:   Right:Present  Left: Present        Labs:  Lab Results   Component Value Date    HGBA1C 6.7 (H) 10/13/2022    HGBA1C 6.9 (H) 04/09/2022    HGBA1C 7.2 (H) 08/03/2021      Lab Results   Component Value Date    CHOL 143 04/09/2022    " CHOL 168 08/03/2021    CHOL 202 (H) 01/22/2020     Lab Results   Component Value Date    LDLCALC 79.4 04/09/2022    LDLCALC 96.2 08/03/2021    LDLCALC 110.6 01/22/2020         ASSESSMENT AND PLAN:  274}  1. Routine medical exam  Counseled on age appropriate medical preventative services including age appropriate cancer screenings, age appropriate eye and dental exams, over all nutritional health, need for a consistent exercise regimen, and an over all push towards maintaining a vigorous and active lifestyle.  Counseled on age appropriate vaccines and discussed upcoming health care needs based on age/gender. Discussed good sleep hygiene and stress management.      2. Type 2 diabetes mellitus with proteinuria  Lab Results   Component Value Date    HGBA1C 6.7 (H) 10/13/2022     Diabetes is under good control at this time for age and comorbid conditions.   We discussed diabetic diet and regular exercise.   We discussed home blood sugar monitoring, if appropriate - the patient should test once daily and as needed.   Continue current medication regimen. Recheck A1c in 6 months.  Diabetic complications addressed: None addressed today.  Patient was counseled on the need for yearly eye exam to screen for/monitor diabetic retinopathy and yearly diabetic foot exam.    -     Hemoglobin A1C; Future; Expected date: 04/17/2023  -     Microalbumin/Creatinine Ratio, Urine; Future; Expected date: 04/17/2023    3. Essential hypertension  The current medical regimen is effective;  continue present plan and medications. Recommended patient to check home readings to monitor and see me for followup as scheduled or sooner as needed.   Discussed sodium restriction, maintaining ideal body weight and regular exercise program as physiologic means to continue to achieve blood pressure control in addition to medication compliance.  Patient was educated that both decongestant and anti-inflammatory medication may raise blood pressure.  The patient  is active on the digital hypertension program.    -     CBC Auto Differential; Future; Expected date: 04/17/2023    4. Hyperlipidemia with target LDL less than 100  Lab Results   Component Value Date    LDLCALC 79.4 04/09/2022     We discussed low fat diet and regular exercise.The current medical regimen is effective;  continue present plan and medications.     -     atorvastatin (LIPITOR) 20 MG tablet; Take 1 tablet (20 mg total) by mouth once daily.  Dispense: 90 tablet; Refill: 1  -     Lipid Panel; Future; Expected date: 04/17/2023  -     Comprehensive Metabolic Panel; Future; Expected date: 04/17/2023    5. Gastroesophageal reflux disease, unspecified whether esophagitis present/6. Celiac disease  The current medical regimen is effective;  continue present plan and medications.   Followed by: Gastroenterology.       7. Overweight (BMI 25.0-29.9)  BMI Readings from Last 3 Encounters:   10/17/22 27.66 kg/m²   04/13/22 27.89 kg/m²   09/20/21 27.76 kg/m²     The patient is asked to continue to make an attempt to improve diet and exercise patterns to aid in medical management of this problem.       8. Flu vaccine need  Recently completed. Chart updated.    9. Need for Streptococcus pneumoniae vaccination    -     (In Office Administered) Pneumococcal Conjugate Vaccine (20 Valent) (IM)    10. Anxiety/11. Insomnia, unspecified type  I will prescribe trazodone that she can take nightly as needed for anxiety and insomnia. Reserve xanax for prn anxiety. She will let me know if this does not work for her.  Patient counseled that xanax can be an addicting medication.  Patient was counseled that a recent study showed that the chronic use of anxiolytic medication like xanax may increase the risk for developing dementia.  Patient was warned of the sedating properties of trazodone and xanax medication and was advised to abstain from driving, operating heavy machinery, etc if they feel drowsy.    -     traZODone (DESYREL) 50 MG  tablet; Take 1 tablet (50 mg total) by mouth every evening.  Dispense: 90 tablet; Refill: 1             Orders Placed This Encounter   Procedures    (In Office Administered) Pneumococcal Conjugate Vaccine (20 Valent) (IM)    Hemoglobin A1C    Lipid Panel    Comprehensive Metabolic Panel    CBC Auto Differential    Microalbumin/Creatinine Ratio, Urine      Follow up in about 6 months (around 4/17/2023), or if symptoms worsen or fail to improve, for follow up chronic medical conditions.. or sooner as needed.

## 2022-10-18 PROBLEM — Z78.0 OSTEOPENIA AFTER MENOPAUSE: Status: ACTIVE | Noted: 2022-10-18

## 2022-10-18 PROBLEM — M85.80 OSTEOPENIA AFTER MENOPAUSE: Status: ACTIVE | Noted: 2022-10-18

## 2022-11-04 LAB
LEFT EYE DM RETINOPATHY: NEGATIVE
RIGHT EYE DM RETINOPATHY: NEGATIVE

## 2022-12-07 DIAGNOSIS — R80.9 TYPE 2 DIABETES MELLITUS WITH PROTEINURIA: ICD-10-CM

## 2022-12-07 DIAGNOSIS — E11.29 TYPE 2 DIABETES MELLITUS WITH PROTEINURIA: ICD-10-CM

## 2022-12-07 NOTE — TELEPHONE ENCOUNTER
No new care gaps identified.  [facility] Embedded Care Gaps. Reference number: [id number]. 12/07/2022   10:39:25 AM CST

## 2022-12-08 ENCOUNTER — TELEPHONE (OUTPATIENT)
Dept: FAMILY MEDICINE | Facility: CLINIC | Age: 57
End: 2022-12-08
Payer: COMMERCIAL

## 2022-12-08 RX ORDER — METFORMIN HYDROCHLORIDE 500 MG/1
TABLET, EXTENDED RELEASE ORAL
Qty: 180 TABLET | Refills: 1 | Status: SHIPPED | OUTPATIENT
Start: 2022-12-08 | End: 2023-04-17 | Stop reason: SDUPTHER

## 2022-12-08 NOTE — TELEPHONE ENCOUNTER
Refill Decision Note   Vijaay Montenegro  is requesting a refill authorization.  Brief Assessment and Rationale for Refill:  Approve     Medication Therapy Plan:  Rx sent to home pharmacy.    Medication Reconciliation Completed: No   Comments:     No Care Gaps recommended.     Note composed:11:05 AM 12/08/2022

## 2022-12-08 NOTE — TELEPHONE ENCOUNTER
Please call patient: has she completed her diabetic eye exam this year (if not, please schedule to see optometry or she could do diabetic eyecam).

## 2023-01-09 DIAGNOSIS — I10 ESSENTIAL HYPERTENSION: ICD-10-CM

## 2023-01-09 RX ORDER — IRBESARTAN 75 MG/1
TABLET ORAL
Qty: 90 TABLET | Refills: 3 | Status: SHIPPED | OUTPATIENT
Start: 2023-01-09 | End: 2023-04-17 | Stop reason: SDUPTHER

## 2023-01-09 NOTE — TELEPHONE ENCOUNTER
Care Due:                  Date            Visit Type   Department     Provider  --------------------------------------------------------------------------------                                 -         [facility]                              PRIMARY      [facility]  Last Visit: 10-      CARE ([facility])   MED/ PEDS      Darienkeldarrel Hoover                              [facility]                              PRIMARY      MED/ INTERNAL  [facility] [place]  Next Visit: 04-      CARE ([facility])   MED/ PEDS      Darienkeler  Sneha                                                            Last  Test          Frequency    Reason                     Performed    Due Date  --------------------------------------------------------------------------------    Lipid Panel.  12 months..  atorvastatin.............  04- 04-    [employer] Embedded Care Gaps. Reference number: [id number]. 1/09/2023   8:47:40 AM CST

## 2023-01-09 NOTE — TELEPHONE ENCOUNTER
Refill Decision Note   Vijaya Montenegro  is requesting a refill authorization.  Brief Assessment and Rationale for Refill:  Defer    -Medication-Related Problems Identified: Requires labs  Medication Therapy Plan:       Medication Reconciliation Completed: No   Comments:     Provider Staff:     Action is required for this patient.   Please see care gap opportunities below in Care Due Message.     Thanks!  [facility]     Appointments      Date Provider   Last Visit   10/17/2022 Lanette Hoover MD   Next Visit   4/17/2023 Lanette Hoover MD     Note composed:1:22 PM 01/09/2023            Note composed:1:22 PM 01/09/2023

## 2023-02-02 ENCOUNTER — PATIENT MESSAGE (OUTPATIENT)
Dept: ADMINISTRATIVE | Facility: OTHER | Age: 58
End: 2023-02-02
Payer: COMMERCIAL

## 2023-04-10 ENCOUNTER — LAB VISIT (OUTPATIENT)
Dept: LAB | Facility: HOSPITAL | Age: 58
End: 2023-04-10
Attending: INTERNAL MEDICINE
Payer: COMMERCIAL

## 2023-04-10 DIAGNOSIS — I10 ESSENTIAL HYPERTENSION: ICD-10-CM

## 2023-04-10 DIAGNOSIS — E11.29 TYPE 2 DIABETES MELLITUS WITH PROTEINURIA: ICD-10-CM

## 2023-04-10 DIAGNOSIS — E78.5 HYPERLIPIDEMIA WITH TARGET LDL LESS THAN 100: ICD-10-CM

## 2023-04-10 DIAGNOSIS — R80.9 TYPE 2 DIABETES MELLITUS WITH PROTEINURIA: ICD-10-CM

## 2023-04-10 LAB
ALBUMIN SERPL BCP-MCNC: 3.8 G/DL (ref 3.5–5.2)
ALP SERPL-CCNC: 77 U/L (ref 55–135)
ALT SERPL W/O P-5'-P-CCNC: 20 U/L (ref 10–44)
ANION GAP SERPL CALC-SCNC: 10 MMOL/L (ref 8–16)
AST SERPL-CCNC: 19 U/L (ref 10–40)
BASOPHILS # BLD AUTO: 0.05 K/UL (ref 0–0.2)
BASOPHILS NFR BLD: 0.6 % (ref 0–1.9)
BILIRUB SERPL-MCNC: 0.6 MG/DL (ref 0.1–1)
BUN SERPL-MCNC: 14 MG/DL (ref 6–20)
CALCIUM SERPL-MCNC: 10.6 MG/DL (ref 8.7–10.5)
CHLORIDE SERPL-SCNC: 101 MMOL/L (ref 95–110)
CHOLEST SERPL-MCNC: 148 MG/DL (ref 120–199)
CHOLEST/HDLC SERPL: 3 {RATIO} (ref 2–5)
CO2 SERPL-SCNC: 26 MMOL/L (ref 23–29)
CREAT SERPL-MCNC: 0.9 MG/DL (ref 0.5–1.4)
DIFFERENTIAL METHOD: ABNORMAL
EOSINOPHIL # BLD AUTO: 0.2 K/UL (ref 0–0.5)
EOSINOPHIL NFR BLD: 1.9 % (ref 0–8)
ERYTHROCYTE [DISTWIDTH] IN BLOOD BY AUTOMATED COUNT: 12.5 % (ref 11.5–14.5)
EST. GFR  (NO RACE VARIABLE): >60 ML/MIN/1.73 M^2
ESTIMATED AVG GLUCOSE: 148 MG/DL (ref 68–131)
GLUCOSE SERPL-MCNC: 151 MG/DL (ref 70–110)
HBA1C MFR BLD: 6.8 % (ref 4–5.6)
HCT VFR BLD AUTO: 42.2 % (ref 37–48.5)
HDLC SERPL-MCNC: 50 MG/DL (ref 40–75)
HDLC SERPL: 33.8 % (ref 20–50)
HGB BLD-MCNC: 13.1 G/DL (ref 12–16)
IMM GRANULOCYTES # BLD AUTO: 0.02 K/UL (ref 0–0.04)
IMM GRANULOCYTES NFR BLD AUTO: 0.3 % (ref 0–0.5)
LDLC SERPL CALC-MCNC: 84.8 MG/DL (ref 63–159)
LYMPHOCYTES # BLD AUTO: 3.2 K/UL (ref 1–4.8)
LYMPHOCYTES NFR BLD: 39.5 % (ref 18–48)
MCH RBC QN AUTO: 29.6 PG (ref 27–31)
MCHC RBC AUTO-ENTMCNC: 31 G/DL (ref 32–36)
MCV RBC AUTO: 96 FL (ref 82–98)
MONOCYTES # BLD AUTO: 0.6 K/UL (ref 0.3–1)
MONOCYTES NFR BLD: 7.6 % (ref 4–15)
NEUTROPHILS # BLD AUTO: 4 K/UL (ref 1.8–7.7)
NEUTROPHILS NFR BLD: 50.1 % (ref 38–73)
NONHDLC SERPL-MCNC: 98 MG/DL
NRBC BLD-RTO: 0 /100 WBC
PLATELET # BLD AUTO: 279 K/UL (ref 150–450)
PMV BLD AUTO: 10.6 FL (ref 9.2–12.9)
POTASSIUM SERPL-SCNC: 4.1 MMOL/L (ref 3.5–5.1)
PROT SERPL-MCNC: 7.4 G/DL (ref 6–8.4)
RBC # BLD AUTO: 4.42 M/UL (ref 4–5.4)
SODIUM SERPL-SCNC: 137 MMOL/L (ref 136–145)
TRIGL SERPL-MCNC: 66 MG/DL (ref 30–150)
WBC # BLD AUTO: 8 K/UL (ref 3.9–12.7)

## 2023-04-10 PROCEDURE — 83036 HEMOGLOBIN GLYCOSYLATED A1C: CPT | Performed by: INTERNAL MEDICINE

## 2023-04-10 PROCEDURE — 80053 COMPREHEN METABOLIC PANEL: CPT | Performed by: INTERNAL MEDICINE

## 2023-04-10 PROCEDURE — 80061 LIPID PANEL: CPT | Performed by: INTERNAL MEDICINE

## 2023-04-10 PROCEDURE — 36415 COLL VENOUS BLD VENIPUNCTURE: CPT | Mod: PO | Performed by: INTERNAL MEDICINE

## 2023-04-10 PROCEDURE — 85025 COMPLETE CBC W/AUTO DIFF WBC: CPT | Performed by: INTERNAL MEDICINE

## 2023-04-17 ENCOUNTER — OFFICE VISIT (OUTPATIENT)
Dept: FAMILY MEDICINE | Facility: CLINIC | Age: 58
End: 2023-04-17
Payer: COMMERCIAL

## 2023-04-17 ENCOUNTER — PATIENT OUTREACH (OUTPATIENT)
Dept: ADMINISTRATIVE | Facility: HOSPITAL | Age: 58
End: 2023-04-17
Payer: COMMERCIAL

## 2023-04-17 VITALS
DIASTOLIC BLOOD PRESSURE: 74 MMHG | TEMPERATURE: 98 F | OXYGEN SATURATION: 96 % | SYSTOLIC BLOOD PRESSURE: 120 MMHG | HEART RATE: 61 BPM | WEIGHT: 146.5 LBS | HEIGHT: 61 IN | BODY MASS INDEX: 27.66 KG/M2

## 2023-04-17 DIAGNOSIS — R80.9 TYPE 2 DIABETES MELLITUS WITH PROTEINURIA: Primary | ICD-10-CM

## 2023-04-17 DIAGNOSIS — K21.9 GASTROESOPHAGEAL REFLUX DISEASE WITHOUT ESOPHAGITIS: ICD-10-CM

## 2023-04-17 DIAGNOSIS — E11.29 TYPE 2 DIABETES MELLITUS WITH PROTEINURIA: Primary | ICD-10-CM

## 2023-04-17 DIAGNOSIS — M85.80 OSTEOPENIA AFTER MENOPAUSE: ICD-10-CM

## 2023-04-17 DIAGNOSIS — Z78.0 OSTEOPENIA AFTER MENOPAUSE: ICD-10-CM

## 2023-04-17 DIAGNOSIS — M10.9 GOUT, ARTHRITIS: ICD-10-CM

## 2023-04-17 DIAGNOSIS — E78.5 HYPERLIPIDEMIA WITH TARGET LDL LESS THAN 100: ICD-10-CM

## 2023-04-17 DIAGNOSIS — I10 ESSENTIAL HYPERTENSION: ICD-10-CM

## 2023-04-17 DIAGNOSIS — K90.0 CELIAC DISEASE: ICD-10-CM

## 2023-04-17 DIAGNOSIS — E66.3 OVERWEIGHT (BMI 25.0-29.9): ICD-10-CM

## 2023-04-17 PROCEDURE — 3066F NEPHROPATHY DOC TX: CPT | Mod: CPTII,S$GLB,, | Performed by: INTERNAL MEDICINE

## 2023-04-17 PROCEDURE — 3008F PR BODY MASS INDEX (BMI) DOCUMENTED: ICD-10-PCS | Mod: CPTII,S$GLB,, | Performed by: INTERNAL MEDICINE

## 2023-04-17 PROCEDURE — 99214 OFFICE O/P EST MOD 30 MIN: CPT | Mod: S$GLB,,, | Performed by: INTERNAL MEDICINE

## 2023-04-17 PROCEDURE — 1160F PR REVIEW ALL MEDS BY PRESCRIBER/CLIN PHARMACIST DOCUMENTED: ICD-10-PCS | Mod: CPTII,S$GLB,, | Performed by: INTERNAL MEDICINE

## 2023-04-17 PROCEDURE — 3074F PR MOST RECENT SYSTOLIC BLOOD PRESSURE < 130 MM HG: ICD-10-PCS | Mod: CPTII,S$GLB,, | Performed by: INTERNAL MEDICINE

## 2023-04-17 PROCEDURE — 99214 PR OFFICE/OUTPT VISIT, EST, LEVL IV, 30-39 MIN: ICD-10-PCS | Mod: S$GLB,,, | Performed by: INTERNAL MEDICINE

## 2023-04-17 PROCEDURE — 3078F PR MOST RECENT DIASTOLIC BLOOD PRESSURE < 80 MM HG: ICD-10-PCS | Mod: CPTII,S$GLB,, | Performed by: INTERNAL MEDICINE

## 2023-04-17 PROCEDURE — 3078F DIAST BP <80 MM HG: CPT | Mod: CPTII,S$GLB,, | Performed by: INTERNAL MEDICINE

## 2023-04-17 PROCEDURE — 4010F ACE/ARB THERAPY RXD/TAKEN: CPT | Mod: CPTII,S$GLB,, | Performed by: INTERNAL MEDICINE

## 2023-04-17 PROCEDURE — 3044F HG A1C LEVEL LT 7.0%: CPT | Mod: CPTII,S$GLB,, | Performed by: INTERNAL MEDICINE

## 2023-04-17 PROCEDURE — 1159F MED LIST DOCD IN RCRD: CPT | Mod: CPTII,S$GLB,, | Performed by: INTERNAL MEDICINE

## 2023-04-17 PROCEDURE — 4010F PR ACE/ARB THEARPY RXD/TAKEN: ICD-10-PCS | Mod: CPTII,S$GLB,, | Performed by: INTERNAL MEDICINE

## 2023-04-17 PROCEDURE — 3066F PR DOCUMENTATION OF TREATMENT FOR NEPHROPATHY: ICD-10-PCS | Mod: CPTII,S$GLB,, | Performed by: INTERNAL MEDICINE

## 2023-04-17 PROCEDURE — 3074F SYST BP LT 130 MM HG: CPT | Mod: CPTII,S$GLB,, | Performed by: INTERNAL MEDICINE

## 2023-04-17 PROCEDURE — 1160F RVW MEDS BY RX/DR IN RCRD: CPT | Mod: CPTII,S$GLB,, | Performed by: INTERNAL MEDICINE

## 2023-04-17 PROCEDURE — 99999 PR PBB SHADOW E&M-EST. PATIENT-LVL IV: ICD-10-PCS | Mod: PBBFAC,,, | Performed by: INTERNAL MEDICINE

## 2023-04-17 PROCEDURE — 1159F PR MEDICATION LIST DOCUMENTED IN MEDICAL RECORD: ICD-10-PCS | Mod: CPTII,S$GLB,, | Performed by: INTERNAL MEDICINE

## 2023-04-17 PROCEDURE — 99999 PR PBB SHADOW E&M-EST. PATIENT-LVL IV: CPT | Mod: PBBFAC,,, | Performed by: INTERNAL MEDICINE

## 2023-04-17 PROCEDURE — 3061F PR NEG MICROALBUMINURIA RESULT DOCUMENTED/REVIEW: ICD-10-PCS | Mod: CPTII,S$GLB,, | Performed by: INTERNAL MEDICINE

## 2023-04-17 PROCEDURE — 3061F NEG MICROALBUMINURIA REV: CPT | Mod: CPTII,S$GLB,, | Performed by: INTERNAL MEDICINE

## 2023-04-17 PROCEDURE — 3008F BODY MASS INDEX DOCD: CPT | Mod: CPTII,S$GLB,, | Performed by: INTERNAL MEDICINE

## 2023-04-17 PROCEDURE — 3044F PR MOST RECENT HEMOGLOBIN A1C LEVEL <7.0%: ICD-10-PCS | Mod: CPTII,S$GLB,, | Performed by: INTERNAL MEDICINE

## 2023-04-17 RX ORDER — ATORVASTATIN CALCIUM 20 MG/1
20 TABLET, FILM COATED ORAL DAILY
Qty: 90 TABLET | Refills: 1 | Status: SHIPPED | OUTPATIENT
Start: 2023-04-17 | End: 2023-10-18 | Stop reason: SDUPTHER

## 2023-04-17 RX ORDER — METFORMIN HYDROCHLORIDE 500 MG/1
1000 TABLET, EXTENDED RELEASE ORAL DAILY
Qty: 180 TABLET | Refills: 1 | Status: SHIPPED | OUTPATIENT
Start: 2023-04-17 | End: 2023-10-18 | Stop reason: SDUPTHER

## 2023-04-17 RX ORDER — IRBESARTAN 75 MG/1
TABLET ORAL
Qty: 90 TABLET | Refills: 1 | Status: SHIPPED | OUTPATIENT
Start: 2023-04-17 | End: 2023-10-18 | Stop reason: SDUPTHER

## 2023-04-17 RX ORDER — METOPROLOL TARTRATE 50 MG/1
50 TABLET ORAL 3 TIMES DAILY
Qty: 270 TABLET | Refills: 1 | Status: SHIPPED | OUTPATIENT
Start: 2023-04-17 | End: 2023-10-18 | Stop reason: SDUPTHER

## 2023-04-17 NOTE — LETTER
AUTHORIZATION FOR RELEASE OF   CONFIDENTIAL INFORMATION    Dear [facility],    We are seeing Vijaya Montenegro, date of birth 1965, in the clinic at [facility] FAMILY MED/ INTERNAL MED/ PEDS. Lanette Hoover MD is the patient's PCP. Vijaya Montenegro has an outstanding lab/procedure at the time we reviewed her chart. In order to help keep her health information updated, she has authorized us to request the following medical record(s):        (  )  MAMMOGRAM                                      (  )  COLONOSCOPY      ( x ) PAP SMEAR [id number]                       (  )  OUTSIDE LAB RESULTS     (  )  DEXA SCAN                                          (  )  EYE EXAM            (  )  FOOT EXAM                                          (  )  ENTIRE RECORD     (  )  OUTSIDE IMMUNIZATIONS                 (  )  _______________         Please fax records to [facility], Laura A Nicos[place], MD,  [phone].     If you have any questions, please contact Dalila Samuels LPN [facility] at   [phone].     Name: Vijaya Montenegro  : 1965  Patient Phone #: [phone]       ATTN: [facility] ( [facility] )

## 2023-04-17 NOTE — PROGRESS NOTES
CHIEF COMPLAINT:   Chief Complaint   Patient presents with    Follow-up        274}  HISTORY OF PRESENT ILLNESS:  Vijaya Montenegro is a 57 y.o. female who presents to the clinic today for Follow-up  .     The patient presents to clinic today for follow-up of her type 2 diabetes mellitus complicated by proteinuria, hypertension, and hyperlipidemia.  She reports that she checks her blood sugar whenever she thinks about it.  She does not always remember to take it 2 hours after eating.  She tries to follow a proper diet with 3 meals a day and 2 snacks.  She recently tested and had a very low blood sugar.  It stated she should call 911.  She states she did not feel bad with it.  She did eat a donut.  She states eventually the blood sugars came back up.   Her blood pressures have been very well controlled at home.      PAST MEDICAL HISTORY:  Past Medical History:   Diagnosis Date    Anxiety     Celiac disease 2021    GERD (gastroesophageal reflux disease)     Gout, arthritis     H. pylori infection     treated    Hyperlipidemia LDL goal < 100     Hypertension     Obesity     Type II or unspecified type diabetes mellitus without mention of complication, not stated as uncontrolled     diet controlled       PAST SURGICAL HISTORY:  Past Surgical History:   Procedure Laterality Date     SECTION, LOW TRANSVERSE      x2    REFRACTIVE SURGERY Bilateral     [facility]       SOCIAL HISTORY:  Social History     Socioeconomic History    Marital status:     Number of children: 2   Tobacco Use    Smoking status: Never    Smokeless tobacco: Never   Substance and Sexual Activity    Alcohol use: Yes    Drug use: No    Sexual activity: Yes     Partners: Male       FAMILY HISTORY:  Family History   Problem Relation Age of Onset    Hypertension Father     Bladder Cancer Father     Cancer Father         bladder    Diabetes Sister     Heart attack Maternal Grandmother     Cataracts Maternal  Grandmother     COPD Mother     Blindness Mother         left eye; eye injury    Thyroid disease Mother     No Known Problems Maternal Grandfather     Diabetes Paternal Grandmother     No Known Problems Paternal Grandfather     Diabetes Paternal Aunt     Diabetes Paternal Aunt     No Known Problems Maternal Aunt     No Known Problems Maternal Uncle     No Known Problems Paternal Uncle     Breast cancer Maternal Cousin     Colon cancer Neg Hx     Amblyopia Neg Hx     Glaucoma Neg Hx     Macular degeneration Neg Hx     Retinal detachment Neg Hx     Strabismus Neg Hx     Stroke Neg Hx        ALLERGIES AND MEDICATIONS: updated and reviewed.  Review of patient's allergies indicates:   Allergen Reactions    Codeine Other (See Comments)     Medication List with Changes/Refills   Current Medications    ALPRAZOLAM (XANAX) 0.25 MG TABLET    Take 1 tablet (0.25 mg total) by mouth daily as needed for Anxiety.    KETOROLAC (TORADOL) 10 MG TABLET    Take 1 tablet (10 mg total) by mouth every 6 (six) hours as needed (gout). Take with food.    LANCETS 31 GAUGE MISC    1 lancet by Misc.(Non-Drug; Combo Route) route 2 (two) times daily. Test BID    TRAZODONE (DESYREL) 50 MG TABLET    Take 1 tablet (50 mg total) by mouth every evening.   Changed and/or Refilled Medications    Modified Medication Previous Medication    ATORVASTATIN (LIPITOR) 20 MG TABLET atorvastatin (LIPITOR) 20 MG tablet       Take 1 tablet (20 mg total) by mouth once daily.    Take 1 tablet (20 mg total) by mouth once daily.    IRBESARTAN (AVAPRO) 75 MG TABLET irbesartan (AVAPRO) 75 MG tablet       TAKE ONE TABLET BY MOUTH EVERY EVENING.    TAKE ONE TABLET BY MOUTH EVERY EVENING.    METFORMIN (GLUCOPHAGE-XR) 500 MG ER 24HR TABLET metFORMIN (GLUCOPHAGE-XR) 500 MG ER 24hr tablet       Take 2 tablets (1,000 mg total) by mouth once daily.    TAKE TWO TABLETS BY MOUTH ONCE DAILY.    METOPROLOL TARTRATE (LOPRESSOR) 50 MG TABLET metoprolol tartrate (LOPRESSOR) 50 MG tablet  "      Take 1 tablet (50 mg total) by mouth 3 (three) times daily.    TAKE 1 TABLET BY MOUTH 3 TIMES A DAY   Discontinued Medications    HYDROCHLOROTHIAZIDE (HYDRODIURIL) 25 MG TABLET    TAKE ONE TABLET BY MOUTH ONCE DAILY.         CARE TEAM:  Patient Care Team:  Lanette Hoover MD as PCP - General (Internal Medicine)  Chidi Easley II, MD as PCP - OBGYN (Obstetrics)  Lanette Hoover MD as Hypertension Digital Medicine Responsible Provider (Internal Medicine)  Lanette Hoover MD as Diabetes Digital Medicine Responsible Provider (Internal Medicine)  Dalila Samuels LPN as Care Coordinator  Dick Mendez OD as Consulting Physician (Ophthalmology)  Rodolfo Tubbs PA-C as Hypertension Digital Medicine Clinician (Physician Assistant)  Rodolfo Tubbs PA-C as Diabetes Digital Medicine Clinician (Physician Assistant)  Anika Rocha as Digital Medicine Health          REVIEW OF SYSTEMS:  Review of Systems   Constitutional:  Negative for chills and fever.   HENT:  Negative for congestion.    Respiratory:  Negative for cough and shortness of breath.    Cardiovascular:  Negative for chest pain and leg swelling.   Gastrointestinal:  Negative for abdominal pain.       PHYSICAL EXAM: 274}  Vitals:    04/17/23 0813   BP: 120/74   BP Location: Left arm   Patient Position: Sitting   BP Method: Medium (Manual)   Pulse: 61   Temp: 97.9 °F (36.6 °C)   TempSrc: Oral   SpO2: 96%   Weight: 66.4 kg (146 lb 7.9 oz)   Height: 5' 1" (1.549 m)       Body mass index is 27.68 kg/m².      General appearance - alert, well appearing, and in no distress, overweight  Psychiatric - alert, oriented to person, place, and time, normal behavior, speech, dress, motor activity and thought process  Mouth - not examined  Chest - clear to auscultation, no wheezes, rales or rhonchi, symmetric air entry  Heart - normal rate and regular rhythm, no gallops noted      Labs:  Lab Results   Component Value Date    HGBA1C 6.8 (H) " 04/10/2023    HGBA1C 6.7 (H) 10/13/2022    HGBA1C 6.9 (H) 04/09/2022      Lab Results   Component Value Date    CHOL 148 04/10/2023    CHOL 143 04/09/2022    CHOL 168 08/03/2021     Lab Results   Component Value Date    LDLCALC 84.8 04/10/2023    LDLCALC 79.4 04/09/2022    LDLCALC 96.2 08/03/2021         ASSESSMENT AND PLAN:  274}  1. Type 2 diabetes mellitus with proteinuria  Lab Results   Component Value Date    HGBA1C 6.8 (H) 04/10/2023     Diabetes is under good control at this time for age and comorbid conditions.   We discussed diabetic diet and regular exercise.   We discussed home blood sugar monitoring, if appropriate - the patient should test once daily and as needed.   Continue current medication regimen. Recheck A1c in 6 months.  Diabetic complications addressed: None addressed today.  Patient was counseled on the need for yearly eye exam to screen for/monitor diabetic retinopathy and yearly diabetic foot exam.  -     metFORMIN (GLUCOPHAGE-XR) 500 MG ER 24hr tablet; Take 2 tablets (1,000 mg total) by mouth once daily.  Dispense: 180 tablet; Refill: 1  -     Hemoglobin A1C; Future; Expected date: 10/17/2023    2. Essential hypertension  The patient has well-controlled blood pressure.  She is active on the digital medicine program.  On recent lab work her calcium was slightly elevated.  This is likely due to her hydrochlorothiazide.  I will stop her HCTZ and continue to monitor her blood pressures.  Consider increasing Avapro dosing if blood pressures increase.  -     metoprolol tartrate (LOPRESSOR) 50 MG tablet; Take 1 tablet (50 mg total) by mouth 3 (three) times daily.  Dispense: 270 tablet; Refill: 1  -     irbesartan (AVAPRO) 75 MG tablet; TAKE ONE TABLET BY MOUTH EVERY EVENING.  Dispense: 90 tablet; Refill: 1    3. Hyperlipidemia with target LDL less than 100  Lab Results   Component Value Date    CHOL 148 04/10/2023     Lab Results   Component Value Date    HDL 50 04/10/2023     Lab Results    Component Value Date    LDLCALC 84.8 04/10/2023     Lab Results   Component Value Date    TRIG 66 04/10/2023     Lab Results   Component Value Date    LDLCALC 84.8 04/10/2023     We discussed low fat diet and regular exercise.The current medical regimen is effective;  continue present plan and medications.   -     atorvastatin (LIPITOR) 20 MG tablet; Take 1 tablet (20 mg total) by mouth once daily.  Dispense: 90 tablet; Refill: 1    4. Gastroesophageal reflux disease without esophagitis  Symptoms controlled: yes - takes medication occasionally as needed.   Reflux precautions discussed (eliminate tobacco if a smoker; minimize caffeine, chocolate and red/white peppermint intake; avoid heavy and spicy meals; don't lay down within 2-3 hours after eating; minimize the intake of NSAIDs). Medication as needed.   Patient asked to take medication breaks, if possible - discussed chronic use can limit calcium absorption (which can lead to osteopenia/osteoporosis), increases the risk for intestinal infections, and can cause kidney damage. There are also some newer studies that show possible increased risk of mortality.    5. Celiac disease  Continue to avoid gluten in diet. Stable.    6. Osteopenia after menopause  We discussed adequate calcium and vitamin D supplementation. We discussed fall precautions. She is up to date on her BMD. No need for prescription medication at this time.  Overview:  - 10/2022 - No need for Rx tx at this time.        7. Gout, arthritis  The current medical regimen is effective;  continue present plan and medications.     8. Overweight (BMI 25.0-29.9)  BMI Readings from Last 3 Encounters:   04/17/23 27.68 kg/m²   10/17/22 27.66 kg/m²   04/13/22 27.89 kg/m²     The patient is asked to continue to make an attempt to improve diet and exercise patterns to aid in medical management of this problem.             Orders Placed This Encounter   Procedures    Hemoglobin A1C      Follow up in about 6 months  (around 10/17/2023) for annual exam. or sooner as needed.

## 2023-04-17 NOTE — PROGRESS NOTES
Overdue Diabetic Eye Exam - A request was sent today for patient's record.    Pap Smear - A request was sent today for patient's record.

## 2023-04-17 NOTE — LETTER
AUTHORIZATION FOR RELEASE OF   CONFIDENTIAL INFORMATION    Dear Dr. Dick Oliva,    We are seeing Vijaya Montenegro, date of birth 1965, in the clinic at Located within [facility] FAMILY MED/ INTERNAL MED/ PEDS. Lanette Hoover MD is the patient's PCP. Vijaya Montenegro has an outstanding lab/procedure at the time we reviewed her chart. In order to help keep her health information updated, she has authorized us to request the following medical record(s):        (  )  MAMMOGRAM                                      (  )  COLONOSCOPY      (  )  PAP SMEAR                                          (  )  OUTSIDE LAB RESULTS     (  )  DEXA SCAN                                          ( x ) EYE EXAM(diabetic) [id number]            (  )  FOOT EXAM                                          (  )  ENTIRE RECORD     (  )  OUTSIDE IMMUNIZATIONS                 (  )  _______________         Please fax records to [facility], Laura A [place], MD,  [phone].     If you have any questions, please contact Dalila Samuels LPN [facility] at   [phone].     Patient Name: Vijaya Montenegro  : 1965  Patient Phone #: [phone]

## 2023-04-21 ENCOUNTER — PATIENT OUTREACH (OUTPATIENT)
Dept: ADMINISTRATIVE | Facility: HOSPITAL | Age: 58
End: 2023-04-21
Payer: COMMERCIAL

## 2023-04-26 ENCOUNTER — PATIENT OUTREACH (OUTPATIENT)
Dept: ADMINISTRATIVE | Facility: HOSPITAL | Age: 58
End: 2023-04-26
Payer: COMMERCIAL

## 2023-05-05 LAB
HPV, HIGH-RISK: NOT DETECTED
PAP RECOMMENDATION EXT: NORMAL
PAP SMEAR: NORMAL

## 2023-05-19 ENCOUNTER — PATIENT MESSAGE (OUTPATIENT)
Dept: FAMILY MEDICINE | Facility: CLINIC | Age: 58
End: 2023-05-19
Payer: COMMERCIAL

## 2023-10-03 ENCOUNTER — PATIENT MESSAGE (OUTPATIENT)
Dept: FAMILY MEDICINE | Facility: CLINIC | Age: 58
End: 2023-10-03
Payer: COMMERCIAL

## 2023-10-10 ENCOUNTER — PATIENT OUTREACH (OUTPATIENT)
Dept: ADMINISTRATIVE | Facility: HOSPITAL | Age: 58
End: 2023-10-10
Payer: COMMERCIAL

## 2023-10-10 NOTE — PROGRESS NOTES
[facility] 1 Comm Gap Report 10.09.23 Diabetic Eye Exam - Left message for patient to call our office. Please schedule.

## 2023-10-10 NOTE — LETTER
AUTHORIZATION FOR RELEASE OF   CONFIDENTIAL INFORMATION    Dear Dr. Dick Mendez,    We are seeing Vijaya Montenegro, date of birth 1965, in the clinic at [facility] FAMILY MED/ INTERNAL MED/ PEDS. Lanette Hoover MD is the patient's PCP. Vijaya Montenegro has an outstanding lab/procedure at the time we reviewed her chart. In order to help keep her health information updated, she has authorized us to request the following medical record(s):        (  )  MAMMOGRAM                                      (  )  COLONOSCOPY      (  )  PAP SMEAR                                          (  )  OUTSIDE LAB RESULTS     (  )  DEXA SCAN                                          ( x ) EYE EXAM(diabetic)             (  )  FOOT EXAM                                          (  )  ENTIRE RECORD     (  )  OUTSIDE IMMUNIZATIONS                 (  )  _______________         Please fax records to [facility], [place], Laura A., MD,  [phone].     If you have any questions, please contact Dalila Samuels LPN [facility] at   [phone].     Patient Name: Vijaya Montenegro  : 1965  Patient Phone #: [phone]

## 2023-10-12 ENCOUNTER — PATIENT OUTREACH (OUTPATIENT)
Dept: ADMINISTRATIVE | Facility: HOSPITAL | Age: 58
End: 2023-10-12
Payer: COMMERCIAL

## 2023-10-13 ENCOUNTER — LAB VISIT (OUTPATIENT)
Dept: LAB | Facility: HOSPITAL | Age: 58
End: 2023-10-13
Attending: INTERNAL MEDICINE
Payer: COMMERCIAL

## 2023-10-13 DIAGNOSIS — R80.9 TYPE 2 DIABETES MELLITUS WITH PROTEINURIA: ICD-10-CM

## 2023-10-13 DIAGNOSIS — E11.29 TYPE 2 DIABETES MELLITUS WITH PROTEINURIA: ICD-10-CM

## 2023-10-13 LAB
ESTIMATED AVG GLUCOSE: 140 MG/DL (ref 68–131)
HBA1C MFR BLD: 6.5 % (ref 4–5.6)

## 2023-10-13 PROCEDURE — 83036 HEMOGLOBIN GLYCOSYLATED A1C: CPT | Performed by: INTERNAL MEDICINE

## 2023-10-13 PROCEDURE — 36415 COLL VENOUS BLD VENIPUNCTURE: CPT | Mod: PN | Performed by: INTERNAL MEDICINE

## 2023-10-18 ENCOUNTER — OFFICE VISIT (OUTPATIENT)
Dept: FAMILY MEDICINE | Facility: CLINIC | Age: 58
End: 2023-10-18
Payer: COMMERCIAL

## 2023-10-18 VITALS — BODY MASS INDEX: 27.68 KG/M2 | HEIGHT: 61 IN

## 2023-10-18 DIAGNOSIS — K21.9 GASTROESOPHAGEAL REFLUX DISEASE WITHOUT ESOPHAGITIS: ICD-10-CM

## 2023-10-18 DIAGNOSIS — I10 ESSENTIAL HYPERTENSION: ICD-10-CM

## 2023-10-18 DIAGNOSIS — E78.5 HYPERLIPIDEMIA WITH TARGET LDL LESS THAN 100: ICD-10-CM

## 2023-10-18 DIAGNOSIS — M10.9 GOUT, ARTHRITIS: ICD-10-CM

## 2023-10-18 DIAGNOSIS — R80.9 TYPE 2 DIABETES MELLITUS WITH PROTEINURIA: Primary | ICD-10-CM

## 2023-10-18 DIAGNOSIS — F41.9 ANXIETY: ICD-10-CM

## 2023-10-18 DIAGNOSIS — Z23 FLU VACCINE NEED: ICD-10-CM

## 2023-10-18 DIAGNOSIS — M85.80 OSTEOPENIA AFTER MENOPAUSE: ICD-10-CM

## 2023-10-18 DIAGNOSIS — E11.29 TYPE 2 DIABETES MELLITUS WITH PROTEINURIA: Primary | ICD-10-CM

## 2023-10-18 DIAGNOSIS — Z78.0 OSTEOPENIA AFTER MENOPAUSE: ICD-10-CM

## 2023-10-18 DIAGNOSIS — K90.0 CELIAC DISEASE: ICD-10-CM

## 2023-10-18 DIAGNOSIS — E66.3 OVERWEIGHT (BMI 25.0-29.9): ICD-10-CM

## 2023-10-18 PROCEDURE — 4010F PR ACE/ARB THEARPY RXD/TAKEN: ICD-10-PCS | Mod: CPTII,95,, | Performed by: INTERNAL MEDICINE

## 2023-10-18 PROCEDURE — 3061F PR NEG MICROALBUMINURIA RESULT DOCUMENTED/REVIEW: ICD-10-PCS | Mod: CPTII,95,, | Performed by: INTERNAL MEDICINE

## 2023-10-18 PROCEDURE — 3044F HG A1C LEVEL LT 7.0%: CPT | Mod: CPTII,95,, | Performed by: INTERNAL MEDICINE

## 2023-10-18 PROCEDURE — 99214 OFFICE O/P EST MOD 30 MIN: CPT | Mod: 95,,, | Performed by: INTERNAL MEDICINE

## 2023-10-18 PROCEDURE — 1159F MED LIST DOCD IN RCRD: CPT | Mod: CPTII,95,, | Performed by: INTERNAL MEDICINE

## 2023-10-18 PROCEDURE — 1160F PR REVIEW ALL MEDS BY PRESCRIBER/CLIN PHARMACIST DOCUMENTED: ICD-10-PCS | Mod: CPTII,95,, | Performed by: INTERNAL MEDICINE

## 2023-10-18 PROCEDURE — 3008F PR BODY MASS INDEX (BMI) DOCUMENTED: ICD-10-PCS | Mod: CPTII,95,, | Performed by: INTERNAL MEDICINE

## 2023-10-18 PROCEDURE — 1160F RVW MEDS BY RX/DR IN RCRD: CPT | Mod: CPTII,95,, | Performed by: INTERNAL MEDICINE

## 2023-10-18 PROCEDURE — 3061F NEG MICROALBUMINURIA REV: CPT | Mod: CPTII,95,, | Performed by: INTERNAL MEDICINE

## 2023-10-18 PROCEDURE — 3008F BODY MASS INDEX DOCD: CPT | Mod: CPTII,95,, | Performed by: INTERNAL MEDICINE

## 2023-10-18 PROCEDURE — 1159F PR MEDICATION LIST DOCUMENTED IN MEDICAL RECORD: ICD-10-PCS | Mod: CPTII,95,, | Performed by: INTERNAL MEDICINE

## 2023-10-18 PROCEDURE — 4010F ACE/ARB THERAPY RXD/TAKEN: CPT | Mod: CPTII,95,, | Performed by: INTERNAL MEDICINE

## 2023-10-18 PROCEDURE — 99214 PR OFFICE/OUTPT VISIT, EST, LEVL IV, 30-39 MIN: ICD-10-PCS | Mod: 95,,, | Performed by: INTERNAL MEDICINE

## 2023-10-18 PROCEDURE — 3066F PR DOCUMENTATION OF TREATMENT FOR NEPHROPATHY: ICD-10-PCS | Mod: CPTII,95,, | Performed by: INTERNAL MEDICINE

## 2023-10-18 PROCEDURE — 3044F PR MOST RECENT HEMOGLOBIN A1C LEVEL <7.0%: ICD-10-PCS | Mod: CPTII,95,, | Performed by: INTERNAL MEDICINE

## 2023-10-18 PROCEDURE — 3066F NEPHROPATHY DOC TX: CPT | Mod: CPTII,95,, | Performed by: INTERNAL MEDICINE

## 2023-10-18 RX ORDER — IRBESARTAN 75 MG/1
TABLET ORAL
Qty: 90 TABLET | Refills: 1 | Status: SHIPPED | OUTPATIENT
Start: 2023-10-18

## 2023-10-18 RX ORDER — ATORVASTATIN CALCIUM 20 MG/1
20 TABLET, FILM COATED ORAL DAILY
Qty: 90 TABLET | Refills: 1 | Status: SHIPPED | OUTPATIENT
Start: 2023-10-18

## 2023-10-18 RX ORDER — METOPROLOL TARTRATE 50 MG/1
50 TABLET ORAL 3 TIMES DAILY
Qty: 270 TABLET | Refills: 1 | Status: SHIPPED | OUTPATIENT
Start: 2023-10-18

## 2023-10-18 RX ORDER — METFORMIN HYDROCHLORIDE 500 MG/1
1000 TABLET, EXTENDED RELEASE ORAL DAILY
Qty: 180 TABLET | Refills: 1 | Status: SHIPPED | OUTPATIENT
Start: 2023-10-18

## 2023-10-18 NOTE — PROGRESS NOTES
CHIEF COMPLAINT:   Chief Complaint   Patient presents with    Annual Exam         The patient location is:  Patient Home.  The chief complaint leading to consultation is: Annual Exam   .  Visit type: Virtual visit with synchronous audio and video.  Total time spent with patient: 8 minutes.  Each patient to whom he or she provides medical services by telemedicine is:  (1) informed of the relationship between the physician and patient and the respective role of any other health care provider with respect to management of the patient; and (2) notified that he or she may decline to receive medical services by telemedicine and may withdraw from such care at any time.      HISTORY OF PRESENT ILLNESS:  Vijaya Montenegro is a 57 y.o. female who was seen virtually today for Annual Exam  .  The patient was seen today for a virtual visit for follow-up of her type 2 diabetes mellitus complicated by proteinuria, hypertension, and hyperlipidemia.  She recently did blood work.  She reports that she may be eating a little less as her  is not always home to eat with her therefore she is cooking a little less.  She denies any problems with low blood sugars. The patient denies any problems with cardiac chest pain, dizziness, palpitations, orthopnea, or lower extremity edema. The patient reports compliance with current medication- patient denies missing any  medication doses.        Subjective    PAST MEDICAL HISTORY:  Past Medical History:   Diagnosis Date    Anxiety     Celiac disease 2021    GERD (gastroesophageal reflux disease)     Gout, arthritis     H. pylori infection     treated    Hyperlipidemia LDL goal < 100     Hypertension     Obesity     Type II or unspecified type diabetes mellitus without mention of complication, not stated as uncontrolled     diet controlled       PAST SURGICAL HISTORY:  Past Surgical History:   Procedure Laterality Date     SECTION, LOW TRANSVERSE      x2    REFRACTIVE SURGERY  Bilateral     [facility]       SOCIAL HISTORY:  Social History     Socioeconomic History    Marital status:     Number of children: 2   Tobacco Use    Smoking status: Never    Smokeless tobacco: Never   Substance and Sexual Activity    Alcohol use: Yes    Drug use: No    Sexual activity: Yes     Partners: Male     Social Determinants of Health     Financial Resource Strain: Low Risk  (10/11/2023)    Overall Financial Resource Strain (CARDIA)     Difficulty of Paying Living Expenses: Not hard at all   Food Insecurity: No Food Insecurity (10/11/2023)    Hunger Vital Sign     Worried About Running Out of Food in the Last Year: Never true     Ran Out of Food in the Last Year: Never true   Transportation Needs: No Transportation Needs (10/11/2023)    PRAPARE - Transportation     Lack of Transportation (Medical): No     Lack of Transportation (Non-Medical): No   Physical Activity: Insufficiently Active (10/11/2023)    Exercise Vital Sign     Days of Exercise per Week: 4 days     Minutes of Exercise per Session: 30 min   Stress: No Stress Concern Present (10/11/2023)    [place] [employer] - Occupational Stress Questionnaire     Feeling of Stress : Only a little   Social Connections: Unknown (10/11/2023)    Social Connection and Isolation Panel [NHANES]     Frequency of Communication with Friends and Family: More than three times a week     Frequency of Social Gatherings with Friends and Family: More than three times a week     Active Member of Clubs or Organizations: No     Attends Club or Organization Meetings: Never     Marital Status:    Housing Stability: Low Risk  (10/11/2023)    Housing Stability Vital Sign     Unable to Pay for Housing in the Last Year: No     Number of Places Lived in the Last Year: 2     Unstable Housing in the Last Year: No       FAMILY HISTORY:  Family History   Problem Relation Age of Onset    Hypertension Father     Bladder Cancer Father      Cancer Father         bladder    Diabetes Sister     Heart attack Maternal Grandmother     Cataracts Maternal Grandmother     COPD Mother     Blindness Mother         left eye; eye injury    Thyroid disease Mother     No Known Problems Maternal Grandfather     Diabetes Paternal Grandmother     No Known Problems Paternal Grandfather     Diabetes Paternal Aunt     Diabetes Paternal Aunt     No Known Problems Maternal Aunt     No Known Problems Maternal Uncle     No Known Problems Paternal Uncle     Breast cancer Maternal Cousin     Colon cancer Neg Hx     Amblyopia Neg Hx     Glaucoma Neg Hx     Macular degeneration Neg Hx     Retinal detachment Neg Hx     Strabismus Neg Hx     Stroke Neg Hx        ALLERGIES AND MEDICATIONS: updated and reviewed.   Review of patient's allergies indicates:   Allergen Reactions    Codeine Other (See Comments)     Medication List with Changes/Refills   Current Medications    ALPRAZOLAM (XANAX) 0.25 MG TABLET    Take 1 tablet (0.25 mg total) by mouth daily as needed for Anxiety.    KETOROLAC (TORADOL) 10 MG TABLET    Take 1 tablet (10 mg total) by mouth every 6 (six) hours as needed (gout). Take with food.    LANCETS 31 GAUGE MISC    1 lancet by Misc.(Non-Drug; Combo Route) route 2 (two) times daily. Test BID    TRAZODONE (DESYREL) 50 MG TABLET    Take 1 tablet (50 mg total) by mouth every evening.   Changed and/or Refilled Medications    Modified Medication Previous Medication    ATORVASTATIN (LIPITOR) 20 MG TABLET atorvastatin (LIPITOR) 20 MG tablet       Take 1 tablet (20 mg total) by mouth once daily.    Take 1 tablet (20 mg total) by mouth once daily.    IRBESARTAN (AVAPRO) 75 MG TABLET irbesartan (AVAPRO) 75 MG tablet       TAKE ONE TABLET BY MOUTH EVERY EVENING.    TAKE ONE TABLET BY MOUTH EVERY EVENING.    METFORMIN (GLUCOPHAGE-XR) 500 MG ER 24HR TABLET metFORMIN (GLUCOPHAGE-XR) 500 MG ER 24hr tablet       Take 2 tablets (1,000 mg total) by mouth once daily.    Take 2 tablets  (1,000 mg total) by mouth once daily.    METOPROLOL TARTRATE (LOPRESSOR) 50 MG TABLET metoprolol tartrate (LOPRESSOR) 50 MG tablet       Take 1 tablet (50 mg total) by mouth 3 (three) times daily.    Take 1 tablet (50 mg total) by mouth 3 (three) times daily.          CARE TEAM:  Patient Care Team:  Lanette Hoover MD as PCP - General (Internal Medicine)  Chidi Easley II, MD as PCP - OBGYN (Obstetrics)  Lanette Hoover MD as Hypertension Digital Medicine Responsible Provider (Internal Medicine)  Lanette Hoover MD as Diabetes Digital Medicine Responsible Provider (Internal Medicine)  Dalila Samuels LPN as Care Coordinator  Dick Mendez OD as Consulting Physician (Ophthalmology)  Rodolfo Tubbs PA-C as Hypertension Digital Medicine Clinician (Physician Assistant)  Rodolfo Tubbs PA-C as Diabetes Digital Medicine Clinician (Physician Assistant)  Monserrat Matias as Digital Medicine Health          REVIEW OF SYSTEMS:  Review of Systems   Constitutional:  Negative for activity change and unexpected weight change.   HENT:  Negative for hearing loss, rhinorrhea and trouble swallowing.    Eyes:  Negative for discharge and visual disturbance.   Respiratory:  Negative for chest tightness and wheezing.    Cardiovascular:  Negative for chest pain and palpitations.   Gastrointestinal:  Negative for blood in stool, constipation, diarrhea and vomiting.   Endocrine: Negative for polydipsia and polyuria.   Genitourinary:  Negative for difficulty urinating, dysuria, hematuria and menstrual problem.   Musculoskeletal:  Negative for arthralgias, joint swelling and neck pain.   Neurological:  Negative for weakness and headaches.   Psychiatric/Behavioral:  Negative for confusion and dysphoric mood.            Objective    PHYSICAL EXAM: (exam limited as this was a virtual visit)  alert, well appearing, and in no distress  alert, oriented to person, place and time, normal speech        LABS:  Lab Results   Component Value Date    HGBA1C 6.5 (H) 10/13/2023    HGBA1C 6.8 (H) 04/10/2023    HGBA1C 6.7 (H) 10/13/2022      Lab Results   Component Value Date    CHOL 148 04/10/2023    CHOL 143 04/09/2022    CHOL 168 08/03/2021     Lab Results   Component Value Date    LDLCALC 84.8 04/10/2023    LDLCALC 79.4 04/09/2022    LDLCALC 96.2 08/03/2021             ASSESSMENT AND PLAN:  1. Type 2 diabetes mellitus with proteinuria  Lab Results   Component Value Date    HGBA1C 6.5 (H) 10/13/2023     Diabetes is under good control at this time for age and comorbid conditions.   We discussed diabetic diet and regular exercise.   We discussed home blood sugar monitoring, if appropriate - the patient should test once daily and as needed.   Continue current medication regimen.  Recheck A1c in 6 months.  Diabetic complications addressed: None addressed today.  Patient was counseled on the need for yearly eye exam to screen for/monitor diabetic retinopathy and yearly diabetic foot exam.  -     metFORMIN (GLUCOPHAGE-XR) 500 MG ER 24hr tablet; Take 2 tablets (1,000 mg total) by mouth once daily.  Dispense: 180 tablet; Refill: 1  -     Hemoglobin A1C; Future; Expected date: 04/18/2024  -     Microalbumin/Creatinine Ratio, Urine; Future; Expected date: 04/18/2024    2. Essential hypertension  BP Readings from Last 1 Encounters:   04/17/23 120/74      The current medical regimen is effective;  continue present plan and medications. Recommended patient to check home readings to monitor and see me for followup as scheduled or sooner as needed.   Discussed sodium restriction, maintaining ideal body weight and regular exercise program as physiologic means to continue to achieve blood pressure control in addition to medication compliance.  Patient was educated that both decongestant and anti-inflammatory medication may raise blood pressure.  The patient is active on the digital hypertension program.  -     metoprolol tartrate  (LOPRESSOR) 50 MG tablet; Take 1 tablet (50 mg total) by mouth 3 (three) times daily.  Dispense: 270 tablet; Refill: 1  -     irbesartan (AVAPRO) 75 MG tablet; TAKE ONE TABLET BY MOUTH EVERY EVENING.  Dispense: 90 tablet; Refill: 1  -     CBC Auto Differential; Future; Expected date: 04/18/2024    3. Hyperlipidemia with target LDL less than 100  Lab Results   Component Value Date    CHOL 148 04/10/2023     Lab Results   Component Value Date    HDL 50 04/10/2023     Lab Results   Component Value Date    LDLCALC 84.8 04/10/2023     Lab Results   Component Value Date    TRIG 66 04/10/2023     Lab Results   Component Value Date    LDLCALC 84.8 04/10/2023     We discussed low fat diet and regular exercise.The current medical regimen is effective;  continue present plan and medications.   -     atorvastatin (LIPITOR) 20 MG tablet; Take 1 tablet (20 mg total) by mouth once daily.  Dispense: 90 tablet; Refill: 1  -     Lipid Panel; Future; Expected date: 04/18/2024  -     Comprehensive Metabolic Panel; Future; Expected date: 04/18/2024    4. Celiac disease  The current medical regimen is effective;  continue present plan and medications.     5. Gastroesophageal reflux disease without esophagitis  Symptoms controlled: yes - takes medication occasionally as needed.   Reflux precautions discussed (eliminate tobacco if a smoker; minimize caffeine, chocolate and red/white peppermint intake; avoid heavy and spicy meals; don't lay down within 2-3 hours after eating; minimize the intake of NSAIDs). Medication as needed.   Patient asked to take medication breaks, if possible - discussed chronic use can limit calcium absorption (which can lead to osteopenia/osteoporosis), increases the risk for intestinal infections, and can cause kidney damage. There are also some newer studies that show possible increased risk of mortality.    6. Osteopenia after menopause  We discussed adequate calcium intake (preferably from diet) and vitamin D  supplementation. We discussed fall precautions. She is up to date on her BMD. No need for prescription medication at this time.  Overview:  - 10/2022 - No need for Rx tx at this time.        7. Gout, arthritis  The current medical regimen is effective;  continue present plan and medications.     8. Overweight (BMI 25.0-29.9)  BMI Readings from Last 3 Encounters:   10/18/23 27.68 kg/m²   04/17/23 27.68 kg/m²   10/17/22 27.66 kg/m²     The patient is asked to make an attempt to improve diet and exercise patterns to aid in medical management of this problem.    9. Anxiety  Stable. Medication as needed.    10. Flu vaccine need  Will complete at her earliest convenience.            Orders Placed This Encounter   Procedures    Hemoglobin A1C    Lipid Panel    Comprehensive Metabolic Panel    CBC Auto Differential    Microalbumin/Creatinine Ratio, Urine      FOLLOW UP: Follow up in about 6 months (around 4/18/2024), or if symptoms worsen or fail to improve, for annual exam. or sooner as needed.

## 2023-11-09 ENCOUNTER — PATIENT MESSAGE (OUTPATIENT)
Dept: FAMILY MEDICINE | Facility: CLINIC | Age: 58
End: 2023-11-09
Payer: COMMERCIAL

## 2023-12-06 ENCOUNTER — HOSPITAL ENCOUNTER (OUTPATIENT)
Dept: RADIOLOGY | Facility: HOSPITAL | Age: 58
Discharge: HOME | End: 2023-12-06
Attending: INTERNAL MEDICINE
Payer: COMMERCIAL

## 2023-12-06 DIAGNOSIS — Z12.31 ENCOUNTER FOR SCREENING MAMMOGRAM FOR BREAST CANCER: ICD-10-CM

## 2023-12-06 PROCEDURE — 77067 MAMMO DIGITAL SCREENING BILAT WITH TOMO: ICD-10-PCS | Mod: 26,,, | Performed by: RADIOLOGY

## 2023-12-06 PROCEDURE — 77067 SCR MAMMO BI INCL CAD: CPT | Mod: 26,,, | Performed by: RADIOLOGY

## 2023-12-06 PROCEDURE — 77063 MAMMO DIGITAL SCREENING BILAT WITH TOMO: ICD-10-PCS | Mod: 26,,, | Performed by: RADIOLOGY

## 2023-12-06 PROCEDURE — 77063 BREAST TOMOSYNTHESIS BI: CPT | Mod: 26,,, | Performed by: RADIOLOGY

## 2023-12-06 PROCEDURE — 77067 SCR MAMMO BI INCL CAD: CPT | Mod: TC,PO

## 2023-12-13 LAB
LEFT EYE DM RETINOPATHY: NEGATIVE
RIGHT EYE DM RETINOPATHY: NEGATIVE

## 2024-01-06 ENCOUNTER — PATIENT MESSAGE (OUTPATIENT)
Dept: FAMILY MEDICINE | Facility: CLINIC | Age: 59
End: 2024-01-06
Payer: COMMERCIAL

## 2024-04-05 DIAGNOSIS — E11.29 TYPE 2 DIABETES MELLITUS WITH PROTEINURIA: ICD-10-CM

## 2024-04-05 DIAGNOSIS — R80.9 TYPE 2 DIABETES MELLITUS WITH PROTEINURIA: ICD-10-CM

## 2024-04-05 NOTE — TELEPHONE ENCOUNTER
Care Due:                  Date            Visit Type   Department     Provider  --------------------------------------------------------------------------------                                ESTABLISHED   [facility]                              PATIENT -    MED/ INTERNAL  [facility] [place]  Last Visit: 10-      VIRTUAL      MED/ PEDS      DarienIdomoodarrel Hoover                              EP -         [facility]                              PRIMARY      MED/ INTERNAL  [facility] [place]  Next Visit: 04-      CARE ([facility])   MED/ PEDS      Darienkeler  Sneha                                                            Last  Test          Frequency    Reason                     Performed    Due Date  --------------------------------------------------------------------------------    CMP.........  12 months..  atorvastatin, irbesartan,   04-   04-                             metFORMIN................    HBA1C.......  6 months...  metFORMIN................  10-   04-    Lipid Panel.  12 months..  atorvastatin.............  04-   04-    [facility] Embedded Care Due Messages. Reference number: [id number].   4/05/2024 10:37:03 AM CDT

## 2024-04-08 RX ORDER — METFORMIN HYDROCHLORIDE 500 MG/1
1000 TABLET, EXTENDED RELEASE ORAL DAILY
Qty: 180 TABLET | Refills: 0 | Status: SHIPPED | OUTPATIENT
Start: 2024-04-08 | End: 2024-04-16 | Stop reason: DRUGHIGH

## 2024-04-08 NOTE — TELEPHONE ENCOUNTER
Refill Routing Note   Medication(s) are not appropriate for processing by [facility] for the following reason(s):        Required labs outdated    [facility] action(s):  Defer      Medication Therapy Plan: FLOS      Appointments  past 12m or future 3m with PCP    Date Provider   Last Visit   10/18/2023 Lanette Hoover MD   Next Visit   4/22/2024 Lanette Hoover MD   ED visits in past 90 days: 0        Note composed:9:51 AM 04/08/2024

## 2024-04-15 ENCOUNTER — LAB VISIT (OUTPATIENT)
Dept: LAB | Facility: HOSPITAL | Age: 59
End: 2024-04-15
Attending: INTERNAL MEDICINE
Payer: COMMERCIAL

## 2024-04-15 DIAGNOSIS — I10 ESSENTIAL HYPERTENSION: ICD-10-CM

## 2024-04-15 DIAGNOSIS — R80.9 TYPE 2 DIABETES MELLITUS WITH PROTEINURIA: ICD-10-CM

## 2024-04-15 DIAGNOSIS — E78.5 HYPERLIPIDEMIA WITH TARGET LDL LESS THAN 100: ICD-10-CM

## 2024-04-15 DIAGNOSIS — E11.29 TYPE 2 DIABETES MELLITUS WITH PROTEINURIA: ICD-10-CM

## 2024-04-15 LAB
ALBUMIN SERPL BCP-MCNC: 3.9 G/DL (ref 3.5–5.2)
ALP SERPL-CCNC: 94 U/L (ref 55–135)
ALT SERPL W/O P-5'-P-CCNC: 18 U/L (ref 10–44)
ANION GAP SERPL CALC-SCNC: 10 MMOL/L (ref 8–16)
AST SERPL-CCNC: 16 U/L (ref 10–40)
BASOPHILS # BLD AUTO: 0.04 K/UL (ref 0–0.2)
BASOPHILS NFR BLD: 0.5 % (ref 0–1.9)
BILIRUB SERPL-MCNC: 0.8 MG/DL (ref 0.1–1)
BUN SERPL-MCNC: 17 MG/DL (ref 6–20)
CALCIUM SERPL-MCNC: 10.2 MG/DL (ref 8.7–10.5)
CHLORIDE SERPL-SCNC: 106 MMOL/L (ref 95–110)
CHOLEST SERPL-MCNC: 152 MG/DL (ref 120–199)
CHOLEST/HDLC SERPL: 3.2 {RATIO} (ref 2–5)
CO2 SERPL-SCNC: 25 MMOL/L (ref 23–29)
CREAT SERPL-MCNC: 0.9 MG/DL (ref 0.5–1.4)
DIFFERENTIAL METHOD BLD: NORMAL
EOSINOPHIL # BLD AUTO: 0.2 K/UL (ref 0–0.5)
EOSINOPHIL NFR BLD: 2.2 % (ref 0–8)
ERYTHROCYTE [DISTWIDTH] IN BLOOD BY AUTOMATED COUNT: 12.5 % (ref 11.5–14.5)
EST. GFR  (NO RACE VARIABLE): >60 ML/MIN/1.73 M^2
ESTIMATED AVG GLUCOSE: 154 MG/DL (ref 68–131)
GLUCOSE SERPL-MCNC: 150 MG/DL (ref 70–110)
HBA1C MFR BLD: 7 % (ref 4–5.6)
HCT VFR BLD AUTO: 40 % (ref 37–48.5)
HDLC SERPL-MCNC: 48 MG/DL (ref 40–75)
HDLC SERPL: 31.6 % (ref 20–50)
HGB BLD-MCNC: 13.5 G/DL (ref 12–16)
IMM GRANULOCYTES # BLD AUTO: 0.01 K/UL (ref 0–0.04)
IMM GRANULOCYTES NFR BLD AUTO: 0.1 % (ref 0–0.5)
LDLC SERPL CALC-MCNC: 78.8 MG/DL (ref 63–159)
LYMPHOCYTES # BLD AUTO: 3.2 K/UL (ref 1–4.8)
LYMPHOCYTES NFR BLD: 41.6 % (ref 18–48)
MCH RBC QN AUTO: 30.1 PG (ref 27–31)
MCHC RBC AUTO-ENTMCNC: 33.8 G/DL (ref 32–36)
MCV RBC AUTO: 89 FL (ref 82–98)
MONOCYTES # BLD AUTO: 0.6 K/UL (ref 0.3–1)
MONOCYTES NFR BLD: 7.3 % (ref 4–15)
NEUTROPHILS # BLD AUTO: 3.7 K/UL (ref 1.8–7.7)
NEUTROPHILS NFR BLD: 48.3 % (ref 38–73)
NONHDLC SERPL-MCNC: 104 MG/DL
NRBC BLD-RTO: 0 /100 WBC
PLATELET # BLD AUTO: 245 K/UL (ref 150–450)
PMV BLD AUTO: 9.2 FL (ref 9.2–12.9)
POTASSIUM SERPL-SCNC: 4.6 MMOL/L (ref 3.5–5.1)
PROT SERPL-MCNC: 7.4 G/DL (ref 6–8.4)
RBC # BLD AUTO: 4.49 M/UL (ref 4–5.4)
SODIUM SERPL-SCNC: 141 MMOL/L (ref 136–145)
TRIGL SERPL-MCNC: 126 MG/DL (ref 30–150)
VIT B12 SERPL-MCNC: 1716 PG/ML (ref 210–950)
WBC # BLD AUTO: 7.62 K/UL (ref 3.9–12.7)

## 2024-04-15 PROCEDURE — 83036 HEMOGLOBIN GLYCOSYLATED A1C: CPT | Performed by: INTERNAL MEDICINE

## 2024-04-15 PROCEDURE — 82607 VITAMIN B-12: CPT | Performed by: INTERNAL MEDICINE

## 2024-04-15 PROCEDURE — 36415 COLL VENOUS BLD VENIPUNCTURE: CPT | Mod: PN | Performed by: INTERNAL MEDICINE

## 2024-04-15 PROCEDURE — 80061 LIPID PANEL: CPT | Performed by: INTERNAL MEDICINE

## 2024-04-15 PROCEDURE — 85025 COMPLETE CBC W/AUTO DIFF WBC: CPT | Mod: PN | Performed by: INTERNAL MEDICINE

## 2024-04-15 PROCEDURE — 80053 COMPREHEN METABOLIC PANEL: CPT | Mod: PN | Performed by: INTERNAL MEDICINE

## 2024-04-22 ENCOUNTER — PATIENT OUTREACH (OUTPATIENT)
Dept: ADMINISTRATIVE | Facility: HOSPITAL | Age: 59
End: 2024-04-22
Payer: COMMERCIAL

## 2024-04-22 ENCOUNTER — OFFICE VISIT (OUTPATIENT)
Dept: FAMILY MEDICINE | Facility: CLINIC | Age: 59
End: 2024-04-22
Payer: COMMERCIAL

## 2024-04-22 VITALS
OXYGEN SATURATION: 97 % | WEIGHT: 150.81 LBS | HEIGHT: 61 IN | TEMPERATURE: 98 F | HEART RATE: 59 BPM | SYSTOLIC BLOOD PRESSURE: 122 MMHG | BODY MASS INDEX: 28.47 KG/M2 | DIASTOLIC BLOOD PRESSURE: 72 MMHG

## 2024-04-22 DIAGNOSIS — R80.9 TYPE 2 DIABETES MELLITUS WITH PROTEINURIA: ICD-10-CM

## 2024-04-22 DIAGNOSIS — K90.0 CELIAC DISEASE: ICD-10-CM

## 2024-04-22 DIAGNOSIS — E66.3 OVERWEIGHT (BMI 25.0-29.9): ICD-10-CM

## 2024-04-22 DIAGNOSIS — K21.9 GASTROESOPHAGEAL REFLUX DISEASE WITHOUT ESOPHAGITIS: ICD-10-CM

## 2024-04-22 DIAGNOSIS — Z78.0 OSTEOPENIA AFTER MENOPAUSE: ICD-10-CM

## 2024-04-22 DIAGNOSIS — I10 ESSENTIAL HYPERTENSION: ICD-10-CM

## 2024-04-22 DIAGNOSIS — Z00.00 ROUTINE MEDICAL EXAM: Primary | ICD-10-CM

## 2024-04-22 DIAGNOSIS — M85.80 OSTEOPENIA AFTER MENOPAUSE: ICD-10-CM

## 2024-04-22 DIAGNOSIS — E11.29 TYPE 2 DIABETES MELLITUS WITH PROTEINURIA: ICD-10-CM

## 2024-04-22 DIAGNOSIS — E78.5 HYPERLIPIDEMIA WITH TARGET LDL LESS THAN 100: ICD-10-CM

## 2024-04-22 DIAGNOSIS — E11.9 TYPE 2 DIABETES MELLITUS WITHOUT COMPLICATION, WITHOUT LONG-TERM CURRENT USE OF INSULIN: ICD-10-CM

## 2024-04-22 PROCEDURE — 3061F NEG MICROALBUMINURIA REV: CPT | Mod: CPTII,S$GLB,, | Performed by: INTERNAL MEDICINE

## 2024-04-22 PROCEDURE — 1160F RVW MEDS BY RX/DR IN RCRD: CPT | Mod: CPTII,S$GLB,, | Performed by: INTERNAL MEDICINE

## 2024-04-22 PROCEDURE — 1159F MED LIST DOCD IN RCRD: CPT | Mod: CPTII,S$GLB,, | Performed by: INTERNAL MEDICINE

## 2024-04-22 PROCEDURE — 3051F HG A1C>EQUAL 7.0%<8.0%: CPT | Mod: CPTII,S$GLB,, | Performed by: INTERNAL MEDICINE

## 2024-04-22 PROCEDURE — 3008F BODY MASS INDEX DOCD: CPT | Mod: CPTII,S$GLB,, | Performed by: INTERNAL MEDICINE

## 2024-04-22 PROCEDURE — 4010F ACE/ARB THERAPY RXD/TAKEN: CPT | Mod: CPTII,S$GLB,, | Performed by: INTERNAL MEDICINE

## 2024-04-22 PROCEDURE — 99396 PREV VISIT EST AGE 40-64: CPT | Mod: S$GLB,,, | Performed by: INTERNAL MEDICINE

## 2024-04-22 PROCEDURE — 3066F NEPHROPATHY DOC TX: CPT | Mod: CPTII,S$GLB,, | Performed by: INTERNAL MEDICINE

## 2024-04-22 PROCEDURE — 3074F SYST BP LT 130 MM HG: CPT | Mod: CPTII,S$GLB,, | Performed by: INTERNAL MEDICINE

## 2024-04-22 PROCEDURE — 3078F DIAST BP <80 MM HG: CPT | Mod: CPTII,S$GLB,, | Performed by: INTERNAL MEDICINE

## 2024-04-22 PROCEDURE — 99999 PR PBB SHADOW E&M-EST. PATIENT-LVL IV: CPT | Mod: PBBFAC,,, | Performed by: INTERNAL MEDICINE

## 2024-04-22 RX ORDER — METOPROLOL TARTRATE 50 MG/1
50 TABLET ORAL 3 TIMES DAILY
Qty: 270 TABLET | Refills: 1 | Status: SHIPPED | OUTPATIENT
Start: 2024-04-22

## 2024-04-22 RX ORDER — IRBESARTAN 75 MG/1
TABLET ORAL
Qty: 90 TABLET | Refills: 1 | Status: SHIPPED | OUTPATIENT
Start: 2024-04-22

## 2024-04-22 RX ORDER — METFORMIN HYDROCHLORIDE 500 MG/1
TABLET, EXTENDED RELEASE ORAL
Qty: 270 TABLET | Refills: 1 | Status: SHIPPED | OUTPATIENT
Start: 2024-04-22

## 2024-04-22 RX ORDER — ATORVASTATIN CALCIUM 20 MG/1
20 TABLET, FILM COATED ORAL DAILY
Qty: 90 TABLET | Refills: 1 | Status: SHIPPED | OUTPATIENT
Start: 2024-04-22

## 2024-04-22 NOTE — LETTER
AUTHORIZATION FOR RELEASE OF   CONFIDENTIAL INFORMATION    Dear Dr. Dick Mendez,    We are seeing Vijaya Montenegro, date of birth 1965, in the clinic at [facility] FAM[facility] MED/ INTERNAL MED/ PEDS. Lanette Hoover MD is the patient's PCP. Vijaya Montenegro has an outstanding lab/procedure at the time we reviewed her chart. In order to help keep her health information updated, she has authorized us to request the following medical record(s):        (  )  MAMMOGRAM                                      (  )  COLONOSCOPY      (  )  PAP SMEAR                                          (  )  OUTSIDE LAB RESULTS     (  )  DEXA SCAN                                          ( x ) EYE EXAM(diabetic) [id number]            (  )  FOOT EXAM                                          (  )  ENTIRE RECORD     (  )  OUTSIDE IMMUNIZATIONS                 (  )  _______________         Please fax records to [facility], [place], Laura A., MD,  [phone].     If you have any questions, please contact Dalila Samuels LPN [facility] at   [phone].     Patient Name: Vijaya Montenegro  : 1965  Patient Phone #: [phone]

## 2024-04-22 NOTE — LETTER
AUTHORIZATION FOR RELEASE OF   CONFIDENTIAL INFORMATION    Dear [facility],    We are seeing Vijaya Montenegro, date of birth 1965, in the clinic at [facility] FAMILY MED/ INTERNAL MED/ PEDS. Lanette Hoover MD is the patient's PCP. Vijaya Montenegro has an outstanding lab/procedure at the time we reviewed her chart. In order to help keep her health information updated, she has authorized us to request the following medical record(s):        (  )  MAMMOGRAM                                      (  )  COLONOSCOPY      ( x ) PAP SMEAR 2023                           (  )  OUTSIDE LAB RESULTS     (  )  DEXA SCAN                                          (  )  EYE EXAM            (  )  FOOT EXAM                                          (  )  ENTIRE RECORD     (  )  OUTSIDE IMMUNIZATIONS                 (  )  _______________         Please fax records to [facility], [place], Laura A., MD,  [phone].     If you have any questions, please contact Dalila Samuels LPN [facility] at   [phone].     Patient Name: Vijaya Montenegro  : 1965  Patient Phone #: [phone]     Clinic Address: [address] [place] [address] [place] [address]  ATTN: Medical Records external lab used ( [facility] )

## 2024-04-22 NOTE — PROGRESS NOTES
Patient had health maintenance eye exam  done outside [facility] . Message sent to cc coordinator for results to update chart.

## 2024-04-22 NOTE — PROGRESS NOTES
CHIEF COMPLAINT:   Chief Complaint   Patient presents with    Annual Exam          HISTORY OF PRESENT ILLNESS:  Vijaya Montenegro is a 58 y.o. female who presents to the clinic today for a routine physical exam. Her last physical exam was approximately 1 years(s) ago.    Subjective    PAST MEDICAL HISTORY:  Past Medical History:   Diagnosis Date    Anxiety     Celiac disease 2021    GERD (gastroesophageal reflux disease)     Gout, arthritis     H. pylori infection     treated    Hyperlipidemia LDL goal < 100     Hypertension     Obesity     Type II or unspecified type diabetes mellitus without mention of complication, not stated as uncontrolled     diet controlled       PAST SURGICAL HISTORY:  Past Surgical History:   Procedure Laterality Date     SECTION, LOW TRANSVERSE      x2    REFRACTIVE SURGERY Bilateral     [facility]       SOCIAL HISTORY:  Social History     Socioeconomic History    Marital status:     Number of children: 2   Tobacco Use    Smoking status: Never    Smokeless tobacco: Never   Substance and Sexual Activity    Alcohol use: Yes    Drug use: No    Sexual activity: Yes     Partners: Male     Social Determinants of Health     Financial Resource Strain: Low Risk  (10/11/2023)    Overall Financial Resource Strain (CARDIA)     Difficulty of Paying Living Expenses: Not hard at all   Food Insecurity: No Food Insecurity (10/11/2023)    Hunger Vital Sign     Worried About Running Out of Food in the Last Year: Never true     Ran Out of Food in the Last Year: Never true   Transportation Needs: No Transportation Needs (10/11/2023)    PRAPARE - Transportation     Lack of Transportation (Medical): No     Lack of Transportation (Non-Medical): No   Physical Activity: Insufficiently Active (10/11/2023)    Exercise Vital Sign     Days of Exercise per Week: 4 days     Minutes of Exercise per Session: 30 min   Stress: No Stress Concern Present (10/11/2023)    [employer] [employer] - Occupational Stress Questionnaire     Feeling of Stress : Only a little   Social Connections: Unknown (10/11/2023)    Social Connection and Isolation Panel [NHANES]     Frequency of Communication with Friends and Family: More than three times a week     Frequency of Social Gatherings with Friends and Family: More than three times a week     Active Member of Clubs or Organizations: No     Attends Club or Organization Meetings: Never     Marital Status:    Housing Stability: Low Risk  (10/11/2023)    Housing Stability Vital Sign     Unable to Pay for Housing in the Last Year: No     Number of Places Lived in the Last Year: 2     Unstable Housing in the Last Year: No       FAMILY HISTORY:  Family History   Problem Relation Name Age of Onset    Hypertension Father      Bladder Cancer Father      Cancer Father          bladder    Diabetes Sister Graciela     Heart attack Maternal Grandmother      Cataracts Maternal Grandmother      COPD Mother      Blindness Mother          left eye; eye injury    Thyroid disease Mother      No Known Problems Maternal Grandfather      Diabetes Paternal Grandmother      No Known Problems Paternal Grandfather      Diabetes Paternal Aunt      Diabetes Paternal Aunt      No Known Problems Maternal Aunt      No Known Problems Maternal Uncle      No Known Problems Paternal Uncle      Breast cancer Maternal Cousin Adina     Colon cancer Neg Hx      Amblyopia Neg Hx      Glaucoma Neg Hx      Macular degeneration Neg Hx      Retinal detachment Neg Hx      Strabismus Neg Hx      Stroke Neg Hx         ALLERGIES AND MEDICATIONS: updated and reviewed.  Review of patient's allergies indicates:   Allergen Reactions    Codeine Other (See Comments)     Medication List with Changes/Refills   Current Medications    ALPRAZOLAM (XANAX) 0.25 MG TABLET    Take 1 tablet (0.25 mg total) by mouth daily as needed for Anxiety.    KETOROLAC (TORADOL) 10 MG TABLET    Take 1  tablet (10 mg total) by mouth every 6 (six) hours as needed (gout). Take with food.    LANCETS 31 GAUGE MISC    1 lancet by Misc.(Non-Drug; Combo Route) route 2 (two) times daily. Test BID    TRAZODONE (DESYREL) 50 MG TABLET    Take 1 tablet (50 mg total) by mouth every evening.   Changed and/or Refilled Medications    Modified Medication Previous Medication    ATORVASTATIN (LIPITOR) 20 MG TABLET atorvastatin (LIPITOR) 20 MG tablet       Take 1 tablet (20 mg total) by mouth once daily.    Take 1 tablet (20 mg total) by mouth once daily.    IRBESARTAN (AVAPRO) 75 MG TABLET irbesartan (AVAPRO) 75 MG tablet       TAKE ONE TABLET BY MOUTH EVERY EVENING.    TAKE ONE TABLET BY MOUTH EVERY EVENING.    METFORMIN (GLUCOPHAGE-XR) 500 MG ER 24HR TABLET metFORMIN (GLUCOPHAGE-XR) 500 MG ER 24hr tablet       2 tablets by mouth with breakfast and 1 tablet with dinner    2 tablets by mouth with breakfast and 1 tablet with dinner    METOPROLOL TARTRATE (LOPRESSOR) 50 MG TABLET metoprolol tartrate (LOPRESSOR) 50 MG tablet       Take 1 tablet (50 mg total) by mouth 3 (three) times daily.    Take 1 tablet (50 mg total) by mouth 3 (three) times daily.          CARE TEAM:  Patient Care Team:  Lanette Hoover MD as PCP - General (Internal Medicine)  Chidi Easley II, MD as PCP - OBGYN (Obstetrics)  Lanette Hoover MD as Hypertension Digital Medicine Responsible Provider (Internal Medicine)  Lanette Hoover MD as Diabetes Digital Medicine Responsible Provider (Internal Medicine)  Dalila Samuels LPN as Care Coordinator  Dick Mendez OD as Consulting Physician (Ophthalmology)  Rodolfo Tubbs PA-C as Hypertension Digital Medicine Clinician (Physician Assistant)  Rodolfo Tubbs PA-C as Diabetes Digital Medicine Clinician (Physician Assistant)  Monserrat Matias as Digital Medicine Health        SCREENING HISTORY:  Health Maintenance         Date Due Completion Date    TETANUS VACCINE 07/05/2023  7/5/2013    COVID-19 Vaccine (3 - 2023-24 season) 09/01/2023 4/12/2021    Foot Exam 10/17/2023 10/17/2022    Eye Exam 11/04/2023 11/4/2022    Override on 8/15/2016: Done    Override on 6/10/2014: Done (normal per patient)    Hemoglobin A1c 10/15/2024 4/15/2024    Override on 11/10/2014: Done ([facility] - 6.6)    DEXA Scan 10/17/2024 10/17/2022    Diabetes Urine Screening 04/15/2025 4/15/2024    Lipid Panel 04/15/2025 4/15/2024    Override on 11/10/2014: Done (Total cholesterol 205, HDL 52, triglycerides 252, and )    Low Dose Statin 04/22/2025 4/22/2024    Mammogram 12/06/2025 12/6/2023    Override on 2/27/2017: Done (done at [facility])    Override on 2/1/2014: Done    Override on 10/3/2011: Done    Override on 12/5/2006: Done    Cervical Cancer Screening 04/13/2027 4/13/2022    Override on 1/19/2015: Done ([employer])    Override on 10/1/2012: Done    Override on 11/16/2006: Done    Colorectal Cancer Screening 05/17/2027 5/17/2017              REVIEW OF SYSTEMS:  The patient reports : fair dietary habits. She admits that she is not always able to adhere to her diabetic diet as she should.  The patient reports  : that they do not exercise regularly, but stay active.  Review of Systems   Constitutional:  Negative for chills, fatigue, fever and unexpected weight change.   HENT:  Negative for congestion and postnasal drip.    Eyes:  Negative for pain and visual disturbance.   Respiratory:  Negative for cough, shortness of breath and wheezing.    Cardiovascular:  Negative for chest pain, palpitations and leg swelling.   Gastrointestinal:  Negative for abdominal pain, constipation, diarrhea, nausea and vomiting.        She reports a lot of problems with heart burn - mostly diet related. Has been trying to decrease her omeprazole intake but with increase in symptom days.   Genitourinary:  Negative for dysuria.   Musculoskeletal:  Negative for arthralgias and back pain.   Skin:  Negative for rash.  "  Neurological:  Negative for weakness and headaches.        She thinks she may have some bilateral CTS - often wakes up with pain in hands that she feels she needs to shake out. Has not been wearing her wrist braces at night while sleeping.   Psychiatric/Behavioral:  Negative for dysphoric mood and sleep disturbance. The patient is not nervous/anxious.       ROS (Optional)-: no pelvic pain  Breast ROS (Optional)-: negative for breast lumps/discharge          Objective    PHYSICAL EXAMINATION/VITALS:  Vitals:    04/22/24 1053   BP: 122/72   Pulse: (!) 59   Temp: 98 °F (36.7 °C)   TempSrc: Oral   SpO2: 97%   Weight: 68.4 kg (150 lb 12.7 oz)   Height: 5' 1" (1.549 m)        Body mass index is 28.49 kg/m².      General appearance - alert, well appearing, and in no distress, overweight  Psychiatric - alert, oriented to person, place, and time, normal behavior, speech, dress, motor activity and thought process  Eyes - pupils equal and reactive, extraocular eye movements intact, sclera anicteric  Neck - supple, no significant adenopathy, carotids upstroke normal bilaterally, no bruits  Lymphatics - no palpable cervical lymphadenopathy  Chest - clear to auscultation, no wheezes, rales or rhonchi, symmetric air entry  Heart - normal rate and regular rhythm, no gallops noted  Neurological - alert, normal speech, no focal findings or movement disorder noted; cranial nerves II through XII intact  Musculoskeletal - no joint tenderness, deformity or swelling, no muscular tenderness noted  Extremities - no pedal edema noted  Skin - normal coloration, no suspicious skin lesions  Diabetic Foot Exam                                                                                                                                                                                                                      Protective Sensation (w/ 10 gram monofilament):  Right: Intact  Left: Intact    Visual Inspection:  Normal -  " Bilateral    Pedal Pulses:   Right: Present  Left: Present    Posterior Tibialis Pulses:   Right:Present  Left: Present         LABS:  Lab Results   Component Value Date    HGBA1C 7.0 (H) 04/15/2024    HGBA1C 6.5 (H) 10/13/2023    HGBA1C 6.8 (H) 04/10/2023      Lab Results   Component Value Date    CHOL 152 04/15/2024    CHOL 148 04/10/2023    CHOL 143 04/09/2022     Lab Results   Component Value Date    LDLCALC 78.8 04/15/2024    LDLCALC 84.8 04/10/2023    LDLCALC 79.4 04/09/2022                 ASSESSMENT AND PLAN:   1. Routine medical exam  Counseled on age appropriate medical preventative services including age appropriate cancer screenings, age appropriate eye and dental exams, over all nutritional health, need for a consistent exercise regimen, and an over all push towards maintaining a vigorous and active lifestyle.  Counseled on age appropriate vaccines and discussed upcoming health care needs based on age/gender. Discussed good sleep hygiene and stress management.    2. Type 2 diabetes mellitus with proteinuria  Lab Results   Component Value Date    HGBA1C 7.0 (H) 04/15/2024     Diabetes is under good control at this time for age and comorbid conditions.   We discussed diabetic diet and regular exercise.   We discussed home blood sugar monitoring, if appropriate - the patient should test twice daily and as needed.   Continue current medication regimen.  Recheck A1c in 6 months.  She will do better with diabetic diet and regular physical activity.  Diabetic complications addressed: None addressed today.  Patient was counseled on the need for yearly eye exam to screen for/monitor diabetic retinopathy and yearly diabetic foot exam.    3. Essential hypertension  BP Readings from Last 1 Encounters:   04/22/24 122/72      The current medical regimen is effective;  continue present plan and medications. Recommended patient to check home readings to monitor and see me for followup as scheduled or sooner as needed.    Discussed sodium restriction, maintaining ideal body weight and regular exercise program as physiologic means to continue to achieve blood pressure control in addition to medication compliance.  Patient was educated that both decongestant and anti-inflammatory medication may raise blood pressure.  The patient is active on the digital hypertension program.  -     metoprolol tartrate (LOPRESSOR) 50 MG tablet; Take 1 tablet (50 mg total) by mouth 3 (three) times daily.  Dispense: 270 tablet; Refill: 1  -     irbesartan (AVAPRO) 75 MG tablet; TAKE ONE TABLET BY MOUTH EVERY EVENING.  Dispense: 90 tablet; Refill: 1    4. Gastroesophageal reflux disease without esophagitis  She has had an increase in symptoms recently. Has had H. Pylori in the past. Will check for re-infection. Advised to take PPI as needed. Will continue to treat with diet whenever possible.  -     H. pylori antigen, stool; Future; Expected date: 04/22/2024    5. Celiac disease  The current medical regimen is effective;  continue present plan and medications.     6. Osteopenia after menopause  We discussed adequate calcium intake (preferably from diet) and vitamin D supplementation. We discussed fall precautions. She is up to date on her BMD. No need for prescription medication at this time.  Overview:  - 10/2022 - No need for Rx tx at this time.        7. Overweight (BMI 25.0-29.9)  BMI Readings from Last 3 Encounters:   04/22/24 28.49 kg/m²   10/18/23 27.68 kg/m²   04/17/23 27.68 kg/m²     The patient is asked to continue to make an attempt to improve diet and exercise patterns to aid in medical management of this problem.       8. Hyperlipidemia with target LDL less than 100  Lab Results   Component Value Date    CHOL 152 04/15/2024     Lab Results   Component Value Date    HDL 48 04/15/2024     Lab Results   Component Value Date    LDLCALC 78.8 04/15/2024     Lab Results   Component Value Date    TRIG 126 04/15/2024     Lab Results   Component Value  Date    LDLCALC 78.8 04/15/2024     We discussed low fat diet and regular exercise.The current medical regimen is effective;  continue present plan and medications.   -     atorvastatin (LIPITOR) 20 MG tablet; Take 1 tablet (20 mg total) by mouth once daily.  Dispense: 90 tablet; Refill: 1             Orders Placed This Encounter   Procedures    H. pylori antigen, stool      FOLLOW UP: Follow up in about 6 months (around 10/22/2024), or if symptoms worsen or fail to improve, for follow up chronic medical conditions.. or sooner as needed.

## 2024-04-23 ENCOUNTER — PATIENT OUTREACH (OUTPATIENT)
Dept: ADMINISTRATIVE | Facility: HOSPITAL | Age: 59
End: 2024-04-23
Payer: COMMERCIAL

## 2024-05-06 ENCOUNTER — LAB VISIT (OUTPATIENT)
Dept: LAB | Facility: HOSPITAL | Age: 59
End: 2024-05-06
Attending: INTERNAL MEDICINE
Payer: COMMERCIAL

## 2024-05-06 DIAGNOSIS — K21.9 GASTROESOPHAGEAL REFLUX DISEASE WITHOUT ESOPHAGITIS: ICD-10-CM

## 2024-05-06 PROCEDURE — 87338 HPYLORI STOOL AG IA: CPT | Performed by: INTERNAL MEDICINE

## 2024-05-14 DIAGNOSIS — A04.8 H. PYLORI INFECTION: Primary | ICD-10-CM

## 2024-05-14 LAB — H PYLORI AG STL QL IA: DETECTED

## 2024-05-14 RX ORDER — AZITHROMYCIN 500 MG/1
500 TABLET, FILM COATED ORAL DAILY
Qty: 3 TABLET | Refills: 0 | Status: SHIPPED | OUTPATIENT
Start: 2024-05-14 | End: 2024-05-17

## 2024-05-14 RX ORDER — OMEPRAZOLE 20 MG/1
20 CAPSULE, DELAYED RELEASE ORAL 2 TIMES DAILY
Qty: 28 CAPSULE | Refills: 0 | Status: SHIPPED | OUTPATIENT
Start: 2024-05-14 | End: 2024-05-28

## 2024-05-14 RX ORDER — AMOXICILLIN 500 MG/1
1000 TABLET, FILM COATED ORAL EVERY 12 HOURS
Qty: 56 TABLET | Refills: 0 | Status: SHIPPED | OUTPATIENT
Start: 2024-05-14 | End: 2024-05-28

## 2024-07-09 ENCOUNTER — TELEPHONE (OUTPATIENT)
Dept: FAMILY MEDICINE | Facility: CLINIC | Age: 59
End: 2024-07-09
Payer: COMMERCIAL

## 2024-07-09 DIAGNOSIS — K21.9 GASTROESOPHAGEAL REFLUX DISEASE, UNSPECIFIED WHETHER ESOPHAGITIS PRESENT: Primary | ICD-10-CM

## 2024-07-09 NOTE — TELEPHONE ENCOUNTER
Spoke with pt, tested positive in May for H pylori. Symptoms somewhat resolved during treatment. Patient states as so as treatment finished, symptoms came right back. Per notes possible GI REFERRAL. If GI referral needed pt request [place]

## 2024-07-09 NOTE — TELEPHONE ENCOUNTER
----- Message from Sherry Bhupinder sent at 7/9/2024 11:42 AM CDT -----  Regarding: SELF [phone]  Type: Patient Call Back     Who called:SELF     What is the request in detail: pt is calling because she would like to know if she needs more antibiotics because she has tested positive for H Pylori once again     Can the clinic reply by [facility]?-Yes     Would the patient rather a call back or a response via [facility]?-Either     Best call back number: [phone]

## 2024-07-09 NOTE — TELEPHONE ENCOUNTER
----- Message from Kane Sharma sent at 7/9/2024  2:36 PM CDT -----  Regarding: Self .[phone]   Type: Patient Returning Call    Who Called: Self    Who Left Message for Patient: Kitty Leiva    Does the patient know what this is regarding?: yes    Would the patient rather a call back or a response via [facility]?  Call back     Best Call Back Number: .[phone]      Additional Information:     Thank you.

## 2024-07-21 ENCOUNTER — PATIENT MESSAGE (OUTPATIENT)
Dept: ADMINISTRATIVE | Facility: OTHER | Age: 59
End: 2024-07-21
Payer: COMMERCIAL

## 2024-07-27 ENCOUNTER — PATIENT MESSAGE (OUTPATIENT)
Dept: ADMINISTRATIVE | Facility: OTHER | Age: 59
End: 2024-07-27
Payer: COMMERCIAL

## 2024-08-02 ENCOUNTER — LAB VISIT (OUTPATIENT)
Dept: LAB | Facility: HOSPITAL | Age: 59
End: 2024-08-02
Payer: COMMERCIAL

## 2024-08-02 DIAGNOSIS — E11.29 TYPE 2 DIABETES MELLITUS WITH PROTEINURIA: ICD-10-CM

## 2024-08-02 DIAGNOSIS — R80.9 TYPE 2 DIABETES MELLITUS WITH PROTEINURIA: ICD-10-CM

## 2024-08-02 LAB
ESTIMATED AVG GLUCOSE: 143 MG/DL (ref 68–131)
HBA1C MFR BLD: 6.6 % (ref 4–5.6)

## 2024-08-02 PROCEDURE — 83036 HEMOGLOBIN GLYCOSYLATED A1C: CPT | Performed by: INTERNAL MEDICINE

## 2024-08-02 PROCEDURE — 36415 COLL VENOUS BLD VENIPUNCTURE: CPT | Mod: PO | Performed by: INTERNAL MEDICINE

## 2024-08-19 ENCOUNTER — PATIENT MESSAGE (OUTPATIENT)
Dept: ADMINISTRATIVE | Facility: HOSPITAL | Age: 59
End: 2024-08-19
Payer: COMMERCIAL

## 2024-08-20 ENCOUNTER — PATIENT OUTREACH (OUTPATIENT)
Dept: ADMINISTRATIVE | Facility: HOSPITAL | Age: 59
End: 2024-08-20
Payer: COMMERCIAL

## 2024-08-20 DIAGNOSIS — Z13.820 ENCOUNTER FOR SCREENING FOR OSTEOPOROSIS: Primary | ICD-10-CM

## 2024-08-20 RX ORDER — OMEPRAZOLE 20 MG/1
1 CAPSULE, DELAYED RELEASE ORAL
COMMUNITY

## 2024-08-20 RX ORDER — AMOXICILLIN 500 MG/1
CAPSULE ORAL
COMMUNITY
Start: 2024-05-14

## 2024-08-20 RX ORDER — PRAVASTATIN SODIUM 10 MG/1
TABLET ORAL
COMMUNITY

## 2024-08-20 RX ORDER — IRBESARTAN 150 MG/1
TABLET ORAL
COMMUNITY

## 2024-08-20 RX ORDER — METOPROLOL TARTRATE 50 MG/1
TABLET ORAL
COMMUNITY

## 2024-08-20 RX ORDER — HYDROCHLOROTHIAZIDE 12.5 MG/1
1 CAPSULE ORAL
COMMUNITY

## 2024-08-20 RX ORDER — DESOGESTREL AND ETHINYL ESTRADIOL 21-5 (28)
KIT ORAL
COMMUNITY

## 2024-08-21 NOTE — PROGRESS NOTES
[facility] Telemedicine Consult Note    The patient location is: home   The chief complaint leading to consultation is: abdominal pain     Visit type: audiovisual    Face to Face time with patient: 15  30 minutes of total time spent on the encounter, which includes face to face time and non-face to face time preparing to see the patient (eg, review of tests), Obtaining and/or reviewing separately obtained history, Documenting clinical information in the electronic or other health record, Independently interpreting results (not separately reported) and communicating results to the patient/family/caregiver, or Care coordination (not separately reported).       Each patient to whom he or she provides medical services by telemedicine is:  (1) informed of the relationship between the physician and patient and the respective role of any other health care provider with respect to management of the patient; and (2) notified that he or she may decline to receive medical services by telemedicine and may withdraw from such care at any time.       PCP:   Lanette Hoover   [address] / [place] [id number]    Referring MD:  [facility] Hoover Md  [address] [facility]    HPI:  This is a 58 y.o. female here for evaluation of abdominal pain and reflux. PMHx of Anxiety, GERD, HLD, HTN, DM. She was told 3-4 years ago at OSH that she had elevated celiac antibodies. She was unable to afford EGD at that time so just started a GFD. She also was tested and found positive for H pylori at that time and was treated. She did well for a few years on GFD but recently she is finding GFD and a diabetic diet hard to manage. She is currently eating gluten on a regular basis. At time of diagnosis, she was having abdominal pain and diarrhea. Currently, no diarrhea issues. She does have abdominal pain with meals and some reflux. Has to sleep elevated. She was tested for HP 3 months ago and was positive.  She was treated with antibiotics which resolved symptoms while she was on treated but recurred after. No test of cure yet done. Currently using gaviscon (generic) for reflux. Also has a sensation of food sticking in her chest if she is eating fast. No issues if she takes her time with eating. No N/V, blood in stool or hematemesis.      Last celiac panels are from  and showed mild elevation in antigliadin ab and TTG IgA. CBC and CMP wnl. No EGD on file. No recent abdominal imaging. Cscope in 2017 showed hemorrhoids.     No FH of CRC or gastric cancer. No other members with celiac.       Medical History:  has a past medical history of Anxiety, Celiac disease (2021), GERD (gastroesophageal reflux disease), Gout, arthritis, H. pylori infection (), Hyperlipidemia LDL goal < 100, Hypertension, Obesity, and Type II or unspecified type diabetes mellitus without mention of complication, not stated as uncontrolled.    Surgical History:  has a past surgical history that includes  section, low transverse and Refractive surgery (Bilateral).    Family History: family history includes Bladder Cancer in her father; Blindness in her mother; Breast cancer in her maternal cousin; COPD in her mother; Cancer in her father; Cataracts in her maternal grandmother; Diabetes in her paternal aunt, paternal aunt, paternal grandmother, and sister; Heart attack in her maternal grandmother; Hypertension in her father; No Known Problems in her maternal aunt, maternal grandfather, maternal uncle, paternal grandfather, and paternal uncle; Thyroid disease in her mother..     Social History:  reports that she has never smoked. She has never used smokeless tobacco. She reports current alcohol use. She reports that she does not use drugs.       Imaging:  Reviewed     Endoscopy:      Cscope 2017   Hemorrhoids     Assessment:    Abdominal pain   Reflux   History of HP gastritis   Elevated celiac antibodies     Patient was told she had  celiac disease 3-4 years ago at OSH with blood work and she was unable to afford EGD to confirm diagnosis. She did GFD for a few years which did help but now she finds it difficult to follow as she is also trying to follow a DM diet. Celiac panels here have shown some mild elevations in the past - unsure if she was eating gluten at these times. Will repeat today as she is ingesting gluten and plan for EGD with biopsies. Will also biopsy stomach due to HP history     Recommendations:    - Celiac panel   - EGD with biopsies gastric and small bowel   - Cscope in 2027 for screening   - Pepcid for reflux for now    RTC after endoscopy     Order summary:  Orders Placed This Encounter    Celiac Disease Panel         Thank you so much for allowing me to participate in the care of Vijaya Orozco MD

## 2024-08-22 ENCOUNTER — TELEPHONE (OUTPATIENT)
Dept: ENDOSCOPY | Facility: HOSPITAL | Age: 59
End: 2024-08-22
Payer: COMMERCIAL

## 2024-08-22 ENCOUNTER — OFFICE VISIT (OUTPATIENT)
Dept: GASTROENTEROLOGY | Facility: CLINIC | Age: 59
End: 2024-08-22
Payer: COMMERCIAL

## 2024-08-22 DIAGNOSIS — K21.9 GASTROESOPHAGEAL REFLUX DISEASE WITHOUT ESOPHAGITIS: ICD-10-CM

## 2024-08-22 DIAGNOSIS — K90.9 INTESTINAL MALABSORPTION, UNSPECIFIED TYPE: Primary | ICD-10-CM

## 2024-08-22 DIAGNOSIS — K21.9 GASTROESOPHAGEAL REFLUX DISEASE, UNSPECIFIED WHETHER ESOPHAGITIS PRESENT: ICD-10-CM

## 2024-08-22 PROCEDURE — 4010F ACE/ARB THERAPY RXD/TAKEN: CPT | Mod: CPTII,95,, | Performed by: STUDENT IN AN ORGANIZED HEALTH CARE EDUCATION/TRAINING PROGRAM

## 2024-08-22 PROCEDURE — 3061F NEG MICROALBUMINURIA REV: CPT | Mod: CPTII,95,, | Performed by: STUDENT IN AN ORGANIZED HEALTH CARE EDUCATION/TRAINING PROGRAM

## 2024-08-22 PROCEDURE — 3044F HG A1C LEVEL LT 7.0%: CPT | Mod: CPTII,95,, | Performed by: STUDENT IN AN ORGANIZED HEALTH CARE EDUCATION/TRAINING PROGRAM

## 2024-08-22 PROCEDURE — 3066F NEPHROPATHY DOC TX: CPT | Mod: CPTII,95,, | Performed by: STUDENT IN AN ORGANIZED HEALTH CARE EDUCATION/TRAINING PROGRAM

## 2024-08-22 PROCEDURE — 99204 OFFICE O/P NEW MOD 45 MIN: CPT | Mod: 95,,, | Performed by: STUDENT IN AN ORGANIZED HEALTH CARE EDUCATION/TRAINING PROGRAM

## 2024-08-22 NOTE — TELEPHONE ENCOUNTER
"----- Message from Clover Díaz MA sent at 8/22/2024  3:09 PM CDT -----    ----- Message -----  From: Marcela Orozco MD  Sent: 8/22/2024  12:07 PM CDT  To: [facility]    Procedure: EGD    Diagnosis: GERD, elevated celiac antibodies     Procedure Timing: Within 4 weeks (Urgent)    #If within 4 weeks selected, please goerges as high priority#    #If greater than 12 weeks, please select "5-12 weeks" and delay sending until 3 months prior to requested date#     Location: Any Site    Additional Scheduling Information: No scheduling concerns    Prep Specifications:N/A    Is the patient taking a GLP-1 Agonist:no    Have you attached a patient to this message: yes  "

## 2024-08-22 NOTE — TELEPHONE ENCOUNTER
Spoke to pt to schedule procedure(s) Upper Endoscopy (EGD)       Physician to perform procedure(s) Dr. SUKUMAR Orozco  Date of Procedure (s) 9/9/24  Arrival Time 10:30 AM  Time of Procedure(s) 11:30 AM   Location of Procedure(s) [place] 2nd Floor  Type of Rx Prep sent to patient: N/A  Instructions provided to patient via [facility]    Patient was informed on the following information and verbalized understanding. Screening questionnaire reviewed with patient and complete. If procedure requires anesthesia, a responsible adult needs to be present to accompany the patient home, patient cannot drive after receiving anesthesia. Appointment details are tentative, especially check-in time. Patient will receive a prep-op call 7 days prior to confirm check-in time for procedure. If applicable the patient should contact their pharmacy to verify Rx for procedure prep is ready for pick-up. Patient was advised to call the scheduling department at [phone] if pharmacy states no Rx is available. Patient was advised to call the endoscopy scheduling department if any questions or concerns arise.      [facility] Scheduling Department

## 2024-08-28 ENCOUNTER — LAB VISIT (OUTPATIENT)
Dept: LAB | Facility: HOSPITAL | Age: 59
End: 2024-08-28
Attending: STUDENT IN AN ORGANIZED HEALTH CARE EDUCATION/TRAINING PROGRAM
Payer: COMMERCIAL

## 2024-08-28 DIAGNOSIS — K21.9 GASTROESOPHAGEAL REFLUX DISEASE, UNSPECIFIED WHETHER ESOPHAGITIS PRESENT: ICD-10-CM

## 2024-08-28 PROCEDURE — 86364 TISS TRNSGLTMNASE EA IG CLAS: CPT | Performed by: STUDENT IN AN ORGANIZED HEALTH CARE EDUCATION/TRAINING PROGRAM

## 2024-08-28 PROCEDURE — 36415 COLL VENOUS BLD VENIPUNCTURE: CPT | Mod: PO | Performed by: STUDENT IN AN ORGANIZED HEALTH CARE EDUCATION/TRAINING PROGRAM

## 2024-08-29 ENCOUNTER — TELEPHONE (OUTPATIENT)
Dept: ENDOSCOPY | Facility: HOSPITAL | Age: 59
End: 2024-08-29
Payer: COMMERCIAL

## 2024-08-29 NOTE — TELEPHONE ENCOUNTER
Spoke to patient for pre-call to confirm scheduled Upper Endoscopy (EGD) and patient verbalized understanding of the following:       Date & arrival time of procedure(s) verified 9/9/24, 10:30 AM.  Location of procedure(s) [place] 2nd Floor verified.  NPO status reinforced. Ok to continue clear liquids until 7:30 AM.   Patient denies use of blood thinners, GLP-1 medications, and weight loss medications.  Patient confirmed receipt of prep instructions.  Instructions provided to patient via [facility].  Patient confirmed ride home after procedure if procedure requires anesthesia.   Pre-call screening questionnaire reviewed and completed with patient.   Appointment details are tentative, including check-in time.  If the patient begins taking any blood thinning medications, injectable weight loss/diabetes medications (other than insulin), or Adipex (phentermine) patient was instructed to contact the endoscopy scheduling department as soon as possible.  Patient was advised to call the endoscopy scheduling department if any questions or concerns arise.      Endoscopy Scheduling Department

## 2024-09-03 LAB
GLIADIN PEPTIDE IGA SER-ACNC: 0.9 U/ML
GLIADIN PEPTIDE IGG SER-ACNC: 0.6 U/ML
IGA SERPL-MCNC: 204 MG/DL (ref 70–400)
TTG IGA SER-ACNC: 0.6 U/ML
TTG IGG SER-ACNC: 1.6 U/ML

## 2024-09-05 ENCOUNTER — ANESTHESIA EVENT (OUTPATIENT)
Dept: ENDOSCOPY | Facility: HOSPITAL | Age: 59
End: 2024-09-05
Payer: COMMERCIAL

## 2024-09-05 NOTE — H&P
Short Stay Endoscopy History and Physical    PCP - Lanette Hoover MD  Referring Physician - Marcela Orozco MD  [address] [place] [address]    Procedure - EGD  ASA - per anesthesia  Mallampati - per anesthesia  History of Anesthesia problems - no  Family history Anesthesia problems -  no   Plan of anesthesia - General    HPI  58 y.o. female  Reason for procedure:  Intestinal malabsorption, unspecified type [K90.9]  Gastroesophageal reflux disease without esophagitis [K21.9]      ROS:  Constitutional: No fevers, chills, No weight loss  CV: No chest pain  Pulm: No cough, No shortness of breath  GI: see HPI    Medical History:  has a past medical history of Anxiety, Celiac disease (2021), GERD (gastroesophageal reflux disease), Gout, arthritis, H. pylori infection (), Hyperlipidemia LDL goal < 100, Hypertension, Obesity, and Type II or unspecified type diabetes mellitus without mention of complication, not stated as uncontrolled.    Surgical History:  has a past surgical history that includes  section, low transverse and Refractive surgery (Bilateral).    Family History: family history includes Bladder Cancer in her father; Blindness in her mother; Breast cancer in her maternal cousin; COPD in her mother; Cancer in her father; Cataracts in her maternal grandmother; Diabetes in her paternal aunt, paternal aunt, paternal grandmother, and sister; Heart attack in her maternal grandmother; Hypertension in her father; No Known Problems in her maternal aunt, maternal grandfather, maternal uncle, paternal grandfather, and paternal uncle; Thyroid disease in her mother..    Social History:  reports that she has never smoked. She has never used smokeless tobacco. She reports current alcohol use. She reports that she does not use drugs.    Review of patient's allergies indicates:   Allergen Reactions    Codeine Other (See Comments)       Medications:   Medications Prior to Admission    Medication Sig Dispense Refill Last Dose    ALPRAZolam (XANAX) 0.25 MG tablet Take 1 tablet (0.25 mg total) by mouth daily as needed for Anxiety. (Patient not taking: Reported on 10/18/2023) 30 tablet 0     amoxicillin (AMOXIL) 500 MG capsule        atorvastatin (LIPITOR) 20 MG tablet Take 1 tablet (20 mg total) by mouth once daily. 90 tablet 1     desog-e.estradioL/e.estradioL (KARIVA, 28,) 0.15-0.02 mgx21 /0.01 mg x 5 per tablet 1 tablet Orally Once a day       irbesartan (AVAPRO) 150 MG tablet Take 1 tablet (150 mg total) by mouth every evening. 90 tablet 1     ketorolac (TORADOL) 10 mg tablet Take 1 tablet (10 mg total) by mouth every 6 (six) hours as needed (gout). Take with food. 20 tablet 0     lancets 31 gauge Misc 1 lancet by Misc.(Non-Drug; Combo Route) route 2 (two) times daily. Test  each 5     metFORMIN (GLUCOPHAGE-XR) 500 MG ER 24hr tablet 2 tablets by mouth with breakfast and 1 tablet with dinner 270 tablet 1     metoprolol tartrate (LOPRESSOR) 50 MG tablet Taking 1 tablet by mouth in AM and 2 tablets by mouth in the PM       omeprazole (PRILOSEC) 20 MG capsule 1 capsule.       pravastatin (PRAVACHOL) 10 MG tablet 1 tablet Orally Once a day       traZODone (DESYREL) 50 MG tablet Take 1 tablet (50 mg total) by mouth every evening. 90 tablet 3        Physical Exam:    Vital Signs: There were no vitals filed for this visit.    General Appearance: Well appearing in no acute distress  Abdomen: Soft, non tender, non distended with normal bowel sounds, no masses      Labs:  Lab Results   Component Value Date    WBC 7.62 04/15/2024    HGB 13.5 04/15/2024    HCT 40.0 04/15/2024     04/15/2024    CHOL 152 04/15/2024    TRIG 126 04/15/2024    HDL 48 04/15/2024    ALT 18 04/15/2024    AST 16 04/15/2024     04/15/2024    K 4.6 04/15/2024     04/15/2024    CREATININE 0.9 04/15/2024    BUN 17 04/15/2024    CO2 25 04/15/2024    TSH 2.901 04/07/2021    HGBA1C 6.6 (H) 08/02/2024       I have  explained the risks and benefits of this endoscopic procedure to the patient including but not limited to bleeding, inflammation, infection, perforation, and death.    Assessment/Plan:     Abdominal pain   Reflux   Possible celiac disease?     - Proceed with EGD     Marcela Orozco MD  Gastroenterology   [facility]

## 2024-09-09 ENCOUNTER — HOSPITAL ENCOUNTER (OUTPATIENT)
Facility: HOSPITAL | Age: 59
Discharge: HOME | End: 2024-09-09
Attending: STUDENT IN AN ORGANIZED HEALTH CARE EDUCATION/TRAINING PROGRAM | Admitting: STUDENT IN AN ORGANIZED HEALTH CARE EDUCATION/TRAINING PROGRAM
Payer: COMMERCIAL

## 2024-09-09 ENCOUNTER — ANESTHESIA (OUTPATIENT)
Dept: ENDOSCOPY | Facility: HOSPITAL | Age: 59
End: 2024-09-09
Payer: COMMERCIAL

## 2024-09-09 VITALS
HEIGHT: 61 IN | TEMPERATURE: 98 F | BODY MASS INDEX: 27.56 KG/M2 | HEART RATE: 66 BPM | SYSTOLIC BLOOD PRESSURE: 114 MMHG | WEIGHT: 146 LBS | DIASTOLIC BLOOD PRESSURE: 65 MMHG | OXYGEN SATURATION: 96 % | RESPIRATION RATE: 16 BRPM

## 2024-09-09 DIAGNOSIS — K21.9 GASTROESOPHAGEAL REFLUX DISEASE, UNSPECIFIED WHETHER ESOPHAGITIS PRESENT: Primary | ICD-10-CM

## 2024-09-09 DIAGNOSIS — K21.9 GERD (GASTROESOPHAGEAL REFLUX DISEASE): ICD-10-CM

## 2024-09-09 LAB
GLUCOSE SERPL-MCNC: 123 MG/DL (ref 70–110)
POCT GLUCOSE: 123 MG/DL (ref 70–110)

## 2024-09-09 PROCEDURE — 63600175 PHARM REV CODE 636 W HCPCS: Performed by: NURSE ANESTHETIST, CERTIFIED REGISTERED

## 2024-09-09 PROCEDURE — 27201012 HC FORCEPS, HOT/COLD, DISP: Performed by: STUDENT IN AN ORGANIZED HEALTH CARE EDUCATION/TRAINING PROGRAM

## 2024-09-09 PROCEDURE — 37000008 HC ANESTHESIA 1ST 15 MINUTES: Performed by: STUDENT IN AN ORGANIZED HEALTH CARE EDUCATION/TRAINING PROGRAM

## 2024-09-09 PROCEDURE — 99900035 HC TECH TIME PER 15 MIN (STAT)

## 2024-09-09 PROCEDURE — 82962 GLUCOSE BLOOD TEST: CPT | Performed by: STUDENT IN AN ORGANIZED HEALTH CARE EDUCATION/TRAINING PROGRAM

## 2024-09-09 PROCEDURE — 94761 N-INVAS EAR/PLS OXIMETRY MLT: CPT

## 2024-09-09 PROCEDURE — 88342 IMHCHEM/IMCYTCHM 1ST ANTB: CPT | Mod: 26,,, | Performed by: PATHOLOGY

## 2024-09-09 PROCEDURE — 43239 EGD BIOPSY SINGLE/MULTIPLE: CPT | Mod: ,,, | Performed by: STUDENT IN AN ORGANIZED HEALTH CARE EDUCATION/TRAINING PROGRAM

## 2024-09-09 PROCEDURE — 43239 EGD BIOPSY SINGLE/MULTIPLE: CPT | Performed by: STUDENT IN AN ORGANIZED HEALTH CARE EDUCATION/TRAINING PROGRAM

## 2024-09-09 PROCEDURE — 88305 TISSUE EXAM BY PATHOLOGIST: CPT | Mod: 59 | Performed by: PATHOLOGY

## 2024-09-09 PROCEDURE — 88342 IMHCHEM/IMCYTCHM 1ST ANTB: CPT | Performed by: PATHOLOGY

## 2024-09-09 PROCEDURE — 25000003 PHARM REV CODE 250: Performed by: NURSE ANESTHETIST, CERTIFIED REGISTERED

## 2024-09-09 PROCEDURE — 88305 TISSUE EXAM BY PATHOLOGIST: CPT | Mod: 26,,, | Performed by: PATHOLOGY

## 2024-09-09 PROCEDURE — D9220A PRA ANESTHESIA: Mod: ,,, | Performed by: NURSE ANESTHETIST, CERTIFIED REGISTERED

## 2024-09-09 PROCEDURE — 25000003 PHARM REV CODE 250: Performed by: STUDENT IN AN ORGANIZED HEALTH CARE EDUCATION/TRAINING PROGRAM

## 2024-09-09 RX ORDER — DEXMEDETOMIDINE HYDROCHLORIDE 100 UG/ML
INJECTION, SOLUTION INTRAVENOUS
Status: DISCONTINUED | OUTPATIENT
Start: 2024-09-09 | End: 2024-09-09

## 2024-09-09 RX ORDER — PROPOFOL 10 MG/ML
VIAL (ML) INTRAVENOUS CONTINUOUS PRN
Status: DISCONTINUED | OUTPATIENT
Start: 2024-09-09 | End: 2024-09-09

## 2024-09-09 RX ORDER — LIDOCAINE HYDROCHLORIDE 20 MG/ML
INJECTION INTRAVENOUS
Status: DISCONTINUED | OUTPATIENT
Start: 2024-09-09 | End: 2024-09-09

## 2024-09-09 RX ORDER — SODIUM CHLORIDE 9 MG/ML
INJECTION, SOLUTION INTRAVENOUS CONTINUOUS
Status: DISCONTINUED | OUTPATIENT
Start: 2024-09-09 | End: 2024-09-16 | Stop reason: HOSPADM

## 2024-09-09 RX ORDER — PROPOFOL 10 MG/ML
VIAL (ML) INTRAVENOUS
Status: DISCONTINUED | OUTPATIENT
Start: 2024-09-09 | End: 2024-09-09

## 2024-09-09 RX ORDER — OMEPRAZOLE 40 MG/1
40 CAPSULE, DELAYED RELEASE ORAL
Qty: 60 CAPSULE | Refills: 1 | Status: SHIPPED | OUTPATIENT
Start: 2024-09-09 | End: 2024-11-08

## 2024-09-09 RX ADMIN — PROPOFOL 100 MG: 10 INJECTION, EMULSION INTRAVENOUS at 12:09

## 2024-09-09 RX ADMIN — SODIUM CHLORIDE: 0.9 INJECTION, SOLUTION INTRAVENOUS at 10:09

## 2024-09-09 RX ADMIN — GLYCOPYRROLATE 0.1 MG: 0.2 INJECTION INTRAMUSCULAR; INTRAVENOUS at 12:09

## 2024-09-09 RX ADMIN — LIDOCAINE HYDROCHLORIDE 50 MG: 20 INJECTION INTRAVENOUS at 12:09

## 2024-09-09 RX ADMIN — PROPOFOL 150 MCG/KG/MIN: 10 INJECTION, EMULSION INTRAVENOUS at 12:09

## 2024-09-09 RX ADMIN — DEXMEDETOMIDINE 10 MCG: 200 INJECTION, SOLUTION INTRAVENOUS at 12:09

## 2024-09-09 NOTE — ANESTHESIA POSTPROCEDURE EVALUATION
Anesthesia Post Evaluation    Patient: Vijaya Montenegro    Procedure(s) Performed: Procedure(s) (LRB):  EGD (ESOPHAGOGASTRODUODENOSCOPY) (N/A)    Final Anesthesia Type: general      Patient location during evaluation: PACU  Patient participation: Yes- Able to Participate  Level of consciousness: awake and alert  Post-procedure vital signs: reviewed and stable  Pain management: adequate  Airway patency: patent    PONV status at discharge: No PONV  Anesthetic complications: no      Cardiovascular status: stable  Respiratory status: spontaneous ventilation  Hydration status: euvolemic  Follow-up not needed.          Vitals Value Taken Time   /65 09/09/24 1259   Temp 36.7 °C (98 °F) 09/09/24 1259   Pulse 66 09/09/24 1259   Resp 16 09/09/24 1259   SpO2 96 % 09/09/24 1259         Event Time   Out of Recovery 12:52:00         Pain/Anson Score: Anson Score: 10 (9/9/2024 12:53 PM)

## 2024-09-09 NOTE — PLAN OF CARE
Pt arrived  with daughter for egd. Pt  has no complaints. She  is aaox4. Her vitals are stable. Perioperative  plan of care has  been reviewed. IV established.  Pt  will be discharged home safely with  her  family

## 2024-09-09 NOTE — TRANSFER OF CARE
"Anesthesia Transfer of Care Note    Patient: Vijaya Montenegro    Procedure(s) Performed: Procedure(s) (LRB):  EGD (ESOPHAGOGASTRODUODENOSCOPY) (N/A)    Patient location: PACU    Anesthesia Type: general    Transport from OR: Transported from OR on 6-10 L/min O2 by face mask with adequate spontaneous ventilation    Post pain: adequate analgesia    Post assessment: no apparent anesthetic complications    Post vital signs: stable    Level of consciousness: sedated    Nausea/Vomiting: no nausea/vomiting    Complications: none    Transfer of care protocol was followed      Last vitals: Visit Vitals  BP (!) 161/77 (BP Location: Left arm, Patient Position: Lying)   Pulse 64   Temp 36.7 °C (98.1 °F) (Temporal)   Resp 16   Ht 5' 1" (1.549 m)   Wt 66.2 kg (146 lb)   LMP  (LMP Unknown)   SpO2 98%   Breastfeeding No   BMI 27.59 kg/m²     "

## 2024-09-09 NOTE — PROVATION PATIENT INSTRUCTIONS
Discharge Summary/Instructions after an Endoscopic Procedure  Patient Name: Vijaya Montenegro  Patient MRN: [id number]  Patient YOB: 1965  Monday, September 9, 2024  Marcela Orzoco MD  Dear patient,  As a result of recent federal legislation (The Federal Cures Act), you may   receive lab or pathology results from your procedure in your [facility]   account before your physician is able to contact you. Your physician or   their representative will relay the results to you with their   recommendations at their soonest availability.  Thank you,  RESTRICTIONS:  During your procedure today, you received medications for sedation.  These   medications may affect your judgment, balance and coordination.  Therefore,   for 24 hours, you have the following restrictions:   - DO NOT drive a car, operate machinery, make legal/financial decisions,   sign important papers or drink alcohol.    ACTIVITY:  Today: no heavy lifting, straining or running due to procedural   sedation/anesthesia.  The following day: return to full activity including work.  DIET:  Eat and drink normally unless instructed otherwise.     TREATMENT FOR COMMON SIDE EFFECTS:  - Mild abdominal pain, nausea, belching, bloating or excessive gas:  rest,   eat lightly and use a heating pad.  - Sore Throat: treat with throat lozenges and/or gargle with warm salt   water.  - Because air was used during the procedure, expelling large amounts of air   from your rectum or belching is normal.  - If a bowel prep was taken, you may not have a bowel movement for 1-3 days.    This is normal.  SYMPTOMS TO WATCH FOR AND REPORT TO YOUR PHYSICIAN:  1. Abdominal pain or bloating, other than gas cramps.  2. Chest pain.  3. Back pain.  4. Signs of infection such as: chills or fever occurring within 24 hours   after the procedure.  5. Rectal bleeding, which would show as bright red, maroon, or black stools.   (A tablespoon of blood from the rectum is not serious, especially if    hemorrhoids are present.)  6. Vomiting.  7. Weakness or dizziness.  GO DIRECTLY TO THE NEAREST EMERGENCY ROOM IF YOU HAVE ANY OF THE FOLLOWING:      Difficulty breathing              Chills and/or fever over 101 F   Persistent vomiting and/or vomiting blood   Severe abdominal pain   Severe chest pain   Black, tarry stools   Bleeding- more than one tablespoon   Any other symptom or condition that you feel may need urgent attention  Your doctor recommends these additional instructions:  If any biopsies were taken, your doctors clinic will contact you in 1 to 2   weeks with any results.  - Discharge patient to home (ambulatory).   - Resume regular diet.   - Continue present medications. Increase omprazole to 40mg twice daily for 2   months  - Await pathology results.   - Repeat upper endoscopy in 2 months to check healing.  For questions, problems or results please call your physician - Marcela Orozco MD at Work:  [phone].  [facility], EMERGENCY ROOM PHONE NUMBER: [phone]  IF A COMPLICATION OR EMERGENCY SITUATION ARISES AND YOU ARE UNABLE TO REACH   YOUR PHYSICIAN - GO DIRECTLY TO THE EMERGENCY ROOM.  Marcela Orozco MD  9/9/2024 12:35:17 PM  This report has been verified and signed electronically.  Dear patient,  As a result of recent federal legislation (The Federal Cures Act), you may   receive lab or pathology results from your procedure in your [facility]   account before your physician is able to contact you. Your physician or   their representative will relay the results to you with their   recommendations at their soonest availability.  Thank you,  PROVATION

## 2024-09-12 ENCOUNTER — TELEPHONE (OUTPATIENT)
Dept: ENDOSCOPY | Facility: HOSPITAL | Age: 59
End: 2024-09-12
Payer: COMMERCIAL

## 2024-09-12 VITALS — BODY MASS INDEX: 27.56 KG/M2 | HEIGHT: 61 IN | WEIGHT: 146 LBS

## 2024-09-12 DIAGNOSIS — K21.9 GASTROESOPHAGEAL REFLUX DISEASE WITHOUT ESOPHAGITIS: Primary | ICD-10-CM

## 2024-09-12 NOTE — TELEPHONE ENCOUNTER
"----- Message from Haven Ho sent at 2024 12:16 PM CDT -----    ----- Message -----  From: Camryn Vazquez RN  Sent: 2024   8:58 AM CDT  To: [facility] Endoscopist [facility] Patients      ----- Message -----  From: Marcela Orozco MD  Sent: 2024  12:34 PM CDT  To: Camryn Vazquez RN    Procedure: EGD    Diagnosis: esophagitis     Procedure Timin months check healing     #If within 4 weeks selected, please georges as high priority#    #If greater than 12 weeks, please select "5-12 weeks" and delay sending until 3 months prior to requested date#     Location: Any Site    Additional Scheduling Information: No scheduling concerns    Prep Specifications:N/A    Is the patient taking a GLP-1 Agonist:no    Have you attached a patient to this message: yes  "

## 2024-09-13 LAB
COMMENT: NORMAL
FINAL PATHOLOGIC DIAGNOSIS: NORMAL
GROSS: NORMAL
Lab: NORMAL

## 2024-09-16 ENCOUNTER — PATIENT MESSAGE (OUTPATIENT)
Dept: GASTROENTEROLOGY | Facility: CLINIC | Age: 59
End: 2024-09-16
Payer: COMMERCIAL

## 2024-09-19 NOTE — TELEPHONE ENCOUNTER
Spoke to patient to schedule procedure(s) Upper Endoscopy (EGD)       Physician to perform procedure(s) Dr. SHEA Sharma  Date of Procedure (s) 11/01/2024  Arrival Time 7:00 Am   Time of Procedure(s) 8:00 Am    Location of Procedure(s) [facility] 2nd Floor  Type of Rx Prep sent to patient: N/A  Instructions provided to patient via [facility]    Patient was informed on the following information and verbalized understanding. Screening questionnaire reviewed with patient and complete. If procedure requires anesthesia, a responsible adult needs to be present to accompany the patient home, patient cannot drive after receiving anesthesia. Appointment details are tentative, especially check-in time. Patient will receive a prep-op call 7 days prior to confirm check-in time for procedure. If applicable the patient should contact their pharmacy to verify Rx for procedure prep is ready for pick-up. Patient was advised to call the scheduling department at [phone] if pharmacy states no Rx is available. Patient was advised to call the endoscopy scheduling department if any questions or concerns arise.      [facility] Scheduling Department

## 2024-10-14 ENCOUNTER — OFFICE VISIT (OUTPATIENT)
Dept: OPTOMETRY | Facility: CLINIC | Age: 59
End: 2024-10-14
Payer: COMMERCIAL

## 2024-10-14 DIAGNOSIS — Z98.890 HISTORY OF LASER REFRACTIVE SURGERY: ICD-10-CM

## 2024-10-14 DIAGNOSIS — H52.203 HYPEROPIA WITH ASTIGMATISM AND PRESBYOPIA, BILATERAL: ICD-10-CM

## 2024-10-14 DIAGNOSIS — R80.9 TYPE 2 DIABETES MELLITUS WITH PROTEINURIA: ICD-10-CM

## 2024-10-14 DIAGNOSIS — H52.03 HYPEROPIA WITH ASTIGMATISM AND PRESBYOPIA, BILATERAL: ICD-10-CM

## 2024-10-14 DIAGNOSIS — E11.29 TYPE 2 DIABETES MELLITUS WITH PROTEINURIA: ICD-10-CM

## 2024-10-14 DIAGNOSIS — Z97.3 WEARS CONTACT LENSES: ICD-10-CM

## 2024-10-14 DIAGNOSIS — H52.4 HYPEROPIA WITH ASTIGMATISM AND PRESBYOPIA, BILATERAL: ICD-10-CM

## 2024-10-14 DIAGNOSIS — E11.9 TYPE 2 DIABETES MELLITUS WITHOUT RETINOPATHY: Primary | ICD-10-CM

## 2024-10-14 PROCEDURE — 1159F MED LIST DOCD IN RCRD: CPT | Mod: CPTII,S$GLB,,

## 2024-10-14 PROCEDURE — 99999 PR PBB SHADOW E&M-EST. PATIENT-LVL III: CPT | Mod: PBBFAC,,,

## 2024-10-14 PROCEDURE — 4010F ACE/ARB THERAPY RXD/TAKEN: CPT | Mod: CPTII,S$GLB,,

## 2024-10-14 PROCEDURE — 3044F HG A1C LEVEL LT 7.0%: CPT | Mod: CPTII,S$GLB,,

## 2024-10-14 PROCEDURE — 92004 COMPRE OPH EXAM NEW PT 1/>: CPT | Mod: S$GLB,,,

## 2024-10-14 PROCEDURE — 3061F NEG MICROALBUMINURIA REV: CPT | Mod: CPTII,S$GLB,,

## 2024-10-14 PROCEDURE — 3066F NEPHROPATHY DOC TX: CPT | Mod: CPTII,S$GLB,,

## 2024-10-14 NOTE — PROGRESS NOTES
HPI    Pt here for dm exam-dle-12/23 Francisco Javier Richards    Denies any blurred va. Wearing contacts and glasses. Denies any flashes or   floaters. BSL controlled/fluctuates. Hx of Lasik OU.     Hemoglobin A1C       Date                     Value               Ref Range             Status                08/02/2024               6.6 (H)             4.0 - 5.6 %           Final                  04/15/2024               7.0 (H)             4.0 - 5.6 %           Final                  10/13/2023               6.5 (H)             4.0 - 5.6 %           Final            Last edited by Boris Mahoney, OD on 10/14/2024 10:11 AM.            Assessment /Plan     For exam results, see Encounter Report.    Type 2 diabetes mellitus without retinopathy    Type 2 diabetes mellitus with proteinuria  -     Ambulatory referral/consult to Ophthalmology  -     Ambulatory referral/consult to Ophthalmology    History of laser refractive surgery    Hyperopia with astigmatism and presbyopia, bilateral    Wears contact lenses      1-2. No diabetic retinopathy or macular edema evident on today's exam OD, OS. Ed pt on importance of maintaining strict BS control due to potential for visual fluctuations and/or vision loss. Monitor with yearly dilated exam.    3. History of LASIK OU. Stable. Monitor yearly for changes.    4-5. Pt to return for contact lens fitting. Pt on medical insurance for DM exam today. Will bill refraction / CL fitting at next visit. Pt previously in [employer] - likes comfort. Trials ordered. Pt to return upon arrival of trials for in-office fitting.     RTC: 1 year for comprehensive exam or sooner prn

## 2024-10-18 DIAGNOSIS — E78.5 HYPERLIPIDEMIA WITH TARGET LDL LESS THAN 100: ICD-10-CM

## 2024-10-18 RX ORDER — ATORVASTATIN CALCIUM 20 MG/1
20 TABLET, FILM COATED ORAL
Qty: 90 TABLET | Refills: 0 | OUTPATIENT
Start: 2024-10-18

## 2024-10-18 NOTE — TELEPHONE ENCOUNTER
No care due was identified.  [facility] Embedded Care Due Messages. Reference number: [id number].   10/18/2024 8:57:38 AM CDT

## 2024-10-18 NOTE — TELEPHONE ENCOUNTER
Refill Routing Note   Medication(s) are not appropriate for processing by [facility] for the following reason(s):        Drug-drug interaction    [facility] action(s):  Defer      Medication Therapy Plan: PRAVACHOL    Pharmacist review requested: Yes     Appointments  past 12m or future 3m with PCP    Date Provider   Last Visit   4/22/2024 Lanette Hoover MD   Next Visit   Visit date not found Lanette Hoover MD   ED visits in past 90 days: 0        Note composed:9:11 AM 10/18/2024

## 2024-10-18 NOTE — TELEPHONE ENCOUNTER
Refill Decision Note   Vijaya Montenegro  is requesting a refill authorization.  Brief Assessment and Rationale for Refill:  Quick Discontinue     Medication Therapy Plan: DUPLICATE      Comments:     Note composed:9:06 AM 10/18/2024

## 2024-10-18 NOTE — TELEPHONE ENCOUNTER
No care due was identified.  [facility] Embedded Care Due Messages. Reference number: [id number].   10/18/2024 9:00:16 AM CDT

## 2024-10-20 RX ORDER — ATORVASTATIN CALCIUM 20 MG/1
20 TABLET, FILM COATED ORAL DAILY
Qty: 90 TABLET | Refills: 1 | Status: SHIPPED | OUTPATIENT
Start: 2024-10-20

## 2024-10-20 NOTE — TELEPHONE ENCOUNTER
Refill Decision Note   Vijaya Montenegro  is requesting a refill authorization.  Brief Assessment and Rationale for Refill:  Approve     Medication Therapy Plan:  PRAVACHOL      Pharmacist review requested: Yes   Comments:     Note composed:6:05 AM 10/20/2024

## 2024-10-29 ENCOUNTER — PATIENT MESSAGE (OUTPATIENT)
Dept: OPTOMETRY | Facility: CLINIC | Age: 59
End: 2024-10-29
Payer: COMMERCIAL

## 2024-10-29 ENCOUNTER — HOSPITAL ENCOUNTER (OUTPATIENT)
Dept: RADIOLOGY | Facility: HOSPITAL | Age: 59
Discharge: HOME | End: 2024-10-29
Attending: INTERNAL MEDICINE
Payer: COMMERCIAL

## 2024-10-29 ENCOUNTER — OFFICE VISIT (OUTPATIENT)
Dept: FAMILY MEDICINE | Facility: CLINIC | Age: 59
End: 2024-10-29
Payer: COMMERCIAL

## 2024-10-29 VITALS
HEIGHT: 61 IN | SYSTOLIC BLOOD PRESSURE: 110 MMHG | DIASTOLIC BLOOD PRESSURE: 60 MMHG | BODY MASS INDEX: 27.6 KG/M2 | TEMPERATURE: 98 F | WEIGHT: 146.19 LBS | HEART RATE: 63 BPM | OXYGEN SATURATION: 96 %

## 2024-10-29 DIAGNOSIS — M10.9 GOUT, ARTHRITIS: ICD-10-CM

## 2024-10-29 DIAGNOSIS — R80.9 TYPE 2 DIABETES MELLITUS WITH PROTEINURIA: Primary | ICD-10-CM

## 2024-10-29 DIAGNOSIS — Z23 NEED FOR TDAP VACCINATION: ICD-10-CM

## 2024-10-29 DIAGNOSIS — I10 ESSENTIAL HYPERTENSION: ICD-10-CM

## 2024-10-29 DIAGNOSIS — E11.29 TYPE 2 DIABETES MELLITUS WITH PROTEINURIA: Primary | ICD-10-CM

## 2024-10-29 DIAGNOSIS — Z13.820 ENCOUNTER FOR SCREENING FOR OSTEOPOROSIS: ICD-10-CM

## 2024-10-29 PROCEDURE — 99999 PR PBB SHADOW E&M-EST. PATIENT-LVL III: CPT | Mod: PBBFAC,,,

## 2024-10-29 PROCEDURE — 90715 TDAP VACCINE 7 YRS/> IM: CPT | Mod: S$GLB,,,

## 2024-10-29 PROCEDURE — 3078F DIAST BP <80 MM HG: CPT | Mod: CPTII,S$GLB,,

## 2024-10-29 PROCEDURE — 3008F BODY MASS INDEX DOCD: CPT | Mod: CPTII,S$GLB,,

## 2024-10-29 PROCEDURE — 77080 DXA BONE DENSITY AXIAL: CPT | Mod: 26,,, | Performed by: RADIOLOGY

## 2024-10-29 PROCEDURE — 99214 OFFICE O/P EST MOD 30 MIN: CPT | Mod: 25,S$GLB,,

## 2024-10-29 PROCEDURE — 3066F NEPHROPATHY DOC TX: CPT | Mod: CPTII,S$GLB,,

## 2024-10-29 PROCEDURE — 77080 DXA BONE DENSITY AXIAL: CPT | Mod: TC,PO

## 2024-10-29 PROCEDURE — 3044F HG A1C LEVEL LT 7.0%: CPT | Mod: CPTII,S$GLB,,

## 2024-10-29 PROCEDURE — 4010F ACE/ARB THERAPY RXD/TAKEN: CPT | Mod: CPTII,S$GLB,,

## 2024-10-29 PROCEDURE — 1159F MED LIST DOCD IN RCRD: CPT | Mod: CPTII,S$GLB,,

## 2024-10-29 PROCEDURE — 77092 TBS I&R FX RSK QHP: CPT | Mod: ,,, | Performed by: RADIOLOGY

## 2024-10-29 PROCEDURE — 90471 IMMUNIZATION ADMIN: CPT | Mod: S$GLB,,,

## 2024-10-29 PROCEDURE — 3061F NEG MICROALBUMINURIA REV: CPT | Mod: CPTII,S$GLB,,

## 2024-10-29 PROCEDURE — 1160F RVW MEDS BY RX/DR IN RCRD: CPT | Mod: CPTII,S$GLB,,

## 2024-10-29 PROCEDURE — 3074F SYST BP LT 130 MM HG: CPT | Mod: CPTII,S$GLB,,

## 2024-10-29 RX ORDER — IRBESARTAN 150 MG/1
150 TABLET ORAL NIGHTLY
Qty: 90 TABLET | Refills: 1 | Status: SHIPPED | OUTPATIENT
Start: 2024-10-29

## 2024-10-29 RX ORDER — METFORMIN HYDROCHLORIDE 500 MG/1
TABLET, EXTENDED RELEASE ORAL
Qty: 270 TABLET | Refills: 1 | Status: SHIPPED | OUTPATIENT
Start: 2024-10-29

## 2024-10-29 RX ORDER — METOPROLOL TARTRATE 50 MG/1
TABLET ORAL
Qty: 180 TABLET | Refills: 1 | Status: SHIPPED | OUTPATIENT
Start: 2024-10-29

## 2024-10-29 RX ORDER — KETOROLAC TROMETHAMINE 10 MG/1
10 TABLET, FILM COATED ORAL EVERY 6 HOURS PRN
Qty: 20 TABLET | Refills: 0 | Status: SHIPPED | OUTPATIENT
Start: 2024-10-29

## 2024-11-04 ENCOUNTER — TELEPHONE (OUTPATIENT)
Dept: ENDOSCOPY | Facility: HOSPITAL | Age: 59
End: 2024-11-04
Payer: COMMERCIAL

## 2024-11-04 NOTE — TELEPHONE ENCOUNTER
Left voicemail and sent portal message for patient to call Endoscopy Scheduling to review instructions and confirm appointment for Upper endoscopy (EGD)  on 11/11/24.

## 2024-11-06 ENCOUNTER — ANESTHESIA EVENT (OUTPATIENT)
Dept: ENDOSCOPY | Facility: HOSPITAL | Age: 59
End: 2024-11-06
Payer: COMMERCIAL

## 2024-11-06 ENCOUNTER — TELEPHONE (OUTPATIENT)
Dept: GASTROENTEROLOGY | Facility: CLINIC | Age: 59
End: 2024-11-06
Payer: COMMERCIAL

## 2024-11-06 NOTE — ANESTHESIA PREPROCEDURE EVALUATION
11/06/2024  Vijaya Montenegro is a 59 y.o., female.  [facility]  Anesthesia Pre-Operative Evaluation       Patient Name: Vijaya Montenegro  YOB: 1965  MRN: [id number]  [facility]: [id number]      Code Status: No Order   Date of Procedure: 11/11/2024  Anesthesia: Choice Procedure: Procedure(s) (LRB):  EGD (ESOPHAGOGASTRODUODENOSCOPY) (N/A)  Pre-Operative Diagnosis: Gastroesophageal reflux disease without esophagitis [K21.9]  Proceduralist: Surgeons and Role:     * Marcela Orozco MD - Primary        SUBJECTIVE:   Vijaya Montenegro is a 59 y.o. female who  has a past medical history of Anxiety, Celiac disease (9/20/2021), GERD (gastroesophageal reflux disease), Gout, arthritis, H. pylori infection (2021), Hyperlipidemia LDL goal < 100, Hypertension, Obesity, and Type II or unspecified type diabetes mellitus without mention of complication, not stated as uncontrolled..     she has a current medication list which includes the following long-term medication(s): atorvastatin, irbesartan, metformin, metoprolol tartrate, omeprazole, and trazodone.     ALLERGIES:     Review of patient's allergies indicates:   Allergen Reactions    Codeine Other (See Comments)     LDA:          Lines/Drains/Airways       None                  Anesthesia Evaluation      Airway   Mallampati: II  TM distance: Normal  Neck ROM: Normal ROM  Dental    (+) Intact    Pulmonary    Cardiovascular   (+) hypertension    Rate: Normal    Neuro/Psych      GI/Hepatic/Renal    (+) GERD    Endo/Other    (+) diabetes mellitus, arthritis (Osteopenia)  Abdominal                     MEDICATIONS:     Antibiotics (From admission, onward)      None          VTE Risk Mitigation (From admission, onward)      None              No current facility-administered medications for this encounter.     Current Outpatient Medications   Medication  Sig Dispense Refill    amoxicillin (AMOXIL) 500 MG capsule       atorvastatin (LIPITOR) 20 MG tablet Take 1 tablet (20 mg total) by mouth once daily. 90 tablet 1    desog-e.estradioL/e.estradioL (KARIVA, 28,) 0.15-0.02 mgx21 /0.01 mg x 5 per tablet       irbesartan (AVAPRO) 150 MG tablet Take 1 tablet (150 mg total) by mouth every evening. 90 tablet 1    ketorolac (TORADOL) 10 mg tablet Take 1 tablet (10 mg total) by mouth every 6 (six) hours as needed (gout). Take with food. 20 tablet 0    lancets 31 gauge Misc 1 lancet by Misc.(Non-Drug; Combo Route) route 2 (two) times daily. Test  each 5    metFORMIN (GLUCOPHAGE-XR) 500 MG ER 24hr tablet 2 tablets by mouth with breakfast and 1 tablet with dinner 270 tablet 1    metoprolol tartrate (LOPRESSOR) 50 MG tablet Taking 1 tablet by mouth in AM and 2 tablets by mouth in the  tablet 1    omeprazole (PRILOSEC) 40 MG capsule Take 1 capsule (40 mg total) by mouth 2 (two) times daily before meals. 60 capsule 1    pravastatin (PRAVACHOL) 10 MG tablet 1 tablet Orally Once a day      traZODone (DESYREL) 50 MG tablet Take 1 tablet (50 mg total) by mouth every evening. 90 tablet 3          History:   There are no hospital problems to display for this patient.    Surgical History:    has a past surgical history that includes  section, low transverse; Refractive surgery (Bilateral); and Esophagogastroduodenoscopy (N/A, 2024).   Social History:    reports being sexually active and has had partner(s) who are male.  reports that she has never smoked. She has never used smokeless tobacco. She reports current alcohol use. She reports that she does not use drugs.     OBJECTIVE:     Vital Signs (Most Recent):    Vital Signs Range (Last 24H):          There is no height or weight on file to calculate BMI.   Wt Readings from Last 4 Encounters:   10/29/24 66.3 kg (146 lb 2.6 oz)   24 66.2 kg (146 lb)   24 66.2 kg (146 lb)   24 68.4 kg (150 lb 12.7  "oz)       Significant Labs:  Lab Results   Component Value Date    WBC 7.62 04/15/2024    HGB 13.5 04/15/2024    HCT 40.0 04/15/2024     04/15/2024     04/15/2024    K 4.6 04/15/2024     04/15/2024    CREATININE 0.9 04/15/2024    BUN 17 04/15/2024    CO2 25 04/15/2024     (H) 04/15/2024    CALCIUM 10.2 04/15/2024    ALKPHOS 94 04/15/2024    ALT 18 04/15/2024    AST 16 04/15/2024    ALBUMIN 3.9 04/15/2024    HGBA1C 6.6 (H) 08/02/2024     No LMP recorded (lmp unknown). Patient is postmenopausal.  No results found for this or any previous visit (from the past 72 hours).    EKG:   No results found for this or any previous visit.    TTE:  No results found for this or any previous visit.  No results found for: "EF"   No results found for this or any previous visit.  MARIBELL:  No results found for this or any previous visit.  Stress Test:  No results found for this or any previous visit.     LHC:  No results found for this or any previous visit.     PFT:  No results found for: "FEV1", "FVC", "RZG0NWJ", "TLC", "DLCO"     ASSESSMENT/PLAN:        Pre-op Assessment    I have reviewed the Patient Summary Reports.     I have reviewed the Nursing Notes. I have reviewed the NPO Status.   I have reviewed the Medications.     Review of Systems  Anesthesia Hx:  No problems with previous Anesthesia             Denies Family Hx of Anesthesia complications.    Denies Personal Hx of Anesthesia complications.                    Hematology/Oncology:  Hematology Normal   Oncology Normal                                   EENT/Dental:  EENT/Dental Normal           Cardiovascular:     Hypertension                                          Pulmonary:  Pulmonary Normal                       Renal/:  Renal/ Normal                 Hepatic/GI:     GERD   Celiac disease             Musculoskeletal:  Arthritis (Osteopenia)   Gout            Neurological:  Neurology Normal                                    "   Endocrine:  Diabetes         Obesity / BMI > 30  Dermatological:  Skin Normal    Psych:   anxiety                 Physical Exam  General: Well nourished, Cooperative, Alert and Oriented    Airway:  Mallampati: II   Mouth Opening: Normal  TM Distance: Normal  Tongue: Normal  Neck ROM: Normal ROM    Dental:  Intact    Chest/Lungs:  Clear to auscultation, Normal Respiratory Rate    Heart:  Rate: Normal  Rhythm: Regular Rhythm        Anesthesia Plan  Type of Anesthesia, risks & benefits discussed:    Anesthesia Type: Gen Natural Airway  Intra-op Monitoring Plan: Standard ASA Monitors  Post Op Pain Control Plan: multimodal analgesia  Induction:  IV  Informed Consent: Informed consent signed with the Patient and all parties understand the risks and agree with anesthesia plan.  All questions answered.   ASA Score: 2  Day of Surgery Review of History & Physical: H&P Update referred to the surgeon/provider.    Ready For Surgery From Anesthesia Perspective.     .

## 2024-11-06 NOTE — TELEPHONE ENCOUNTER
----- Message from Nay sent at 11/6/2024 11:14 AM CST -----  Regarding: Later time  Contact: [phone]  Calling to request a later time for upcoming appointment . Please contact patient as soon as possible.

## 2024-11-07 ENCOUNTER — TELEPHONE (OUTPATIENT)
Dept: ENDOSCOPY | Facility: HOSPITAL | Age: 59
End: 2024-11-07
Payer: COMMERCIAL

## 2024-11-07 NOTE — TELEPHONE ENCOUNTER
Patient rescheduled, original referral for procedure from [facility]    Spoke to patient to reschedule Upper Endoscopy (EGD)       Physician to perform procedure(s) Dr. SUKUMAR Orozco  Date of Procedure (s) 11/20/24  Arrival Time 12:45 PM  Time of Procedure(s) 1:45 PM   Location of Procedure(s) [place] 2nd Floor  Instructions provided to patient via [facility]  Patient denies use of blood thinners, GLP-1 medications, and weight loss medications.  The following information was discussed with patient, and patient verbalized understanding:  Screening questionnaire reviewed with patient and complete. If procedure requires anesthesia, a responsible adult needs to be present to accompany the patient home. Appointment details are tentative, especially check-in time. Patient will receive a pre-op call 7 days prior to appointment to confirm check-in time for procedure. If applicable the patient should contact their pharmacy to verify Rx for procedure prep is ready for pick-up. Patient was instructed to call the scheduling department at [phone] if pharmacy states no Rx is available. Patient was also advised to call the endoscopy scheduling department if any questions or concerns arise.       Endoscopy Scheduling Department

## 2024-11-11 ENCOUNTER — ANESTHESIA (OUTPATIENT)
Dept: ENDOSCOPY | Facility: HOSPITAL | Age: 59
End: 2024-11-11
Payer: COMMERCIAL

## 2024-11-18 DIAGNOSIS — K21.9 GASTROESOPHAGEAL REFLUX DISEASE, UNSPECIFIED WHETHER ESOPHAGITIS PRESENT: Primary | ICD-10-CM

## 2024-11-18 RX ORDER — OMEPRAZOLE 40 MG/1
40 CAPSULE, DELAYED RELEASE ORAL EVERY MORNING
Qty: 30 CAPSULE | Refills: 11 | Status: SHIPPED | OUTPATIENT
Start: 2024-11-18 | End: 2025-11-13

## 2024-11-18 NOTE — H&P
Short Stay Endoscopy History and Physical    PCP - Lanette Hoover MD  Referring Physician - Marcela Orozco MD  [address] [place] [address]    Procedure - EGD  ASA - per anesthesia  Mallampati - per anesthesia  History of Anesthesia problems - no  Family history Anesthesia problems -  no   Plan of anesthesia - General    HPI  59 y.o. female  Reason for procedure:  Gastroesophageal reflux disease without esophagitis [K21.9]      ROS:  Constitutional: No fevers, chills, No weight loss  CV: No chest pain  Pulm: No cough, No shortness of breath  GI: see HPI    Medical History:  has a past medical history of Anxiety, Celiac disease (2021), GERD (gastroesophageal reflux disease), Gout, arthritis, H. pylori infection (), Hyperlipidemia LDL goal < 100, Hypertension, Obesity, and Type II or unspecified type diabetes mellitus without mention of complication, not stated as uncontrolled.    Surgical History:  has a past surgical history that includes  section, low transverse; Refractive surgery (Bilateral); and Esophagogastroduodenoscopy (N/A, 2024).    Family History: family history includes Bladder Cancer in her father; Blindness in her mother; Breast cancer in her maternal cousin; COPD in her mother; Cancer in her father; Cataracts in her maternal grandmother; Diabetes in her paternal aunt, paternal aunt, paternal grandmother, and sister; Heart attack in her maternal grandmother; Hypertension in her father; No Known Problems in her maternal aunt, maternal grandfather, maternal uncle, paternal grandfather, and paternal uncle; Thyroid disease in her mother..    Social History:  reports that she has never smoked. She has never used smokeless tobacco. She reports current alcohol use. She reports that she does not use drugs.    Review of patient's allergies indicates:   Allergen Reactions    Codeine Other (See Comments)       Medications:   Medications Prior to Admission   Medication Sig  Dispense Refill Last Dose/Taking    amoxicillin (AMOXIL) 500 MG capsule        atorvastatin (LIPITOR) 20 MG tablet Take 1 tablet (20 mg total) by mouth once daily. 90 tablet 1     desog-e.estradioL/e.estradioL (KARIVA, 28,) 0.15-0.02 mgx21 /0.01 mg x 5 per tablet        irbesartan (AVAPRO) 150 MG tablet Take 1 tablet (150 mg total) by mouth every evening. 90 tablet 1     ketorolac (TORADOL) 10 mg tablet Take 1 tablet (10 mg total) by mouth every 6 (six) hours as needed (gout). Take with food. 20 tablet 0     lancets 31 gauge Misc 1 lancet by Misc.(Non-Drug; Combo Route) route 2 (two) times daily. Test  each 5     metFORMIN (GLUCOPHAGE-XR) 500 MG ER 24hr tablet 2 tablets by mouth with breakfast and 1 tablet with dinner 270 tablet 1     metoprolol tartrate (LOPRESSOR) 50 MG tablet Taking 1 tablet by mouth in AM and 2 tablets by mouth in the  tablet 1     omeprazole (PRILOSEC) 40 MG capsule Take 1 capsule (40 mg total) by mouth every morning. 30 capsule 11     pravastatin (PRAVACHOL) 10 MG tablet 1 tablet Orally Once a day       traZODone (DESYREL) 50 MG tablet Take 1 tablet (50 mg total) by mouth every evening. 90 tablet 3        Physical Exam:    Vital Signs: There were no vitals filed for this visit.    General Appearance: Well appearing in no acute distress  Abdomen: Soft, non tender, non distended with normal bowel sounds, no masses      Labs:  Lab Results   Component Value Date    WBC 7.62 04/15/2024    HGB 13.5 04/15/2024    HCT 40.0 04/15/2024     04/15/2024    CHOL 152 04/15/2024    TRIG 126 04/15/2024    HDL 48 04/15/2024    ALT 18 04/15/2024    AST 16 04/15/2024     04/15/2024    K 4.6 04/15/2024     04/15/2024    CREATININE 0.9 04/15/2024    BUN 17 04/15/2024    CO2 25 04/15/2024    TSH 2.901 04/07/2021    HGBA1C 6.6 (H) 08/02/2024       I have explained the risks and benefits of this endoscopic procedure to the patient including but not limited to bleeding, inflammation,  infection, perforation, and death.    Assessment/Plan:     Esophagitis     - Proceed with EGD     Marcela Orozco MD  Gastroenterology   [facility]

## 2024-11-20 ENCOUNTER — HOSPITAL ENCOUNTER (OUTPATIENT)
Facility: HOSPITAL | Age: 59
Discharge: HOME | End: 2024-11-20
Attending: STUDENT IN AN ORGANIZED HEALTH CARE EDUCATION/TRAINING PROGRAM | Admitting: STUDENT IN AN ORGANIZED HEALTH CARE EDUCATION/TRAINING PROGRAM
Payer: COMMERCIAL

## 2024-11-20 VITALS
HEIGHT: 61 IN | RESPIRATION RATE: 16 BRPM | SYSTOLIC BLOOD PRESSURE: 162 MMHG | HEART RATE: 65 BPM | TEMPERATURE: 98 F | BODY MASS INDEX: 27.94 KG/M2 | DIASTOLIC BLOOD PRESSURE: 75 MMHG | OXYGEN SATURATION: 97 % | WEIGHT: 148 LBS

## 2024-11-20 DIAGNOSIS — K20.90 ESOPHAGITIS: Primary | ICD-10-CM

## 2024-11-20 LAB — POCT GLUCOSE: 114 MG/DL (ref 70–110)

## 2024-11-20 PROCEDURE — 25000003 PHARM REV CODE 250: Performed by: NURSE ANESTHETIST, CERTIFIED REGISTERED

## 2024-11-20 PROCEDURE — 43239 EGD BIOPSY SINGLE/MULTIPLE: CPT | Performed by: STUDENT IN AN ORGANIZED HEALTH CARE EDUCATION/TRAINING PROGRAM

## 2024-11-20 PROCEDURE — 43239 EGD BIOPSY SINGLE/MULTIPLE: CPT | Mod: ,,, | Performed by: STUDENT IN AN ORGANIZED HEALTH CARE EDUCATION/TRAINING PROGRAM

## 2024-11-20 PROCEDURE — 88305 TISSUE EXAM BY PATHOLOGIST: CPT | Mod: 59 | Performed by: PATHOLOGY

## 2024-11-20 PROCEDURE — 63600175 PHARM REV CODE 636 W HCPCS: Performed by: NURSE ANESTHETIST, CERTIFIED REGISTERED

## 2024-11-20 PROCEDURE — 27201012 HC FORCEPS, HOT/COLD, DISP: Performed by: STUDENT IN AN ORGANIZED HEALTH CARE EDUCATION/TRAINING PROGRAM

## 2024-11-20 PROCEDURE — 37000009 HC ANESTHESIA EA ADD 15 MINS: Performed by: STUDENT IN AN ORGANIZED HEALTH CARE EDUCATION/TRAINING PROGRAM

## 2024-11-20 PROCEDURE — A4216 STERILE WATER/SALINE, 10 ML: HCPCS | Performed by: NURSE ANESTHETIST, CERTIFIED REGISTERED

## 2024-11-20 PROCEDURE — 82962 GLUCOSE BLOOD TEST: CPT | Performed by: STUDENT IN AN ORGANIZED HEALTH CARE EDUCATION/TRAINING PROGRAM

## 2024-11-20 PROCEDURE — 88342 IMHCHEM/IMCYTCHM 1ST ANTB: CPT | Performed by: PATHOLOGY

## 2024-11-20 PROCEDURE — 37000008 HC ANESTHESIA 1ST 15 MINUTES: Performed by: STUDENT IN AN ORGANIZED HEALTH CARE EDUCATION/TRAINING PROGRAM

## 2024-11-20 RX ORDER — PROPOFOL 10 MG/ML
VIAL (ML) INTRAVENOUS
Status: DISCONTINUED | OUTPATIENT
Start: 2024-11-20 | End: 2024-11-20

## 2024-11-20 RX ORDER — SODIUM CHLORIDE 0.9 % (FLUSH) 0.9 %
SYRINGE (ML) INJECTION
Status: DISCONTINUED | OUTPATIENT
Start: 2024-11-20 | End: 2024-11-20

## 2024-11-20 RX ORDER — LIDOCAINE HYDROCHLORIDE 20 MG/ML
INJECTION INTRAVENOUS
Status: DISCONTINUED | OUTPATIENT
Start: 2024-11-20 | End: 2024-11-20

## 2024-11-20 RX ORDER — SODIUM CHLORIDE 9 MG/ML
INJECTION, SOLUTION INTRAVENOUS CONTINUOUS
Status: DISCONTINUED | OUTPATIENT
Start: 2024-11-20 | End: 2024-11-20 | Stop reason: HOSPADM

## 2024-11-20 RX ADMIN — LIDOCAINE HYDROCHLORIDE 100 MG: 20 INJECTION INTRAVENOUS at 12:11

## 2024-11-20 RX ADMIN — GLYCOPYRROLATE 0.2 MG: 0.2 INJECTION, SOLUTION INTRAMUSCULAR; INTRAVENOUS at 12:11

## 2024-11-20 RX ADMIN — PROPOFOL 80 MG: 10 INJECTION, EMULSION INTRAVENOUS at 12:11

## 2024-11-20 RX ADMIN — PROPOFOL 150 MCG/KG/MIN: 10 INJECTION, EMULSION INTRAVENOUS at 12:11

## 2024-11-20 RX ADMIN — SODIUM CHLORIDE 20 ML: 9 INJECTION, SOLUTION INTRAMUSCULAR; INTRAVENOUS; SUBCUTANEOUS at 12:11

## 2024-11-20 NOTE — PROVATION PATIENT INSTRUCTIONS
Discharge Summary/Instructions after an Endoscopic Procedure  Patient Name: Vijaya Montenegro  Patient MRN: [id number]  Patient YOB: 1965  Wednesday, November 20, 2024  Marcela Orozco MD  Dear patient,  As a result of recent federal legislation (The Federal Cures Act), you may   receive lab or pathology results from your procedure in your [facility]   account before your physician is able to contact you. Your physician or   their representative will relay the results to you with their   recommendations at their soonest availability.  Thank you,  RESTRICTIONS:  During your procedure today, you received medications for sedation.  These   medications may affect your judgment, balance and coordination.  Therefore,   for 24 hours, you have the following restrictions:   - DO NOT drive a car, operate machinery, make legal/financial decisions,   sign important papers or drink alcohol.    ACTIVITY:  Today: no heavy lifting, straining or running due to procedural   sedation/anesthesia.  The following day: return to full activity including work.  DIET:  Eat and drink normally unless instructed otherwise.     TREATMENT FOR COMMON SIDE EFFECTS:  - Mild abdominal pain, nausea, belching, bloating or excessive gas:  rest,   eat lightly and use a heating pad.  - Sore Throat: treat with throat lozenges and/or gargle with warm salt   water.  - Because air was used during the procedure, expelling large amounts of air   from your rectum or belching is normal.  - If a bowel prep was taken, you may not have a bowel movement for 1-3 days.    This is normal.  SYMPTOMS TO WATCH FOR AND REPORT TO YOUR PHYSICIAN:  1. Abdominal pain or bloating, other than gas cramps.  2. Chest pain.  3. Back pain.  4. Signs of infection such as: chills or fever occurring within 24 hours   after the procedure.  5. Rectal bleeding, which would show as bright red, maroon, or black stools.   (A tablespoon of blood from the rectum is not serious, especially  if   hemorrhoids are present.)  6. Vomiting.  7. Weakness or dizziness.  GO DIRECTLY TO THE NEAREST EMERGENCY ROOM IF YOU HAVE ANY OF THE FOLLOWING:      Difficulty breathing              Chills and/or fever over 101 F   Persistent vomiting and/or vomiting blood   Severe abdominal pain   Severe chest pain   Black, tarry stools   Bleeding- more than one tablespoon   Any other symptom or condition that you feel may need urgent attention  Your doctor recommends these additional instructions:  If any biopsies were taken, your doctors clinic will contact you in 1 to 2   weeks with any results.  - Discharge patient to home (ambulatory).   - Resume regular diet.   - Continue present medications.   - Await pathology results.   - Decrease to once daily omeprazole  For questions, problems or results please call your physician - Marcela Orozco MD at Work:  [phone].  [facility], EMERGENCY ROOM PHONE NUMBER: [phone]  IF A COMPLICATION OR EMERGENCY SITUATION ARISES AND YOU ARE UNABLE TO REACH   YOUR PHYSICIAN - GO DIRECTLY TO THE EMERGENCY ROOM.  Marcela Orozco MD  11/20/2024 1:01:16 PM  This report has been verified and signed electronically.  Dear patient,  As a result of recent federal legislation (The Federal Cures Act), you may   receive lab or pathology results from your procedure in your [facility]   account before your physician is able to contact you. Your physician or   their representative will relay the results to you with their   recommendations at their soonest availability.  Thank you,  PROVATION

## 2024-11-20 NOTE — ANESTHESIA POSTPROCEDURE EVALUATION
Anesthesia Post Evaluation    Patient: Vijaya Montenegro    Procedure(s) Performed: Procedure(s) (LRB):  EGD (ESOPHAGOGASTRODUODENOSCOPY) (N/A)    Final Anesthesia Type: general      Patient location during evaluation: GI PACU  Patient participation: Yes- Able to Participate  Level of consciousness: awake and alert, oriented and awake  Post-procedure vital signs: reviewed and stable  Pain management: adequate  Airway patency: patent    PONV status at discharge: No PONV  Anesthetic complications: no      Cardiovascular status: stable  Respiratory status: unassisted and room air  Hydration status: euvolemic  Follow-up not needed.              Vitals Value Taken Time   /70 11/20/24 1319   Temp 36.5 11/20/24 1441   Pulse 79 11/20/24 1320   Resp 12 11/20/24 1441   SpO2 96 % 11/20/24 1320   Vitals shown include unfiled device data.      Event Time   Out of Recovery 13:17:00         Pain/Anson Score: Anson Score: 10 (11/20/2024  1:18 PM)

## 2024-11-20 NOTE — TRANSFER OF CARE
"Anesthesia Transfer of Care Note    Patient: Vijaya Montenegro    Procedure(s) Performed: Procedure(s) (LRB):  EGD (ESOPHAGOGASTRODUODENOSCOPY) (N/A)    Patient location: PACU    Anesthesia Type: general    Transport from OR: Transported from OR on room air with adequate spontaneous ventilation    Post pain: adequate analgesia    Post assessment: no apparent anesthetic complications and tolerated procedure well    Post vital signs: stable    Level of consciousness: sedated    Nausea/Vomiting: no nausea/vomiting    Complications: none    Transfer of care protocol was followed      Last vitals: Visit Vitals  BP (!) 162/75 (BP Location: Left arm, Patient Position: Lying)   Pulse 65   Temp 36.4 °C (97.5 °F)   Resp 16   Ht 5' 1" (1.549 m)   Wt 67.1 kg (148 lb)   LMP  (LMP Unknown)   SpO2 97%   Breastfeeding No   BMI 27.96 kg/m²     "

## 2024-11-29 LAB
COMMENT: NORMAL
FINAL PATHOLOGIC DIAGNOSIS: NORMAL
GROSS: NORMAL
Lab: NORMAL

## 2025-02-07 ENCOUNTER — TELEPHONE (OUTPATIENT)
Dept: FAMILY MEDICINE | Facility: CLINIC | Age: 60
End: 2025-02-07
Payer: COMMERCIAL

## 2025-02-07 NOTE — LETTER
February 7, 2025    Vijaya Montenegro  [address] [address]  [place] [address]             [address] [address]  Phone: [phone]  Fax: [phone] To Whom It May Concern:    Vijaya Montenegro is a patient of mine at the [facility]. She is a diabetic. She will be taking a cruise soon, but because of her diabetes is not able to drink alcohol. Please allow her the option of not having to buy an alcohol card.   I appreciate your consideration in this matter.          If you have any questions or concerns, please don't hesitate to call.    Sincerely,         Lanette Hoover MD

## 2025-02-07 NOTE — TELEPHONE ENCOUNTER
Spoke with patient. Patient verbalized understanding. Patient has no other questions or concerns at this time.    Bi-Rhombic Flap Text: The defect edges were debeveled with a #15 scalpel blade.  Given the location of the defect and the proximity to free margins a bi-rhombic flap was deemed most appropriate.  Using a sterile surgical marker, an appropriate rhombic flap was drawn incorporating the defect. The area thus outlined was incised deep to adipose tissue with a #15 scalpel blade.  The skin margins were undermined to an appropriate distance in all directions utilizing iris scissors.

## 2025-02-07 NOTE — TELEPHONE ENCOUNTER
----- Message from Med Assistant Tonya sent at 2/7/2025  9:16 AM CST -----  Type: Patient Call Back    Who called:Self    What is the request in detail: pt. Is asking for a letter stating she's a diabetic and doesn't  drink for a cruise ..     Can the clinic reply by [facility]?NO    Would the patient rather a call back or a response via [facility]? Yes, call     Best call back number: [phone] (home)

## 2025-03-04 DIAGNOSIS — I10 ESSENTIAL HYPERTENSION: ICD-10-CM

## 2025-03-05 RX ORDER — METOPROLOL TARTRATE 50 MG/1
TABLET ORAL
Qty: 90 TABLET | Refills: 0 | Status: SHIPPED | OUTPATIENT
Start: 2025-03-05

## 2025-04-07 DIAGNOSIS — I10 ESSENTIAL HYPERTENSION: ICD-10-CM

## 2025-04-07 NOTE — TELEPHONE ENCOUNTER
Care Due:                  Date            Visit Type   Department     Provider  --------------------------------------------------------------------------------                                [facility] MED                              PRIMARY      / [facility]  Last Visit: 04-      CARE ([facility])   / PEDS         Matthew Hoover                              [facility]                              PRIMARY      / INTERNAL [facility]  Next Visit: 05-      CARE ([facility])   / PEDS         Matthew Hoover                                                            Last  Test          Frequency    Reason                     Performed    Due Date  --------------------------------------------------------------------------------    CMP.........  12 months..  atorvastatin.............  04-   04-    Lipid Panel.  12 months..  atorvastatin.............  04-   04-    [employer] Embedded Care Due Messages. Reference number: [id number].   4/07/2025 9:39:08 AM CDT

## 2025-04-08 RX ORDER — METOPROLOL TARTRATE 50 MG/1
TABLET ORAL
Qty: 180 TABLET | Refills: 0 | Status: SHIPPED | OUTPATIENT
Start: 2025-04-08

## 2025-04-08 NOTE — TELEPHONE ENCOUNTER
Refill Routing Note   Medication(s) are not appropriate for processing by [facility] for the following reason(s):        Required vitals abnormal    [facility] action(s):  Defer     Requires labs : Yes             Appointments  past 12m or future 3m with PCP    Date Provider   Last Visit   4/22/2024 Lanette Hoover MD   Next Visit   5/26/2025 Lanette Hoover MD   ED visits in past 90 days: 0        Note composed:8:48 PM 04/07/2025

## 2025-04-09 DIAGNOSIS — I10 ESSENTIAL HYPERTENSION: ICD-10-CM

## 2025-04-09 DIAGNOSIS — R80.9 TYPE 2 DIABETES MELLITUS WITH PROTEINURIA: ICD-10-CM

## 2025-04-09 DIAGNOSIS — E78.5 HYPERLIPIDEMIA WITH TARGET LDL LESS THAN 100: ICD-10-CM

## 2025-04-09 DIAGNOSIS — E11.29 TYPE 2 DIABETES MELLITUS WITH PROTEINURIA: ICD-10-CM

## 2025-04-09 RX ORDER — ATORVASTATIN CALCIUM 20 MG/1
20 TABLET, FILM COATED ORAL DAILY
Qty: 90 TABLET | Refills: 0 | Status: SHIPPED | OUTPATIENT
Start: 2025-04-09

## 2025-04-09 RX ORDER — IRBESARTAN 150 MG/1
150 TABLET ORAL NIGHTLY
Qty: 90 TABLET | Refills: 0 | Status: SHIPPED | OUTPATIENT
Start: 2025-04-09

## 2025-04-09 RX ORDER — METFORMIN HYDROCHLORIDE 500 MG/1
TABLET, EXTENDED RELEASE ORAL
Qty: 270 TABLET | Refills: 0 | Status: SHIPPED | OUTPATIENT
Start: 2025-04-09

## 2025-04-09 NOTE — TELEPHONE ENCOUNTER
----- Message from Tamie sent at 4/7/2025  9:39 AM CDT -----  .Type: Patient Call BackWho called: Self What is the request in detail: Stated she need all of her medications refilled. And sent to [facility] - [address] [address]: [phone] Fax: [phone] the clinic reply by [facility]? No Would the patient rather a call back or a response via [facility]? Call Back Best call back number: .[phone] (home) Additional Information:

## 2025-04-09 NOTE — TELEPHONE ENCOUNTER
No care due was identified.  [facility] Embedded Care Due Messages. Reference number: [id number].   4/09/2025 9:28:39 AM CDT

## 2025-04-28 DIAGNOSIS — I10 ESSENTIAL HYPERTENSION: ICD-10-CM

## 2025-04-28 NOTE — TELEPHONE ENCOUNTER
Care Due:                  Date            Visit Type   Department     Provider  --------------------------------------------------------------------------------                                EP -         [facility] MED                              PRIMARY      / INTERNAL MED [facility] [place]  Last Visit: 04-      CARE ([facility])   / PEDS         Matthew Hoover                               -         [facility] MED                              PRIMARY      / INTERNAL MED [facility] [place]  Next Visit: 05-      CARE ([facility])   / PEDS         Matthew Hoover                                                            Last  Test          Frequency    Reason                     Performed    Due Date  --------------------------------------------------------------------------------    HBA1C.......  6 months...  metFORMIN................  08- 01-    [facility] Embedded Care Due Messages. Reference number: [id number].   4/28/2025 8:41:25 AM CDDAVID

## 2025-04-29 RX ORDER — IRBESARTAN 150 MG/1
150 TABLET ORAL NIGHTLY
Qty: 90 TABLET | Refills: 0 | Status: SHIPPED | OUTPATIENT
Start: 2025-04-29

## 2025-04-29 NOTE — TELEPHONE ENCOUNTER
Refill Routing Note   Medication(s) are not appropriate for processing by [facility] for the following reason(s):        Required vitals abnormal  Required labs outdated    [facility] action(s):  Defer     Requires labs : Yes             Appointments  past 12m or future 3m with PCP    Date Provider   Last Visit   4/22/2024 Lanette Hoover MD   Next Visit   5/26/2025 Lanette Hoover MD   ED visits in past 90 days: 0        Note composed:10:06 AM 04/29/2025

## 2025-05-06 ENCOUNTER — TELEPHONE (OUTPATIENT)
Dept: FAMILY MEDICINE | Facility: CLINIC | Age: 60
End: 2025-05-06
Payer: COMMERCIAL

## 2025-05-06 DIAGNOSIS — E11.29 TYPE 2 DIABETES MELLITUS WITH PROTEINURIA: ICD-10-CM

## 2025-05-06 DIAGNOSIS — R80.9 TYPE 2 DIABETES MELLITUS WITH PROTEINURIA: ICD-10-CM

## 2025-05-06 DIAGNOSIS — E78.5 HYPERLIPIDEMIA WITH TARGET LDL LESS THAN 100: ICD-10-CM

## 2025-05-06 DIAGNOSIS — I10 ESSENTIAL HYPERTENSION: ICD-10-CM

## 2025-05-08 NOTE — TELEPHONE ENCOUNTER
Spoke with patient. Patient states that she is driving and will call back to schedule a lab appointment prior to appointment with pcp.

## 2025-05-19 DIAGNOSIS — I10 ESSENTIAL HYPERTENSION: ICD-10-CM

## 2025-05-19 RX ORDER — METOPROLOL TARTRATE 50 MG/1
TABLET ORAL
Qty: 180 TABLET | Refills: 0 | Status: SHIPPED | OUTPATIENT
Start: 2025-05-19

## 2025-05-19 NOTE — TELEPHONE ENCOUNTER
Care Due:                  Date            Visit Type   Department     Provider  --------------------------------------------------------------------------------                                EP -         [facility] MED                              PRIMARY      / INTERNAL MED [facility] [place]  Last Visit: 04-      CARE ([facility])   / PEDS         Matthew Hoover                               -         [facility] MED                              PRIMARY      / [facility]  Next Visit: 09-      CARE ([facility])   / PEDS         Darienkeler Sneha                                                            Last  Test          Frequency    Reason                     Performed    Due Date  --------------------------------------------------------------------------------    Office Visit  15 months..  atorvastatin, irbesartan,   04- 07-                             metFORMIN, traZODone.....    [employer] Embedded Care Due Messages. Reference number: [id number].   5/19/2025 9:32:46 AM CDT

## 2025-05-19 NOTE — TELEPHONE ENCOUNTER
Refill Routing Note   Medication(s) are not appropriate for processing by [facility] for the following reason(s):        Required vitals abnormal    [facility] action(s):  Defer        Medication Therapy Plan: 11/20/24 (!) 162/75; FOVS 9/8/25      Appointments  past 12m or future 3m with PCP    Date Provider   Last Visit   4/22/2024 Lanette Hoover MD   Next Visit   9/8/2025 Lanette Hoover MD   ED visits in past 90 days: 0        Note composed:6:15 PM 05/19/2025

## 2025-05-19 NOTE — TELEPHONE ENCOUNTER
May appt was rescheduled for September. Due for office visit now - please see if we can reschedule with myself or Purvi.

## 2025-05-28 ENCOUNTER — TELEPHONE (OUTPATIENT)
Dept: FAMILY MEDICINE | Facility: CLINIC | Age: 60
End: 2025-05-28
Payer: COMMERCIAL

## 2025-05-28 NOTE — TELEPHONE ENCOUNTER
----- Message from Addy sent at 5/28/2025 12:12 PM CDT -----  Regarding: self  Type: Patient Call BackWho called:selfWhat is the request in detail: Patient has an appt on June 2nd. (Which I cancelled) States her new insurance goes into affect on June 5th. Attempted to reschedule but first available isn't until September. Patient requesting an appt anytime after June 5th.  Please call patient to schedule an appt.Can the clinic reply by [facility]? NoWould the patient rather a call back or a response via [facility]? Call [facility] call back number:[phone] Information:Thank you.

## 2025-05-28 NOTE — TELEPHONE ENCOUNTER
Spoke with patient. Patient was advised the soonest appointment was September. Patient scheduled for 09/17/2025. Appointment placed on waitlist.

## 2025-07-15 ENCOUNTER — PATIENT MESSAGE (OUTPATIENT)
Dept: ADMINISTRATIVE | Facility: OTHER | Age: 60
End: 2025-07-15
Payer: COMMERCIAL

## 2025-08-03 DIAGNOSIS — I10 ESSENTIAL HYPERTENSION: ICD-10-CM

## 2025-08-03 NOTE — TELEPHONE ENCOUNTER
Care Due:                  Date            Visit Type   Department     Provider  --------------------------------------------------------------------------------                                [facility]                              PRIMARY      / INTERNAL MED [facility]  Last Visit: 04-      CARE ([facility])   / PEDS         Darienkeldarrel Hoover                              [facility]                              PRIMARY      [facility]  Next Visit: 09-      CARE ([facility])   / PEDS         Darienkeler Sneha                                                            Last  Test          Frequency    Reason                     Performed    Due Date  --------------------------------------------------------------------------------    CMP.........  12 months..  atorvastatin, irbesartan,   04-   04-                             metFORMIN................    HBA1C.......  6 months...  metFORMIN................  08- 01-    Lipid Panel.  12 months..  atorvastatin.............  04-   04-    [facility] Embedded Care Due Messages. Reference number: [id number].   8/03/2025 11:17:54 AM CDT

## 2025-08-04 RX ORDER — METOPROLOL TARTRATE 50 MG/1
TABLET ORAL
Qty: 180 TABLET | Refills: 0 | Status: SHIPPED | OUTPATIENT
Start: 2025-08-04

## 2025-08-04 NOTE — TELEPHONE ENCOUNTER
Refill Routing Note   Medication(s) are not appropriate for processing by [facility] for the following reason(s):        Patient not seen by provider within 15 months  Required vitals abnormal    [facility] action(s):  Defer   Requires labs : Yes             Appointments  past 12m or future 3m with PCP    Date Provider   Last Visit   4/22/2024 Lanette Hoover MD   Next Visit   9/17/2025 Lanette Hoover MD   ED visits in past 90 days: 0        Note composed:3:07 PM 08/04/2025

## 2025-08-19 DIAGNOSIS — E78.5 HYPERLIPIDEMIA WITH TARGET LDL LESS THAN 100: ICD-10-CM

## 2025-08-19 RX ORDER — ATORVASTATIN CALCIUM 20 MG/1
20 TABLET, FILM COATED ORAL DAILY
Qty: 90 TABLET | Refills: 0 | OUTPATIENT
Start: 2025-08-19

## 2025-08-19 RX ORDER — ATORVASTATIN CALCIUM 20 MG/1
20 TABLET, FILM COATED ORAL DAILY
Qty: 90 TABLET | Refills: 0 | Status: SHIPPED | OUTPATIENT
Start: 2025-08-19

## 2025-08-28 ENCOUNTER — TELEPHONE (OUTPATIENT)
Dept: FAMILY MEDICINE | Facility: CLINIC | Age: 60
End: 2025-08-28
Payer: COMMERCIAL

## 2025-08-28 DIAGNOSIS — E11.29 TYPE 2 DIABETES MELLITUS WITH PROTEINURIA: ICD-10-CM

## 2025-08-28 DIAGNOSIS — R80.9 TYPE 2 DIABETES MELLITUS WITH PROTEINURIA: Primary | ICD-10-CM

## 2025-08-28 DIAGNOSIS — E11.29 TYPE 2 DIABETES MELLITUS WITH PROTEINURIA: Primary | ICD-10-CM

## 2025-08-28 DIAGNOSIS — R80.9 TYPE 2 DIABETES MELLITUS WITH PROTEINURIA: ICD-10-CM

## 2025-08-28 RX ORDER — METFORMIN HYDROCHLORIDE 500 MG/1
TABLET, EXTENDED RELEASE ORAL
Qty: 270 TABLET | Refills: 0 | Status: SHIPPED | OUTPATIENT
Start: 2025-08-28